# Patient Record
Sex: MALE | Race: WHITE | NOT HISPANIC OR LATINO | Employment: FULL TIME | URBAN - METROPOLITAN AREA
[De-identification: names, ages, dates, MRNs, and addresses within clinical notes are randomized per-mention and may not be internally consistent; named-entity substitution may affect disease eponyms.]

---

## 2017-01-26 ENCOUNTER — OFFICE VISIT (OUTPATIENT)
Dept: INTERNAL MEDICINE | Facility: CLINIC | Age: 47
End: 2017-01-26
Payer: COMMERCIAL

## 2017-01-26 VITALS
RESPIRATION RATE: 16 BRPM | OXYGEN SATURATION: 95 % | TEMPERATURE: 97 F | BODY MASS INDEX: 30.96 KG/M2 | HEART RATE: 101 BPM | DIASTOLIC BLOOD PRESSURE: 80 MMHG | HEIGHT: 70 IN | SYSTOLIC BLOOD PRESSURE: 130 MMHG | WEIGHT: 216.25 LBS

## 2017-01-26 DIAGNOSIS — F98.8 ADD (ATTENTION DEFICIT DISORDER): Primary | ICD-10-CM

## 2017-01-26 DIAGNOSIS — I10 ESSENTIAL HYPERTENSION: ICD-10-CM

## 2017-01-26 DIAGNOSIS — F31.78 BIPOLAR DISORDER, IN FULL REMISSION, MOST RECENT EPISODE MIXED: ICD-10-CM

## 2017-01-26 DIAGNOSIS — F10.11 ALCOHOL ABUSE, IN REMISSION: ICD-10-CM

## 2017-01-26 PROCEDURE — 99999 PR PBB SHADOW E&M-EST. PATIENT-LVL III: CPT | Mod: PBBFAC,,, | Performed by: FAMILY MEDICINE

## 2017-01-26 PROCEDURE — 1159F MED LIST DOCD IN RCRD: CPT | Mod: S$GLB,,, | Performed by: FAMILY MEDICINE

## 2017-01-26 PROCEDURE — 99214 OFFICE O/P EST MOD 30 MIN: CPT | Mod: S$GLB,,, | Performed by: FAMILY MEDICINE

## 2017-01-26 PROCEDURE — 3079F DIAST BP 80-89 MM HG: CPT | Mod: S$GLB,,, | Performed by: FAMILY MEDICINE

## 2017-01-26 PROCEDURE — 3075F SYST BP GE 130 - 139MM HG: CPT | Mod: S$GLB,,, | Performed by: FAMILY MEDICINE

## 2017-01-26 RX ORDER — DEXTROAMPHETAMINE SACCHARATE, AMPHETAMINE ASPARTATE, DEXTROAMPHETAMINE SULFATE AND AMPHETAMINE SULFATE 7.5; 7.5; 7.5; 7.5 MG/1; MG/1; MG/1; MG/1
1 TABLET ORAL 2 TIMES DAILY
Qty: 60 TABLET | Refills: 0 | Status: SHIPPED | OUTPATIENT
Start: 2017-02-27 | End: 2017-04-21 | Stop reason: SDUPTHER

## 2017-01-26 RX ORDER — DEXTROAMPHETAMINE SACCHARATE, AMPHETAMINE ASPARTATE, DEXTROAMPHETAMINE SULFATE AND AMPHETAMINE SULFATE 7.5; 7.5; 7.5; 7.5 MG/1; MG/1; MG/1; MG/1
1 TABLET ORAL 2 TIMES DAILY
Qty: 60 TABLET | Refills: 0 | Status: SHIPPED | OUTPATIENT
Start: 2017-01-27 | End: 2017-04-21 | Stop reason: SDUPTHER

## 2017-01-26 RX ORDER — ZOLPIDEM TARTRATE 5 MG/1
5 TABLET ORAL NIGHTLY PRN
Qty: 21 TABLET | Refills: 2 | Status: SHIPPED | OUTPATIENT
Start: 2017-01-26 | End: 2017-04-21 | Stop reason: SDUPTHER

## 2017-01-26 RX ORDER — LAMOTRIGINE 150 MG/1
150 TABLET ORAL DAILY
Qty: 30 TABLET | Refills: 6 | Status: SHIPPED | OUTPATIENT
Start: 2017-01-26 | End: 2017-04-21 | Stop reason: SDUPTHER

## 2017-01-26 RX ORDER — DEXTROAMPHETAMINE SACCHARATE, AMPHETAMINE ASPARTATE, DEXTROAMPHETAMINE SULFATE AND AMPHETAMINE SULFATE 7.5; 7.5; 7.5; 7.5 MG/1; MG/1; MG/1; MG/1
1 TABLET ORAL 2 TIMES DAILY
Qty: 60 TABLET | Refills: 0 | Status: SHIPPED | OUTPATIENT
Start: 2017-03-27 | End: 2017-04-21 | Stop reason: SDUPTHER

## 2017-01-26 NOTE — PROGRESS NOTES
Chief Complaint:    Chief Complaint   Patient presents with    Medication Refill       History of Present Illness:    Is here for follow-up of chronic medical problems,  Patient has ADHD, hypertension, bipolar disorder.  He is doing well with his medical conditions no side effects to the medication.  He is not drinking alcohol anymore.    ROS:  Review of Systems   Constitutional: Negative for activity change, chills, fatigue, fever and unexpected weight change.   HENT: Negative for congestion, ear discharge, ear pain, hearing loss, postnasal drip and rhinorrhea.    Eyes: Negative for pain and visual disturbance.   Respiratory: Negative for cough, chest tightness and shortness of breath.    Cardiovascular: Negative for chest pain and palpitations.   Gastrointestinal: Negative for abdominal pain, diarrhea and vomiting.   Endocrine: Negative for heat intolerance.   Genitourinary: Negative for dysuria, flank pain, frequency and hematuria.   Musculoskeletal: Negative for back pain, gait problem and neck pain.   Skin: Negative for color change and rash.   Neurological: Negative for dizziness, tremors, seizures, numbness and headaches.   Psychiatric/Behavioral: Negative for agitation, hallucinations, self-injury, sleep disturbance and suicidal ideas. The patient is not nervous/anxious.        History reviewed. No pertinent past medical history.    Social History:  Social History     Social History    Marital status:      Spouse name: N/A    Number of children: N/A    Years of education: N/A     Social History Main Topics    Smoking status: Former Smoker    Smokeless tobacco: Never Used    Alcohol use No    Drug use: No    Sexual activity: Yes     Partners: Female     Birth control/ protection: None, Surgical      Comment: Vasectomy     Other Topics Concern    None     Social History Narrative       Family History:   family history includes Diabetes in his father; Heart disease in his paternal  "grandmother; Hypertension in his father and mother.    Health Maintenance   Topic Date Due    TETANUS VACCINE  05/23/1986    Influenza Vaccine  08/01/2016    Lipid Panel  03/28/2021       Physical Exam:    Vital Signs  Temp: 97 °F (36.1 °C)  Temp src: Tympanic  Pulse: 101  Resp: 16  SpO2: 95 %  BP: 130/80  BP Location: Right arm  BP Method: Manual  Patient Position: Sitting  Height and Weight  Height: 5' 10" (177.8 cm)  Weight: 98.1 kg (216 lb 4.3 oz)  BSA (Calculated - sq m): 2.2 sq meters  BMI (Calculated): 31.1  Weight in (lb) to have BMI = 25: 173.9]    Body mass index is 31.03 kg/(m^2).    Physical Exam   Constitutional: He is oriented to person, place, and time. He appears well-developed.   HENT:   Mouth/Throat: Oropharynx is clear and moist.   Eyes: Conjunctivae are normal. Pupils are equal, round, and reactive to light.   Neck: Normal range of motion. Neck supple.   Cardiovascular: Normal rate, regular rhythm and normal heart sounds.    No murmur heard.  Pulmonary/Chest: Effort normal and breath sounds normal. No respiratory distress. He has no wheezes. He has no rales. He exhibits no tenderness.   Abdominal: Soft. He exhibits no distension and no mass. There is no tenderness. There is no guarding.   Musculoskeletal: He exhibits no edema or tenderness.   Lymphadenopathy:     He has no cervical adenopathy.   Neurological: He is alert and oriented to person, place, and time. He has normal reflexes.   Skin: Skin is warm and dry.   Psychiatric: He has a normal mood and affect. His behavior is normal. Judgment and thought content normal.   Vitals reviewed.      Lab Results   Component Value Date    CHOL 111 (L) 03/28/2016    CHOL 165 06/11/2014    TRIG 99 03/28/2016    TRIG 101 06/11/2014    HDL 40 03/28/2016    HDL 37 (L) 06/11/2014    TOTALCHOLEST 2.8 03/28/2016    TOTALCHOLEST 4.5 06/11/2014    NONHDLCHOL 71 03/28/2016    NONHDLCHOL 128 06/11/2014       Lab Results   Component Value Date    HGBA1C 5.5 " 03/28/2016       Assessment:      ICD-10-CM ICD-9-CM   1. ADD (attention deficit disorder) F98.8 314.00   2. Bipolar disorder, in full remission, most recent episode mixed F31.78 296.66   3. Essential hypertension I10 401.9   4. Alcohol abuse, in remission F10.10 305.03         Plan:  We'll continue current medication.  Patient's Adderall is been refilled.  Follow-up 3 months.    No orders of the defined types were placed in this encounter.      Current Outpatient Prescriptions   Medication Sig Dispense Refill    amlodipine (NORVASC) 5 MG tablet Take 1 tablet (5 mg total) by mouth once daily. 30 tablet 6    atorvastatin (LIPITOR) 40 MG tablet Take 1 tablet by mouth once daily.  6    [START ON 3/27/2017] dextroamphetamine-amphetamine 30 mg Tab Take 1 tablet by mouth 2 (two) times daily. 60 tablet 0    [START ON 2/27/2017] dextroamphetamine-amphetamine 30 mg Tab Take 1 tablet by mouth 2 (two) times daily. 60 tablet 0    [START ON 1/27/2017] dextroamphetamine-amphetamine 30 mg Tab Take 1 tablet by mouth 2 (two) times daily. 60 tablet 0    lamotrigine (LAMICTAL) 150 MG Tab Take 1 tablet (150 mg total) by mouth once daily. 30 tablet 6    lisinopril 10 MG tablet Take 1 tablet (10 mg total) by mouth once daily. 30 tablet 6    NASONEX 50 mcg/actuation nasal spray 2 sprays by Nasal route daily as needed.  5    zolpidem (AMBIEN) 5 MG Tab Take 1 tablet (5 mg total) by mouth nightly as needed. 21 tablet 2     No current facility-administered medications for this visit.        Medications Discontinued During This Encounter   Medication Reason    dextroamphetamine-amphetamine (AMPHETAMINE SALT COMBO) 30 mg Tab     lamotrigine (LAMICTAL) 150 MG Tab Reorder    dextroamphetamine-amphetamine 30 mg Tab Reorder    dextroamphetamine-amphetamine 30 mg Tab Reorder    dextroamphetamine-amphetamine 30 mg Tab Reorder    zolpidem (AMBIEN) 5 MG Tab Reorder       No Follow-up on file.      Dr Oscar Rodríguez MD    Disclaimer: This  note is prepared using voice recognition system and as such is likely to have errors and is not proof read.

## 2017-03-28 ENCOUNTER — TELEPHONE (OUTPATIENT)
Dept: INTERNAL MEDICINE | Facility: CLINIC | Age: 47
End: 2017-03-28

## 2017-03-28 NOTE — TELEPHONE ENCOUNTER
----- Message from Rosemarie Tenorio sent at 3/28/2017  8:27 AM CDT -----  Contact: Patient  Patient states that the pharmacy needs prior authorization on his Cecilia. Please call patient back if needed at [phone]. Thank you

## 2017-03-28 NOTE — TELEPHONE ENCOUNTER
----- Message from Lucie Franco sent at 3/28/2017  9:42 AM CDT -----  Contact: Patient  Patient returned call. He can be contacted at [phone].    Thanks,  Lucie

## 2017-03-28 NOTE — TELEPHONE ENCOUNTER
Attempted to call, no answer, message left to call back , need to know what meds he has tried in the past

## 2017-04-21 ENCOUNTER — OFFICE VISIT (OUTPATIENT)
Dept: INTERNAL MEDICINE | Facility: CLINIC | Age: 47
End: 2017-04-21
Payer: COMMERCIAL

## 2017-04-21 VITALS
WEIGHT: 212.5 LBS | HEART RATE: 92 BPM | TEMPERATURE: 97 F | SYSTOLIC BLOOD PRESSURE: 110 MMHG | DIASTOLIC BLOOD PRESSURE: 78 MMHG | BODY MASS INDEX: 30.42 KG/M2 | OXYGEN SATURATION: 96 % | HEIGHT: 70 IN

## 2017-04-21 DIAGNOSIS — I10 ESSENTIAL HYPERTENSION: Primary | ICD-10-CM

## 2017-04-21 DIAGNOSIS — F31.78 BIPOLAR DISORDER, IN FULL REMISSION, MOST RECENT EPISODE MIXED: ICD-10-CM

## 2017-04-21 DIAGNOSIS — F98.8 ADD (ATTENTION DEFICIT DISORDER): ICD-10-CM

## 2017-04-21 PROCEDURE — 99214 OFFICE O/P EST MOD 30 MIN: CPT | Mod: S$GLB,,, | Performed by: FAMILY MEDICINE

## 2017-04-21 PROCEDURE — 3074F SYST BP LT 130 MM HG: CPT | Mod: S$GLB,,, | Performed by: FAMILY MEDICINE

## 2017-04-21 PROCEDURE — 1160F RVW MEDS BY RX/DR IN RCRD: CPT | Mod: S$GLB,,, | Performed by: FAMILY MEDICINE

## 2017-04-21 PROCEDURE — 3078F DIAST BP <80 MM HG: CPT | Mod: S$GLB,,, | Performed by: FAMILY MEDICINE

## 2017-04-21 PROCEDURE — 99999 PR PBB SHADOW E&M-EST. PATIENT-LVL III: CPT | Mod: PBBFAC,,, | Performed by: FAMILY MEDICINE

## 2017-04-21 RX ORDER — ZOLPIDEM TARTRATE 5 MG/1
5 TABLET ORAL NIGHTLY PRN
Qty: 21 TABLET | Refills: 2 | Status: SHIPPED | OUTPATIENT
Start: 2017-04-27 | End: 2017-07-17 | Stop reason: SDUPTHER

## 2017-04-21 RX ORDER — DEXTROAMPHETAMINE SACCHARATE, AMPHETAMINE ASPARTATE, DEXTROAMPHETAMINE SULFATE AND AMPHETAMINE SULFATE 7.5; 7.5; 7.5; 7.5 MG/1; MG/1; MG/1; MG/1
1 TABLET ORAL 2 TIMES DAILY
Qty: 60 TABLET | Refills: 0 | Status: SHIPPED | OUTPATIENT
Start: 2017-06-27 | End: 2017-07-17 | Stop reason: SDUPTHER

## 2017-04-21 RX ORDER — AMLODIPINE BESYLATE 5 MG/1
5 TABLET ORAL DAILY
Qty: 30 TABLET | Refills: 6 | Status: SHIPPED | OUTPATIENT
Start: 2017-05-27 | End: 2017-07-17 | Stop reason: SDUPTHER

## 2017-04-21 RX ORDER — LISINOPRIL 10 MG/1
10 TABLET ORAL DAILY
Qty: 30 TABLET | Refills: 6 | Status: SHIPPED | OUTPATIENT
Start: 2017-04-21 | End: 2017-07-17 | Stop reason: SDUPTHER

## 2017-04-21 RX ORDER — LAMOTRIGINE 150 MG/1
150 TABLET ORAL DAILY
Qty: 30 TABLET | Refills: 6 | Status: SHIPPED | OUTPATIENT
Start: 2017-04-21 | End: 2017-07-17 | Stop reason: SDUPTHER

## 2017-04-21 RX ORDER — DEXTROAMPHETAMINE SACCHARATE, AMPHETAMINE ASPARTATE, DEXTROAMPHETAMINE SULFATE AND AMPHETAMINE SULFATE 7.5; 7.5; 7.5; 7.5 MG/1; MG/1; MG/1; MG/1
1 TABLET ORAL 2 TIMES DAILY
Qty: 60 TABLET | Refills: 0 | Status: SHIPPED | OUTPATIENT
Start: 2017-04-27 | End: 2017-07-17 | Stop reason: SDUPTHER

## 2017-04-21 RX ORDER — DEXTROAMPHETAMINE SACCHARATE, AMPHETAMINE ASPARTATE, DEXTROAMPHETAMINE SULFATE AND AMPHETAMINE SULFATE 7.5; 7.5; 7.5; 7.5 MG/1; MG/1; MG/1; MG/1
1 TABLET ORAL 2 TIMES DAILY
Qty: 60 TABLET | Refills: 0 | Status: SHIPPED | OUTPATIENT
Start: 2017-05-27 | End: 2017-07-17 | Stop reason: SDUPTHER

## 2017-04-21 NOTE — PROGRESS NOTES
Chief Complaint:    Chief Complaint   Patient presents with    Annual Exam    Medication Refill       History of Present Illness:    Patient is a for follow-up of chronic medical problems,  He has hypertension which is well-controlled, ADHD, and bipolar disorder.  He is not had any flare up of his bipolar ADD meds are working well.  Also has chronic insomnia which is controlled with Ambien.  Follows with urologist for testosterone replacement therapy.    ROS:  Review of Systems   Constitutional: Negative for activity change, chills, fatigue, fever and unexpected weight change.   HENT: Negative for congestion, ear discharge, ear pain, hearing loss, postnasal drip and rhinorrhea.    Eyes: Negative for pain and visual disturbance.   Respiratory: Negative for cough, chest tightness and shortness of breath.    Cardiovascular: Negative for chest pain and palpitations.   Gastrointestinal: Negative for abdominal pain, diarrhea and vomiting.   Endocrine: Negative for heat intolerance.   Genitourinary: Negative for dysuria, flank pain, frequency and hematuria.   Musculoskeletal: Negative for back pain, gait problem and neck pain.   Skin: Negative for color change and rash.   Neurological: Negative for dizziness, tremors, seizures, numbness and headaches.   Psychiatric/Behavioral: Negative for agitation, hallucinations, self-injury, sleep disturbance and suicidal ideas. The patient is not nervous/anxious.        History reviewed. No pertinent past medical history.    Social History:  Social History     Social History    Marital status:      Spouse name: N/A    Number of children: N/A    Years of education: N/A     Social History Main Topics    Smoking status: Former Smoker    Smokeless tobacco: Never Used    Alcohol use No    Drug use: No    Sexual activity: Yes     Partners: Female     Birth control/ protection: None, Surgical      Comment: Vasectomy     Other Topics Concern    None     Social History  "Narrative    None       Family History:   family history includes Diabetes in his father; Heart disease in his paternal grandmother; Hypertension in his father and mother.    Health Maintenance   Topic Date Due    TETANUS VACCINE  05/23/1986    Influenza Vaccine  08/01/2016    Lipid Panel  03/28/2021       Physical Exam:    Vital Signs  Temp: 97.1 °F (36.2 °C)  Temp src: Tympanic  Pulse: 92  SpO2: 96 %  BP: 110/78  BP Location: Right arm  BP Method: Manual  Patient Position: Sitting  Pain Score: 0-No pain  Height and Weight  Height: 5' 10" (177.8 cm)  Weight: 96.4 kg (212 lb 8.4 oz)  BSA (Calculated - sq m): 2.18 sq meters  BMI (Calculated): 30.6  Weight in (lb) to have BMI = 25: 173.9]    Body mass index is 30.49 kg/(m^2).    Physical Exam   Constitutional: He is oriented to person, place, and time. He appears well-developed.   HENT:   Mouth/Throat: Oropharynx is clear and moist.   Eyes: Conjunctivae are normal. Pupils are equal, round, and reactive to light.   Neck: Normal range of motion. Neck supple.   Cardiovascular: Normal rate, regular rhythm and normal heart sounds.    No murmur heard.  Pulmonary/Chest: Effort normal and breath sounds normal. No respiratory distress. He has no wheezes. He has no rales. He exhibits no tenderness.   Abdominal: Soft. He exhibits no distension and no mass. There is no tenderness. There is no guarding.   Musculoskeletal: He exhibits no edema or tenderness.   Lymphadenopathy:     He has no cervical adenopathy.   Neurological: He is alert and oriented to person, place, and time. He has normal reflexes.   Skin: Skin is warm and dry.   Psychiatric: He has a normal mood and affect. His behavior is normal. Judgment and thought content normal.       Lab Results   Component Value Date    CHOL 111 (L) 03/28/2016    CHOL 165 06/11/2014    TRIG 99 03/28/2016    TRIG 101 06/11/2014    HDL 40 03/28/2016    HDL 37 (L) 06/11/2014    TOTALCHOLEST 2.8 03/28/2016    TOTALCHOLEST 4.5 06/11/2014 "    NONHDLCHOL 71 03/28/2016    NONHDLCHOL 128 06/11/2014       Lab Results   Component Value Date    HGBA1C 5.5 03/28/2016       Assessment:      ICD-10-CM ICD-9-CM   1. Essential hypertension I10 401.9   2. Bipolar disorder, in full remission, most recent episode mixed F31.78 296.66   3. ADD (attention deficit disorder) F98.8 314.00         Plan:  Patient with the above mentioned medical problem doing well  was worked up, 3 prescriptions for Adderall sent to the pharmacy.  Also prescription for Ambien sent.  Follow-up 3 months.  No orders of the defined types were placed in this encounter.      Current Outpatient Prescriptions   Medication Sig Dispense Refill    [START ON 5/27/2017] amlodipine (NORVASC) 5 MG tablet Take 1 tablet (5 mg total) by mouth once daily. 30 tablet 6    atorvastatin (LIPITOR) 40 MG tablet Take 1 tablet by mouth once daily.  6    [START ON 4/27/2017] dextroamphetamine-amphetamine 30 mg Tab Take 1 tablet by mouth 2 (two) times daily. 60 tablet 0    [START ON 5/27/2017] dextroamphetamine-amphetamine 30 mg Tab Take 1 tablet by mouth 2 (two) times daily. 60 tablet 0    [START ON 6/27/2017] dextroamphetamine-amphetamine 30 mg Tab Take 1 tablet by mouth 2 (two) times daily. 60 tablet 0    lamotrigine (LAMICTAL) 150 MG Tab Take 1 tablet (150 mg total) by mouth once daily. 30 tablet 6    lisinopril 10 MG tablet Take 1 tablet (10 mg total) by mouth once daily. 30 tablet 6    NASONEX 50 mcg/actuation nasal spray 2 sprays by Nasal route daily as needed.  5    [START ON 4/27/2017] zolpidem (AMBIEN) 5 MG Tab Take 1 tablet (5 mg total) by mouth nightly as needed. 21 tablet 2     No current facility-administered medications for this visit.        Medications Discontinued During This Encounter   Medication Reason    dextroamphetamine-amphetamine 30 mg Tab Reorder    dextroamphetamine-amphetamine 30 mg Tab Reorder    dextroamphetamine-amphetamine 30 mg Tab Reorder    amlodipine (NORVASC) 5 MG  tablet Reorder    lisinopril 10 MG tablet Reorder    lamotrigine (LAMICTAL) 150 MG Tab Reorder    zolpidem (AMBIEN) 5 MG Tab Reorder       No Follow-up on file.      Dr Oscar Rodríguez MD    Disclaimer: This note is prepared using voice recognition system and as such is likely to have errors and is not proof read.

## 2017-07-14 ENCOUNTER — TELEPHONE (OUTPATIENT)
Dept: FAMILY MEDICINE | Facility: CLINIC | Age: 47
End: 2017-07-14

## 2017-07-14 NOTE — TELEPHONE ENCOUNTER
----- Message from Royal Hester sent at 7/14/2017 12:56 PM CDT -----  Contact: [phone]  Pt would like to consult with nurse about med refills. Please call back at [phone]. thx

## 2017-07-17 ENCOUNTER — OFFICE VISIT (OUTPATIENT)
Dept: FAMILY MEDICINE | Facility: CLINIC | Age: 47
End: 2017-07-17
Payer: COMMERCIAL

## 2017-07-17 VITALS
BODY MASS INDEX: 30.27 KG/M2 | SYSTOLIC BLOOD PRESSURE: 132 MMHG | HEIGHT: 70 IN | TEMPERATURE: 98 F | DIASTOLIC BLOOD PRESSURE: 90 MMHG | WEIGHT: 211.44 LBS | HEART RATE: 113 BPM | OXYGEN SATURATION: 96 %

## 2017-07-17 DIAGNOSIS — E78.5 HYPERLIPIDEMIA, UNSPECIFIED HYPERLIPIDEMIA TYPE: ICD-10-CM

## 2017-07-17 DIAGNOSIS — I10 ESSENTIAL HYPERTENSION: ICD-10-CM

## 2017-07-17 DIAGNOSIS — F98.8 ATTENTION DEFICIT DISORDER, UNSPECIFIED HYPERACTIVITY PRESENCE: Primary | ICD-10-CM

## 2017-07-17 DIAGNOSIS — F51.04 PSYCHOPHYSIOLOGICAL INSOMNIA: ICD-10-CM

## 2017-07-17 DIAGNOSIS — F31.78 BIPOLAR DISORDER, IN FULL REMISSION, MOST RECENT EPISODE MIXED: ICD-10-CM

## 2017-07-17 PROCEDURE — 99214 OFFICE O/P EST MOD 30 MIN: CPT | Mod: S$GLB,,,

## 2017-07-17 PROCEDURE — 99999 PR PBB SHADOW E&M-EST. PATIENT-LVL III: CPT | Mod: PBBFAC,,,

## 2017-07-17 RX ORDER — CEFDINIR 300 MG/1
CAPSULE ORAL
Refills: 0 | COMMUNITY
Start: 2017-07-12 | End: 2017-10-23

## 2017-07-17 RX ORDER — DEXTROAMPHETAMINE SACCHARATE, AMPHETAMINE ASPARTATE, DEXTROAMPHETAMINE SULFATE AND AMPHETAMINE SULFATE 7.5; 7.5; 7.5; 7.5 MG/1; MG/1; MG/1; MG/1
1 TABLET ORAL 2 TIMES DAILY
Qty: 60 TABLET | Refills: 0 | Status: SHIPPED | OUTPATIENT
Start: 2017-08-27 | End: 2017-09-26

## 2017-07-17 RX ORDER — ATORVASTATIN CALCIUM 40 MG/1
40 TABLET, FILM COATED ORAL DAILY
Qty: 90 TABLET | Refills: 3 | Status: SHIPPED | OUTPATIENT
Start: 2017-07-17 | End: 2017-10-23

## 2017-07-17 RX ORDER — MELOXICAM 7.5 MG/1
TABLET ORAL
Refills: 3 | COMMUNITY
Start: 2017-05-21 | End: 2017-12-18

## 2017-07-17 RX ORDER — OXYCODONE AND ACETAMINOPHEN 10; 325 MG/1; MG/1
TABLET ORAL
Refills: 0 | COMMUNITY
Start: 2017-05-26 | End: 2017-07-17

## 2017-07-17 RX ORDER — DEXTROAMPHETAMINE SACCHARATE, AMPHETAMINE ASPARTATE, DEXTROAMPHETAMINE SULFATE AND AMPHETAMINE SULFATE 7.5; 7.5; 7.5; 7.5 MG/1; MG/1; MG/1; MG/1
1 TABLET ORAL 2 TIMES DAILY
Qty: 60 TABLET | Refills: 0 | Status: SHIPPED | OUTPATIENT
Start: 2017-09-27 | End: 2017-10-23 | Stop reason: SDUPTHER

## 2017-07-17 RX ORDER — CEFUROXIME AXETIL 250 MG/1
TABLET ORAL
Refills: 0 | COMMUNITY
Start: 2017-06-27 | End: 2017-07-17

## 2017-07-17 RX ORDER — DEXTROAMPHETAMINE SACCHARATE, AMPHETAMINE ASPARTATE, DEXTROAMPHETAMINE SULFATE AND AMPHETAMINE SULFATE 7.5; 7.5; 7.5; 7.5 MG/1; MG/1; MG/1; MG/1
1 TABLET ORAL 2 TIMES DAILY
Qty: 60 TABLET | Refills: 0 | Status: SHIPPED | OUTPATIENT
Start: 2017-07-27 | End: 2017-08-26

## 2017-07-17 RX ORDER — LAMOTRIGINE 150 MG/1
150 TABLET ORAL DAILY
Qty: 90 TABLET | Refills: 3 | Status: SHIPPED | OUTPATIENT
Start: 2017-07-17 | End: 2017-10-23 | Stop reason: SDUPTHER

## 2017-07-17 RX ORDER — ZOLPIDEM TARTRATE 5 MG/1
5 TABLET ORAL NIGHTLY PRN
Qty: 21 TABLET | Refills: 5 | Status: SHIPPED | OUTPATIENT
Start: 2017-07-17 | End: 2017-12-18 | Stop reason: SDUPTHER

## 2017-07-17 RX ORDER — AMLODIPINE BESYLATE 5 MG/1
5 TABLET ORAL DAILY
Qty: 90 TABLET | Refills: 3 | Status: SHIPPED | OUTPATIENT
Start: 2017-07-17 | End: 2017-10-23 | Stop reason: SDUPTHER

## 2017-07-17 RX ORDER — LISINOPRIL 10 MG/1
10 TABLET ORAL DAILY
Qty: 90 TABLET | Refills: 3 | Status: SHIPPED | OUTPATIENT
Start: 2017-07-17 | End: 2017-10-23 | Stop reason: SDUPTHER

## 2017-07-17 NOTE — PROGRESS NOTES
Subjective:       Patient ID: Andrei Casanova is a 47 y.o. male.    Chief Complaint: Medication Refill    HPI   The patient presents today for refill of his  Adderall whichhe  takes for ADD. He says that medication  controls their symptoms well. He denies any side effects from the medication; no chest pain, no restlessness, no palpitations, no unexpected weight loss. His weight has not significantly changed since last visit.     The patient voices a history of hypertension for which he currently takes isinopril and amlodipine. he is tolerating the medication well and is in good compliance.  The patient is experiencing no side effects.  Counseling was offered regarding low salt diets. he reports  a reduced salt intake. he  denies chest pain, palpitations, shortness of breath, dyspnea on exertion, neck pain, nausea, vomiting, diaphoresis, paroxysmal nocturnal dyspnea, or orthopnea.     The patient presents with a history hyperlipidemia for which he currently takes lipitor. The patient reports tolerating the medication well and is in good compliance. he denies medication side effects.  The patient denies chest pain, neuropathy, and myalgias.  The patient denies reduced fat intake. he  reports exercising.    Lab Results   Component Value Date    CHOL 111 (L) 03/28/2016    CHOL 165 06/11/2014     Lab Results   Component Value Date    HDL 40 03/28/2016    HDL 37 (L) 06/11/2014     Lab Results   Component Value Date    LDLCALC 51.2 (L) 03/28/2016    LDLCALC 107.8 06/11/2014     Lab Results   Component Value Date    TRIG 99 03/28/2016    TRIG 101 06/11/2014     Lab Results   Component Value Date    CHOLHDL 36.0 03/28/2016    CHOLHDL 22.4 06/11/2014     So voices a history of bipolar disorder for which he currently takes Lamictal.  He says it works well to keep his mood stabilized.  He denies any side effects from the medication.    Patient also voices a history of insomnia for which she currently takes Ambien.  He says the  Ambien works well to keep symptoms stable well controlled he says he does not have to take it very frequently.  He voices only taking the medication when he actually cannot go to sleep.  He says with Ambien he gets a good night sleep he denies any hyper somnolence the next day or any parasomnias.   .        Review of Systems   Constitutional: Negative for activity change, appetite change, fatigue and unexpected weight change.   HENT: Negative.    Eyes: Negative.    Respiratory: Negative for cough, chest tightness, shortness of breath and wheezing.    Cardiovascular: Negative for chest pain, palpitations and leg swelling.   Gastrointestinal: Negative for constipation, diarrhea, nausea and vomiting.   Endocrine: Negative.    Genitourinary: Negative.    Musculoskeletal: Negative.    Skin: Negative for color change.   Allergic/Immunologic: Negative.    Neurological: Negative for dizziness, weakness and light-headedness.   Hematological: Negative.    Psychiatric/Behavioral: Negative for sleep disturbance.         Objective:      Physical Exam   Constitutional: He is oriented to person, place, and time. Vital signs are normal. He appears well-developed and well-nourished. He is active and cooperative. No distress.   HENT:   Head: Normocephalic and atraumatic.   Eyes: Conjunctivae are normal. Pupils are equal, round, and reactive to light. No scleral icterus.   Neck: Normal range of motion. Neck supple.   Cardiovascular: Normal rate, regular rhythm, normal heart sounds and intact distal pulses.  Exam reveals no gallop and no friction rub.    No murmur heard.  Pulmonary/Chest: Effort normal and breath sounds normal. No respiratory distress. He has no wheezes. He has no rales. He exhibits no tenderness.   Musculoskeletal: Normal range of motion. He exhibits no edema or tenderness.   Neurological: He is alert and oriented to person, place, and time. He exhibits normal muscle tone. Coordination normal.   Skin: Skin is warm and  dry. No rash noted. He is not diaphoretic. No erythema. No pallor.   Psychiatric: He has a normal mood and affect. His speech is normal and behavior is normal. Judgment and thought content normal.       Assessment:       1. Attention deficit disorder, unspecified hyperactivity presence    2. Essential hypertension    3. Hyperlipidemia, unspecified hyperlipidemia type    4. Bipolar disorder, in full remission, most recent episode mixed    5. Psychophysiological insomnia          Plan:   Attention deficit disorder, unspecified hyperactivity presence  -     dextroamphetamine-amphetamine 30 mg Tab; Take 1 tablet by mouth 2 (two) times daily.  Dispense: 60 tablet; Refill: 0  -     dextroamphetamine-amphetamine 30 mg Tab; Take 1 tablet by mouth 2 (two) times daily.  Dispense: 60 tablet; Refill: 0  -     dextroamphetamine-amphetamine 30 mg Tab; Take 1 tablet by mouth 2 (two) times daily.  Dispense: 60 tablet; Refill: 0    Essential hypertension  -     lisinopril 10 MG tablet; Take 1 tablet (10 mg total) by mouth once daily.  Dispense: 90 tablet; Refill: 3  -     amlodipine (NORVASC) 5 MG tablet; Take 1 tablet (5 mg total) by mouth once daily.  Dispense: 90 tablet; Refill: 3    Hyperlipidemia, unspecified hyperlipidemia type  -     atorvastatin (LIPITOR) 40 MG tablet; Take 1 tablet (40 mg total) by mouth once daily.  Dispense: 90 tablet; Refill: 3    Bipolar disorder, in full remission, most recent episode mixed  -     lamotrigine (LAMICTAL) 150 MG Tab; Take 1 tablet (150 mg total) by mouth once daily.  Dispense: 90 tablet; Refill: 3    Psychophysiological insomnia  -     zolpidem (AMBIEN) 5 MG Tab; Take 1 tablet (5 mg total) by mouth nightly as needed.  Dispense: 21 tablet; Refill: 5            Disclaimer: This note is prepared using voice recognition software.  As such there may be errors in the dictation.  It has not been proofread.

## 2017-10-23 ENCOUNTER — OFFICE VISIT (OUTPATIENT)
Dept: FAMILY MEDICINE | Facility: CLINIC | Age: 47
End: 2017-10-23
Payer: COMMERCIAL

## 2017-10-23 VITALS
HEIGHT: 70 IN | OXYGEN SATURATION: 98 % | DIASTOLIC BLOOD PRESSURE: 81 MMHG | BODY MASS INDEX: 29.07 KG/M2 | TEMPERATURE: 98 F | SYSTOLIC BLOOD PRESSURE: 123 MMHG | HEART RATE: 70 BPM | WEIGHT: 203.06 LBS

## 2017-10-23 DIAGNOSIS — F51.04 PSYCHOPHYSIOLOGICAL INSOMNIA: ICD-10-CM

## 2017-10-23 DIAGNOSIS — I10 ESSENTIAL HYPERTENSION: ICD-10-CM

## 2017-10-23 DIAGNOSIS — F32.A DEPRESSION, UNSPECIFIED DEPRESSION TYPE: ICD-10-CM

## 2017-10-23 DIAGNOSIS — F31.78 BIPOLAR DISORDER, IN FULL REMISSION, MOST RECENT EPISODE MIXED: ICD-10-CM

## 2017-10-23 DIAGNOSIS — E78.5 HYPERLIPIDEMIA, UNSPECIFIED HYPERLIPIDEMIA TYPE: Primary | ICD-10-CM

## 2017-10-23 DIAGNOSIS — F98.8 ATTENTION DEFICIT DISORDER, UNSPECIFIED HYPERACTIVITY PRESENCE: ICD-10-CM

## 2017-10-23 PROCEDURE — 99214 OFFICE O/P EST MOD 30 MIN: CPT | Mod: S$GLB,,, | Performed by: FAMILY MEDICINE

## 2017-10-23 PROCEDURE — 99999 PR PBB SHADOW E&M-EST. PATIENT-LVL III: CPT | Mod: PBBFAC,,, | Performed by: FAMILY MEDICINE

## 2017-10-23 RX ORDER — ZOLPIDEM TARTRATE 5 MG/1
5 TABLET ORAL NIGHTLY PRN
Qty: 21 TABLET | Refills: 5 | Status: CANCELLED | OUTPATIENT
Start: 2017-10-23 | End: 2018-04-23

## 2017-10-23 RX ORDER — DEXTROAMPHETAMINE SACCHARATE, AMPHETAMINE ASPARTATE, DEXTROAMPHETAMINE SULFATE AND AMPHETAMINE SULFATE 7.5; 7.5; 7.5; 7.5 MG/1; MG/1; MG/1; MG/1
1 TABLET ORAL 2 TIMES DAILY
Qty: 60 TABLET | Refills: 0 | Status: SHIPPED | OUTPATIENT
Start: 2017-12-27 | End: 2017-12-18 | Stop reason: SDUPTHER

## 2017-10-23 RX ORDER — LISINOPRIL 10 MG/1
10 TABLET ORAL DAILY
Qty: 90 TABLET | Refills: 3 | Status: SHIPPED | OUTPATIENT
Start: 2017-10-23 | End: 2017-12-18 | Stop reason: SDUPTHER

## 2017-10-23 RX ORDER — LAMOTRIGINE 200 MG/1
200 TABLET ORAL DAILY
Qty: 30 TABLET | Refills: 3 | Status: SHIPPED | OUTPATIENT
Start: 2017-10-23 | End: 2017-12-18 | Stop reason: SDUPTHER

## 2017-10-23 RX ORDER — DEXTROAMPHETAMINE SACCHARATE, AMPHETAMINE ASPARTATE, DEXTROAMPHETAMINE SULFATE AND AMPHETAMINE SULFATE 7.5; 7.5; 7.5; 7.5 MG/1; MG/1; MG/1; MG/1
1 TABLET ORAL 2 TIMES DAILY
Qty: 60 TABLET | Refills: 0 | Status: SHIPPED | OUTPATIENT
Start: 2017-10-27 | End: 2017-11-26

## 2017-10-23 RX ORDER — AMLODIPINE BESYLATE 5 MG/1
5 TABLET ORAL DAILY
Qty: 90 TABLET | Refills: 3 | Status: SHIPPED | OUTPATIENT
Start: 2017-10-23 | End: 2017-12-18 | Stop reason: SDUPTHER

## 2017-10-23 RX ORDER — LAMOTRIGINE 150 MG/1
150 TABLET ORAL DAILY
Qty: 90 TABLET | Refills: 3 | Status: CANCELLED | OUTPATIENT
Start: 2017-10-23

## 2017-10-23 RX ORDER — DEXTROAMPHETAMINE SACCHARATE, AMPHETAMINE ASPARTATE, DEXTROAMPHETAMINE SULFATE AND AMPHETAMINE SULFATE 7.5; 7.5; 7.5; 7.5 MG/1; MG/1; MG/1; MG/1
1 TABLET ORAL 2 TIMES DAILY
Qty: 60 TABLET | Refills: 0 | Status: SHIPPED | OUTPATIENT
Start: 2017-11-27 | End: 2017-12-18 | Stop reason: SDUPTHER

## 2017-10-23 NOTE — PROGRESS NOTES
Chief Complaint:    Chief Complaint   Patient presents with    Medication Refill       History of Present Illness:  Presents today for follow-up of chronic medical problems,  He says he's been depressed more lately he's had some change in his job requirements is putting some stress.  Her niece was seeing the psychiatrist and he had increased the Lamictal to 200 mg which did good but then patient os him to decrease the medicine 250 and now I would like to go back to 200 mg of Lamictal.  He has ADHD which is working good.  Also has chronic insomnia stable on Ambien  Bipolar disorder also stable  Patient is not suicidal or homicidal.      ROS:  Review of Systems   Constitutional: Negative for activity change, chills, fatigue, fever and unexpected weight change.   HENT: Negative for congestion, ear discharge, ear pain, hearing loss, postnasal drip and rhinorrhea.    Eyes: Negative for pain and visual disturbance.   Respiratory: Negative for cough, chest tightness and shortness of breath.    Cardiovascular: Negative for chest pain and palpitations.   Gastrointestinal: Negative for abdominal pain, diarrhea and vomiting.   Endocrine: Negative for heat intolerance.   Genitourinary: Negative for dysuria, flank pain, frequency and hematuria.   Musculoskeletal: Negative for back pain, gait problem and neck pain.   Skin: Negative for color change and rash.   Neurological: Negative for dizziness, tremors, seizures, numbness and headaches.   Psychiatric/Behavioral: Negative for agitation, hallucinations, self-injury, sleep disturbance and suicidal ideas. The patient is not nervous/anxious.        Past Medical History:   Diagnosis Date    ADHD (attention deficit hyperactivity disorder)     Bipolar disorder        Social History:  Social History     Social History    Marital status:      Spouse name: N/A    Number of children: N/A    Years of education: N/A     Social History Main Topics    Smoking status: Former  "Smoker    Smokeless tobacco: Never Used    Alcohol use No    Drug use: No    Sexual activity: Yes     Partners: Female     Birth control/ protection: None, Surgical      Comment: Vasectomy     Other Topics Concern    None     Social History Narrative    None       Family History:   family history includes Diabetes in his father; Heart disease in his paternal grandmother; Hypertension in his father and mother.    Health Maintenance   Topic Date Due    Lipid Panel  03/28/2017       Physical Exam:    Vital Signs  Temp: 98.2 °F (36.8 °C)  Temp src: Tympanic  Pulse: 70  SpO2: 98 %  BP: 123/81  BP Location: Left arm  Patient Position: Sitting  Pain Score: 0-No pain  Height and Weight  Height: 5' 10" (177.8 cm)  Weight: 92.1 kg (203 lb 0.7 oz)  BSA (Calculated - sq m): 2.13 sq meters  BMI (Calculated): 29.2  Weight in (lb) to have BMI = 25: 173.9]    Body mass index is 29.13 kg/m².    Physical Exam   Constitutional: He is oriented to person, place, and time. He appears well-developed.   HENT:   Mouth/Throat: Oropharynx is clear and moist.   Eyes: Conjunctivae are normal. Pupils are equal, round, and reactive to light.   Neck: Normal range of motion. Neck supple.   Cardiovascular: Normal rate, regular rhythm and normal heart sounds.    No murmur heard.  Pulmonary/Chest: Effort normal and breath sounds normal. No respiratory distress. He has no wheezes. He has no rales. He exhibits no tenderness.   Abdominal: Soft. He exhibits no distension and no mass. There is no tenderness. There is no guarding.   Musculoskeletal: He exhibits no edema or tenderness.   Lymphadenopathy:     He has no cervical adenopathy.   Neurological: He is alert and oriented to person, place, and time. He has normal reflexes.   Skin: Skin is warm and dry.   Psychiatric: He has a normal mood and affect. His behavior is normal. Judgment and thought content normal.       Lab Results   Component Value Date    CHOL 111 (L) 03/28/2016    CHOL 165 " 06/11/2014    TRIG 99 03/28/2016    TRIG 101 06/11/2014    HDL 40 03/28/2016    HDL 37 (L) 06/11/2014    TOTALCHOLEST 2.8 03/28/2016    TOTALCHOLEST 4.5 06/11/2014    NONHDLCHOL 71 03/28/2016    NONHDLCHOL 128 06/11/2014       Lab Results   Component Value Date    HGBA1C 5.5 03/28/2016       Assessment:      ICD-10-CM ICD-9-CM   1. Hyperlipidemia, unspecified hyperlipidemia type E78.5 272.4   2. Essential hypertension I10 401.9   3. Bipolar disorder, in full remission, most recent episode mixed F31.78 296.66   4. Psychophysiological insomnia F51.04 307.42   5. Attention deficit disorder, unspecified hyperactivity presence F98.8 314.00   6. Depression, unspecified depression type F32.9 311         Plan:  Lamictal of increased 200 mg side effects discussed watch for rash should that happen please go to the ED.  He has raised his medicine from 150-200 mg in the past without any issues.  Patient's Adderall was refilled today  checked.  Other medical problems stable continue current plan.    Orders Placed This Encounter   Procedures    Lipid panel       Current Outpatient Prescriptions   Medication Sig Dispense Refill    amlodipine (NORVASC) 5 MG tablet Take 1 tablet (5 mg total) by mouth once daily. 90 tablet 3    [START ON 10/27/2017] dextroamphetamine-amphetamine 30 mg Tab Take 1 tablet by mouth 2 (two) times daily. 60 tablet 0    [START ON 11/27/2017] dextroamphetamine-amphetamine 30 mg Tab Take 1 tablet by mouth 2 (two) times daily. 60 tablet 0    [START ON 12/27/2017] dextroamphetamine-amphetamine 30 mg Tab Take 1 tablet by mouth 2 (two) times daily. 60 tablet 0    lamotrigine (LAMICTAL) 200 MG tablet Take 1 tablet (200 mg total) by mouth once daily. 30 tablet 3    lisinopril 10 MG tablet Take 1 tablet (10 mg total) by mouth once daily. 90 tablet 3    meloxicam (MOBIC) 7.5 MG tablet TK 1 T PO QAM P  3    NASONEX 50 mcg/actuation nasal spray 2 sprays by Nasal route daily as needed.  5    zolpidem  (AMBIEN) 5 MG Tab Take 1 tablet (5 mg total) by mouth nightly as needed. 21 tablet 5     No current facility-administered medications for this visit.        Medications Discontinued During This Encounter   Medication Reason    atorvastatin (LIPITOR) 40 MG tablet Patient no longer taking    cefdinir (OMNICEF) 300 MG capsule Patient no longer taking    amlodipine (NORVASC) 5 MG tablet Reorder    lisinopril 10 MG tablet Reorder    dextroamphetamine-amphetamine 30 mg Tab Reorder    lamotrigine (LAMICTAL) 150 MG Tab Reorder       No Follow-up on file.      Oscar Rodríguez MD          Disclaimer: This note is prepared using voice recognition system and as such is likely to have errors and is not proof read.

## 2017-12-18 ENCOUNTER — OFFICE VISIT (OUTPATIENT)
Dept: FAMILY MEDICINE | Facility: CLINIC | Age: 47
End: 2017-12-18
Payer: COMMERCIAL

## 2017-12-18 VITALS
HEIGHT: 70 IN | HEART RATE: 105 BPM | SYSTOLIC BLOOD PRESSURE: 112 MMHG | TEMPERATURE: 97 F | DIASTOLIC BLOOD PRESSURE: 78 MMHG | BODY MASS INDEX: 29.01 KG/M2 | WEIGHT: 202.63 LBS | OXYGEN SATURATION: 98 %

## 2017-12-18 DIAGNOSIS — F98.8 ATTENTION DEFICIT DISORDER, UNSPECIFIED HYPERACTIVITY PRESENCE: Primary | ICD-10-CM

## 2017-12-18 DIAGNOSIS — F51.04 PSYCHOPHYSIOLOGICAL INSOMNIA: ICD-10-CM

## 2017-12-18 DIAGNOSIS — F31.78 BIPOLAR DISORDER, IN FULL REMISSION, MOST RECENT EPISODE MIXED: ICD-10-CM

## 2017-12-18 PROCEDURE — 99999 PR PBB SHADOW E&M-EST. PATIENT-LVL III: CPT | Mod: PBBFAC,,, | Performed by: FAMILY MEDICINE

## 2017-12-18 PROCEDURE — 99214 OFFICE O/P EST MOD 30 MIN: CPT | Mod: S$GLB,,, | Performed by: FAMILY MEDICINE

## 2017-12-18 RX ORDER — DEXTROAMPHETAMINE SACCHARATE, AMPHETAMINE ASPARTATE, DEXTROAMPHETAMINE SULFATE AND AMPHETAMINE SULFATE 7.5; 7.5; 7.5; 7.5 MG/1; MG/1; MG/1; MG/1
1 TABLET ORAL 2 TIMES DAILY
Qty: 60 TABLET | Refills: 0 | Status: SHIPPED | OUTPATIENT
Start: 2017-12-27 | End: 2018-01-26

## 2017-12-18 RX ORDER — LISINOPRIL 10 MG/1
10 TABLET ORAL DAILY
Qty: 90 TABLET | Refills: 3 | Status: SHIPPED | OUTPATIENT
Start: 2017-12-18 | End: 2018-09-24 | Stop reason: SDUPTHER

## 2017-12-18 RX ORDER — DEXTROAMPHETAMINE SACCHARATE, AMPHETAMINE ASPARTATE, DEXTROAMPHETAMINE SULFATE AND AMPHETAMINE SULFATE 7.5; 7.5; 7.5; 7.5 MG/1; MG/1; MG/1; MG/1
1 TABLET ORAL 2 TIMES DAILY
Qty: 60 TABLET | Refills: 0 | Status: SHIPPED | OUTPATIENT
Start: 2018-02-27 | End: 2018-02-28 | Stop reason: SDUPTHER

## 2017-12-18 RX ORDER — LAMOTRIGINE 200 MG/1
200 TABLET ORAL DAILY
Qty: 90 TABLET | Refills: 3 | Status: SHIPPED | OUTPATIENT
Start: 2017-12-18 | End: 2018-06-25 | Stop reason: SDUPTHER

## 2017-12-18 RX ORDER — AMLODIPINE BESYLATE 5 MG/1
5 TABLET ORAL DAILY
Qty: 90 TABLET | Refills: 3 | Status: SHIPPED | OUTPATIENT
Start: 2017-12-18 | End: 2018-09-24 | Stop reason: SDUPTHER

## 2017-12-18 RX ORDER — ZOLPIDEM TARTRATE 5 MG/1
5 TABLET ORAL NIGHTLY PRN
Qty: 21 TABLET | Refills: 5 | Status: SHIPPED | OUTPATIENT
Start: 2017-12-18 | End: 2018-03-26

## 2017-12-18 RX ORDER — DEXTROAMPHETAMINE SACCHARATE, AMPHETAMINE ASPARTATE, DEXTROAMPHETAMINE SULFATE AND AMPHETAMINE SULFATE 7.5; 7.5; 7.5; 7.5 MG/1; MG/1; MG/1; MG/1
1 TABLET ORAL 2 TIMES DAILY
Qty: 60 TABLET | Refills: 0 | Status: SHIPPED | OUTPATIENT
Start: 2018-01-27 | End: 2018-02-26

## 2017-12-18 NOTE — PROGRESS NOTES
Chief Complaint:    Chief Complaint   Patient presents with    Medication Refill       History of Present Illness:    Presents today for follow-up of chronic medical problems,  He has bipolar, ADHD hypertension.  His medicines working good no side effects.  Also has chronic intermittent insomnia.    ROS:  Review of Systems   Constitutional: Negative for activity change, chills, fatigue, fever and unexpected weight change.   HENT: Negative for congestion, ear discharge, ear pain, hearing loss, postnasal drip and rhinorrhea.    Eyes: Negative for pain and visual disturbance.   Respiratory: Negative for cough, chest tightness and shortness of breath.    Cardiovascular: Negative for chest pain and palpitations.   Gastrointestinal: Negative for abdominal pain, diarrhea and vomiting.   Endocrine: Negative for heat intolerance.   Genitourinary: Negative for dysuria, flank pain, frequency and hematuria.   Musculoskeletal: Negative for back pain, gait problem and neck pain.   Skin: Negative for color change and rash.   Neurological: Negative for dizziness, tremors, seizures, numbness and headaches.   Psychiatric/Behavioral: Negative for agitation, hallucinations, self-injury, sleep disturbance and suicidal ideas. The patient is not nervous/anxious.        Past Medical History:   Diagnosis Date    ADHD (attention deficit hyperactivity disorder)     Bipolar disorder        Social History:  Social History     Social History    Marital status:      Spouse name: N/A    Number of children: N/A    Years of education: N/A     Social History Main Topics    Smoking status: Former Smoker    Smokeless tobacco: Never Used    Alcohol use No    Drug use: No    Sexual activity: Yes     Partners: Female     Birth control/ protection: None, Surgical      Comment: Vasectomy     Other Topics Concern    None     Social History Narrative    None       Family History:   family history includes Diabetes in his father; Heart  "disease in his paternal grandmother; Hypertension in his father and mother.    Health Maintenance   Topic Date Due    TETANUS VACCINE  05/23/1986    Lipid Panel  03/28/2017    Influenza Vaccine  08/01/2017       Physical Exam:    Vital Signs  Temp: 97.3 °F (36.3 °C)  Temp src: Tympanic  Pulse: 105  SpO2: 98 %  BP: 112/78  BP Location: Left arm  Patient Position: Sitting  Pain Score: 0-No pain  Height and Weight  Height: 5' 10" (177.8 cm)  Weight: 91.9 kg (202 lb 9.6 oz)  BSA (Calculated - sq m): 2.13 sq meters  BMI (Calculated): 29.1  Weight in (lb) to have BMI = 25: 173.9]    Body mass index is 29.07 kg/m².    Physical Exam   Constitutional: He is oriented to person, place, and time. He appears well-developed.   HENT:   Mouth/Throat: Oropharynx is clear and moist.   Eyes: Conjunctivae are normal. Pupils are equal, round, and reactive to light.   Neck: Normal range of motion. Neck supple.   Cardiovascular: Normal rate, regular rhythm and normal heart sounds.    No murmur heard.  Pulmonary/Chest: Effort normal and breath sounds normal. No respiratory distress. He has no wheezes. He has no rales. He exhibits no tenderness.   Abdominal: Soft. He exhibits no distension and no mass. There is no tenderness. There is no guarding.   Musculoskeletal: He exhibits no edema or tenderness.   Lymphadenopathy:     He has no cervical adenopathy.   Neurological: He is alert and oriented to person, place, and time. He has normal reflexes.   Skin: Skin is warm and dry.   Psychiatric: He has a normal mood and affect. His behavior is normal. Judgment and thought content normal.       Lab Results   Component Value Date    CHOL 111 (L) 03/28/2016    CHOL 165 06/11/2014    TRIG 99 03/28/2016    TRIG 101 06/11/2014    HDL 40 03/28/2016    HDL 37 (L) 06/11/2014    TOTALCHOLEST 2.8 03/28/2016    TOTALCHOLEST 4.5 06/11/2014    NONHDLCHOL 71 03/28/2016    NONHDLCHOL 128 06/11/2014       Lab Results   Component Value Date    HGBA1C 5.5 " 03/28/2016       Assessment:      ICD-10-CM ICD-9-CM   1. Attention deficit disorder, unspecified hyperactivity presence F98.8 314.00   2. Psychophysiological insomnia F51.04 307.42   3. Bipolar disorder, in full remission, most recent episode mixed F31.78 296.66         Plan:  Medicine refilled  looked up.  Follow-up 3 months  No orders of the defined types were placed in this encounter.      Current Outpatient Prescriptions   Medication Sig Dispense Refill    amLODIPine (NORVASC) 5 MG tablet Take 1 tablet (5 mg total) by mouth once daily. 90 tablet 3    [START ON 12/27/2017] dextroamphetamine-amphetamine 30 mg Tab Take 1 tablet by mouth 2 (two) times daily. 60 tablet 0    [START ON 1/27/2018] dextroamphetamine-amphetamine 30 mg Tab Take 1 tablet by mouth 2 (two) times daily. 60 tablet 0    lamoTRIgine (LAMICTAL) 200 MG tablet Take 1 tablet (200 mg total) by mouth once daily. 90 tablet 3    lisinopril 10 MG tablet Take 1 tablet (10 mg total) by mouth once daily. 90 tablet 3    NASONEX 50 mcg/actuation nasal spray 2 sprays by Nasal route daily as needed.  5    zolpidem (AMBIEN) 5 MG Tab Take 1 tablet (5 mg total) by mouth nightly as needed. 21 tablet 5    [START ON 2/27/2018] dextroamphetamine-amphetamine 30 mg Tab Take 1 tablet by mouth 2 (two) times daily. 60 tablet 0     No current facility-administered medications for this visit.        Medications Discontinued During This Encounter   Medication Reason    meloxicam (MOBIC) 7.5 MG tablet Patient no longer taking    dextroamphetamine-amphetamine 30 mg Tab Reorder    dextroamphetamine-amphetamine 30 mg Tab Reorder    zolpidem (AMBIEN) 5 MG Tab Reorder    lamotrigine (LAMICTAL) 200 MG tablet Reorder    amlodipine (NORVASC) 5 MG tablet Reorder    lisinopril 10 MG tablet Reorder       No Follow-up on file.      Oscar Rodríguez MD

## 2018-02-28 RX ORDER — DEXTROAMPHETAMINE SACCHARATE, AMPHETAMINE ASPARTATE, DEXTROAMPHETAMINE SULFATE AND AMPHETAMINE SULFATE 7.5; 7.5; 7.5; 7.5 MG/1; MG/1; MG/1; MG/1
1 TABLET ORAL 2 TIMES DAILY
Qty: 60 TABLET | Refills: 0 | Status: SHIPPED | OUTPATIENT
Start: 2018-02-28 | End: 2018-03-26

## 2018-02-28 NOTE — TELEPHONE ENCOUNTER
"Spoke with pt and he has got his Adderall  rx's messed up.  He came in a month early last time.  He was seen in October 23rd  and was given 3 rx dated to start 10/27/18, 11/27/18 and 12/27/18    He came in again December 18th, for a well check and we gave him 3 rx at that visit as well dated to start 12/27/18, 01/27/18 and 02/27/18.    He went took his last rx to the pharmacy and it happened to be the last rx from the visit in October dated for 12/27/18.    He is not able to locate the rx from 12/18/17 dated 02/27/18.  I checked the  and it has not been filled. 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   I told him we do not replace lost or stolen rx .  I let him know I would give you the information , he is requesting an new rx   Please advise   "

## 2018-02-28 NOTE — TELEPHONE ENCOUNTER
Okay to fill the last rx, we should send it electronically to avoid this confusion. Please send erx

## 2018-02-28 NOTE — TELEPHONE ENCOUNTER
----- Message from Geovanni Scott sent at 2/28/2018  8:05 AM CST -----  Contact: self [phone]  Would like to consult with nurse regarding issue with refill prescription for Adderall.   Please call back at [phone].  Md Rosie

## 2018-03-26 ENCOUNTER — OFFICE VISIT (OUTPATIENT)
Dept: FAMILY MEDICINE | Facility: CLINIC | Age: 48
End: 2018-03-26
Payer: COMMERCIAL

## 2018-03-26 VITALS
HEART RATE: 86 BPM | TEMPERATURE: 98 F | BODY MASS INDEX: 29.68 KG/M2 | OXYGEN SATURATION: 96 % | DIASTOLIC BLOOD PRESSURE: 80 MMHG | WEIGHT: 207.31 LBS | HEIGHT: 70 IN | SYSTOLIC BLOOD PRESSURE: 120 MMHG

## 2018-03-26 DIAGNOSIS — F98.8 ATTENTION DEFICIT DISORDER, UNSPECIFIED HYPERACTIVITY PRESENCE: Primary | ICD-10-CM

## 2018-03-26 PROCEDURE — 99999 PR PBB SHADOW E&M-EST. PATIENT-LVL III: CPT | Mod: PBBFAC,,, | Performed by: FAMILY MEDICINE

## 2018-03-26 PROCEDURE — 99213 OFFICE O/P EST LOW 20 MIN: CPT | Mod: S$GLB,,, | Performed by: FAMILY MEDICINE

## 2018-03-26 RX ORDER — DEXTROAMPHETAMINE SACCHARATE, AMPHETAMINE ASPARTATE MONOHYDRATE, DEXTROAMPHETAMINE SULFATE AND AMPHETAMINE SULFATE 5; 5; 5; 5 MG/1; MG/1; MG/1; MG/1
20 CAPSULE, EXTENDED RELEASE ORAL EVERY MORNING
Qty: 30 CAPSULE | Refills: 0 | Status: SHIPPED | OUTPATIENT
Start: 2018-03-26 | End: 2018-04-24

## 2018-03-26 NOTE — PROGRESS NOTES
Chief Complaint:    Chief Complaint   Patient presents with    Medication Refill     med refill ADHD       History of Present Illness:    Pt is here for f/u of ADD/ADHD. Doing well, no side effect from the medications, meds helping him concentrate and do what needs done.  He was taking Adderall 30 mg twice a day he wants to lower to 20 mg because it makes him more jittery.    ROS:  Review of Systems   Constitutional: Negative for activity change, appetite change and unexpected weight change.   Cardiovascular: Negative for palpitations.   Gastrointestinal: Negative for abdominal pain.   Musculoskeletal: Negative for myalgias.   Neurological: Negative for dizziness, tremors, light-headedness and headaches.   Psychiatric/Behavioral: Negative for agitation, behavioral problems, confusion, decreased concentration, dysphoric mood, hallucinations, sleep disturbance and suicidal ideas. The patient is not nervous/anxious and is not hyperactive.      Past Medical History:   Diagnosis Date    ADHD (attention deficit hyperactivity disorder)     Bipolar disorder        Social History:  Social History     Social History    Marital status:      Spouse name: N/A    Number of children: N/A    Years of education: N/A     Social History Main Topics    Smoking status: Former Smoker    Smokeless tobacco: Never Used    Alcohol use No    Drug use: No    Sexual activity: Yes     Partners: Female     Birth control/ protection: None, Surgical      Comment: Vasectomy     Other Topics Concern    None     Social History Narrative    None       Family History:   family history includes Diabetes in his father; Heart disease in his paternal grandmother; Hypertension in his father and mother.    Health Maintenance   Topic Date Due    TETANUS VACCINE  05/23/1986    Lipid Panel  03/28/2017    Influenza Vaccine  08/01/2017       Physical Exam:    Vital Signs  Temp: 97.8 °F (36.6 °C)  Temp src: Tympanic  Pulse: 86  SpO2: 96  "%  BP: 120/80  BP Location: Left arm  Patient Position: Sitting  Pain Score: 0-No pain  Height and Weight  Height: 5' 10" (177.8 cm)  Weight: 94 kg (207 lb 5.5 oz)  BSA (Calculated - sq m): 2.16 sq meters  BMI (Calculated): 29.8  Weight in (lb) to have BMI = 25: 173.9]     Physical Exam   Constitutional: She appears well-developed.   HENT:   Mouth/Throat: Oropharynx is clear and moist.   Eyes: Conjunctivae are normal. Pupils are equal, round, and reactive to light.   Neck: Normal range of motion.   Cardiovascular: Normal rate, regular rhythm and normal heart sounds.    Pulmonary/Chest: Breath sounds normal.   Musculoskeletal: Normal range of motion.   Neurological: She is alert. Coordination normal.   Psychiatric: She has a normal mood and affect. Her behavior is normal. Judgment and thought content normal.       Body mass index is 29.75 kg/m².      Assessment:      ICD-10-CM ICD-9-CM   1. Attention deficit disorder, unspecified hyperactivity presence F98.8 314.00         Plan:  We'll try him on Adderall XR 20 mg daily, prescription for one month given if it works for him he's been instructed to call back for 2 more prescriptions.  If not the next prescription will be changed to Adderall plain 20 mg twice a day   verified and 1 prescription sent   F/u 3 mos      No orders of the defined types were placed in this encounter.      Current Outpatient Prescriptions   Medication Sig Dispense Refill    amLODIPine (NORVASC) 5 MG tablet Take 1 tablet (5 mg total) by mouth once daily. 90 tablet 3    lamoTRIgine (LAMICTAL) 200 MG tablet Take 1 tablet (200 mg total) by mouth once daily. 90 tablet 3    lisinopril 10 MG tablet Take 1 tablet (10 mg total) by mouth once daily. 90 tablet 3    NASONEX 50 mcg/actuation nasal spray 2 sprays by Nasal route daily as needed.  5    dextroamphetamine-amphetamine (ADDERALL XR) 20 MG 24 hr capsule Take 1 capsule (20 mg total) by mouth every morning. 30 capsule 0     No current " facility-administered medications for this visit.        Medications Discontinued During This Encounter   Medication Reason    zolpidem (AMBIEN) 5 MG Tab Patient no longer taking    dextroamphetamine-amphetamine 30 mg Tab     dextroamphetamine-amphetamine 30 mg Tab     dextroamphetamine-amphetamine 30 mg Tab     dextroamphetamine-amphetamine 30 mg Tab     dextroamphetamine-amphetamine 30 mg Tab     dextroamphetamine-amphetamine 30 mg Tab              Oscar Rodríguez MD

## 2018-04-23 ENCOUNTER — TELEPHONE (OUTPATIENT)
Dept: FAMILY MEDICINE | Facility: CLINIC | Age: 48
End: 2018-04-23

## 2018-04-23 NOTE — TELEPHONE ENCOUNTER
----- Message from Debra Beckman sent at 4/23/2018 11:09 AM CDT -----  Contact: self   Patient would like to consult with nurse regarding increasing dosage on adderall medication. Please call back ar [phone].      Thanks,  Debra Beckman

## 2018-04-24 RX ORDER — DEXTROAMPHETAMINE SACCHARATE, AMPHETAMINE ASPARTATE MONOHYDRATE, DEXTROAMPHETAMINE SULFATE AND AMPHETAMINE SULFATE 7.5; 7.5; 7.5; 7.5 MG/1; MG/1; MG/1; MG/1
30 CAPSULE, EXTENDED RELEASE ORAL EVERY MORNING
Qty: 30 CAPSULE | Refills: 0 | Status: SHIPPED | OUTPATIENT
Start: 2018-04-24 | End: 2018-05-26 | Stop reason: SDUPTHER

## 2018-05-25 ENCOUNTER — NURSE TRIAGE (OUTPATIENT)
Dept: ADMINISTRATIVE | Facility: CLINIC | Age: 48
End: 2018-05-25

## 2018-05-25 NOTE — TELEPHONE ENCOUNTER
"  Reason for Disposition   Caller requesting a NON-URGENT new prescription or refill and triager unable to refill per unit policy    Answer Assessment - Initial Assessment Questions  1. SYMPTOMS: "Do you have any symptoms?"      "I need my adderall refilled.   2. SEVERITY: If symptoms are present, ask "Are they mild, moderate or severe?"      n/a    Protocols used: ST MEDICATION QUESTION CALL-A-AH    Mr. Casanova called for a refill of the Adderall because his pharmacy told him the md didn't send it over. Informed Mr. Casanova that I would send a message to his pcp for refill, but I couldn't guarantee he will get to it before Monday. He verbalized understanding.   "

## 2018-05-26 RX ORDER — DEXTROAMPHETAMINE SACCHARATE, AMPHETAMINE ASPARTATE MONOHYDRATE, DEXTROAMPHETAMINE SULFATE AND AMPHETAMINE SULFATE 7.5; 7.5; 7.5; 7.5 MG/1; MG/1; MG/1; MG/1
30 CAPSULE, EXTENDED RELEASE ORAL EVERY MORNING
Qty: 30 CAPSULE | Refills: 0 | Status: SHIPPED | OUTPATIENT
Start: 2018-05-26 | End: 2018-06-25 | Stop reason: SDUPTHER

## 2018-06-25 ENCOUNTER — OFFICE VISIT (OUTPATIENT)
Dept: FAMILY MEDICINE | Facility: CLINIC | Age: 48
End: 2018-06-25
Payer: COMMERCIAL

## 2018-06-25 ENCOUNTER — LAB VISIT (OUTPATIENT)
Dept: LAB | Facility: HOSPITAL | Age: 48
End: 2018-06-25
Attending: FAMILY MEDICINE
Payer: COMMERCIAL

## 2018-06-25 VITALS
DIASTOLIC BLOOD PRESSURE: 83 MMHG | OXYGEN SATURATION: 98 % | TEMPERATURE: 98 F | HEART RATE: 100 BPM | BODY MASS INDEX: 30.55 KG/M2 | HEIGHT: 70 IN | SYSTOLIC BLOOD PRESSURE: 127 MMHG | WEIGHT: 213.38 LBS

## 2018-06-25 DIAGNOSIS — I10 ESSENTIAL HYPERTENSION: ICD-10-CM

## 2018-06-25 DIAGNOSIS — F31.78 BIPOLAR DISORDER, IN FULL REMISSION, MOST RECENT EPISODE MIXED: ICD-10-CM

## 2018-06-25 DIAGNOSIS — R41.3 MEMORY LOSS: ICD-10-CM

## 2018-06-25 DIAGNOSIS — F98.8 ATTENTION DEFICIT DISORDER, UNSPECIFIED HYPERACTIVITY PRESENCE: Primary | ICD-10-CM

## 2018-06-25 LAB
AMPHET+METHAMPHET UR QL: ABNORMAL
BARBITURATES UR QL SCN>200 NG/ML: NEGATIVE
BENZODIAZ UR QL SCN>200 NG/ML: NEGATIVE
BZE UR QL SCN: NEGATIVE
CANNABINOIDS UR QL SCN: NEGATIVE
CREAT UR-MCNC: 441 MG/DL
ETHANOL UR-MCNC: <10 MG/DL
METHADONE UR QL SCN>300 NG/ML: NEGATIVE
OPIATES UR QL SCN: NEGATIVE
PCP UR QL SCN>25 NG/ML: NEGATIVE
TOXICOLOGY INFORMATION: ABNORMAL
TSH SERPL DL<=0.005 MIU/L-ACNC: 1.32 UIU/ML
VIT B12 SERPL-MCNC: 517 PG/ML

## 2018-06-25 PROCEDURE — 80307 DRUG TEST PRSMV CHEM ANLYZR: CPT

## 2018-06-25 PROCEDURE — 36415 COLL VENOUS BLD VENIPUNCTURE: CPT | Mod: PO

## 2018-06-25 PROCEDURE — 99999 PR PBB SHADOW E&M-EST. PATIENT-LVL III: CPT | Mod: PBBFAC,,, | Performed by: FAMILY MEDICINE

## 2018-06-25 PROCEDURE — 84443 ASSAY THYROID STIM HORMONE: CPT

## 2018-06-25 PROCEDURE — 99214 OFFICE O/P EST MOD 30 MIN: CPT | Mod: S$GLB,,, | Performed by: FAMILY MEDICINE

## 2018-06-25 PROCEDURE — 82607 VITAMIN B-12: CPT

## 2018-06-25 RX ORDER — LAMOTRIGINE 200 MG/1
200 TABLET ORAL DAILY
Qty: 90 TABLET | Refills: 3 | Status: SHIPPED | OUTPATIENT
Start: 2018-06-25 | End: 2018-09-24 | Stop reason: SDUPTHER

## 2018-06-25 RX ORDER — DEXTROAMPHETAMINE SACCHARATE, AMPHETAMINE ASPARTATE MONOHYDRATE, DEXTROAMPHETAMINE SULFATE AND AMPHETAMINE SULFATE 7.5; 7.5; 7.5; 7.5 MG/1; MG/1; MG/1; MG/1
30 CAPSULE, EXTENDED RELEASE ORAL EVERY MORNING
Qty: 30 CAPSULE | Refills: 0 | Status: SHIPPED | OUTPATIENT
Start: 2018-08-26 | End: 2018-09-24 | Stop reason: SDUPTHER

## 2018-06-25 RX ORDER — DEXTROAMPHETAMINE SACCHARATE, AMPHETAMINE ASPARTATE MONOHYDRATE, DEXTROAMPHETAMINE SULFATE AND AMPHETAMINE SULFATE 7.5; 7.5; 7.5; 7.5 MG/1; MG/1; MG/1; MG/1
30 CAPSULE, EXTENDED RELEASE ORAL EVERY MORNING
Qty: 30 CAPSULE | Refills: 0 | Status: SHIPPED | OUTPATIENT
Start: 2018-07-26 | End: 2018-09-24 | Stop reason: SDUPTHER

## 2018-06-25 RX ORDER — DEXTROAMPHETAMINE SACCHARATE, AMPHETAMINE ASPARTATE MONOHYDRATE, DEXTROAMPHETAMINE SULFATE AND AMPHETAMINE SULFATE 7.5; 7.5; 7.5; 7.5 MG/1; MG/1; MG/1; MG/1
30 CAPSULE, EXTENDED RELEASE ORAL EVERY MORNING
Qty: 30 CAPSULE | Refills: 0 | Status: SHIPPED | OUTPATIENT
Start: 2018-06-26 | End: 2018-09-24 | Stop reason: SDUPTHER

## 2018-06-25 NOTE — PROGRESS NOTES
Chief Complaint:    Chief Complaint   Patient presents with    Medication Refill       History of Present Illness:    He presents today he said he had stopped his blood pressure medicine for some time when he noticed that he was having some memory loss and 10 to improve any got off of the blood pressure medicine but then his blood pressure started to go up so he got back on it.  He says he is not taking Ambien anymore because that also makes memory loss.    ADHD medicine working good  He does acknowledge poor sleep mostly on work days he is sleeping about 6-8 hours.    ROS:  Review of Systems   Constitutional: Negative for activity change, chills, fatigue, fever and unexpected weight change.   HENT: Negative for congestion, ear discharge, ear pain, hearing loss, postnasal drip and rhinorrhea.    Eyes: Negative for pain and visual disturbance.   Respiratory: Negative for cough, chest tightness and shortness of breath.    Cardiovascular: Negative for chest pain and palpitations.   Gastrointestinal: Negative for abdominal pain, diarrhea and vomiting.   Endocrine: Negative for heat intolerance.   Genitourinary: Negative for dysuria, flank pain, frequency and hematuria.   Musculoskeletal: Negative for back pain, gait problem and neck pain.   Skin: Negative for color change and rash.   Neurological: Negative for dizziness, tremors, seizures, numbness and headaches.   Psychiatric/Behavioral: Negative for agitation, hallucinations, self-injury, sleep disturbance and suicidal ideas. The patient is not nervous/anxious.        Past Medical History:   Diagnosis Date    ADHD (attention deficit hyperactivity disorder)     Bipolar disorder        Social History:  Social History     Social History    Marital status:      Spouse name: N/A    Number of children: N/A    Years of education: N/A     Social History Main Topics    Smoking status: Former Smoker    Smokeless tobacco: Never Used    Alcohol use No    Drug  "use: No    Sexual activity: Yes     Partners: Female     Birth control/ protection: None, Surgical      Comment: Vasectomy     Other Topics Concern    None     Social History Narrative    None       Family History:   family history includes Diabetes in his father; Heart disease in his paternal grandmother; Hypertension in his father and mother.    Health Maintenance   Topic Date Due    TETANUS VACCINE  05/23/1986    Pneumococcal PPSV23 (Medium Risk) (1) 05/23/1986    Lipid Panel  03/28/2017    Colonoscopy  05/23/2018    Influenza Vaccine  08/01/2018       Physical Exam:    Vital Signs  Temp: 97.9 °F (36.6 °C)  Temp src: Tympanic  Pulse: 100  SpO2: 98 %  BP: 127/83  BP Location: Left arm  Patient Position: Sitting  Pain Score: 0-No pain  Height and Weight  Height: 5' 10" (177.8 cm)  Weight: 96.8 kg (213 lb 6.5 oz)  BSA (Calculated - sq m): 2.19 sq meters  BMI (Calculated): 30.7  Weight in (lb) to have BMI = 25: 173.9]    Body mass index is 30.62 kg/m².    Physical Exam   Constitutional: He is oriented to person, place, and time. He appears well-developed.   HENT:   Mouth/Throat: Oropharynx is clear and moist.   Eyes: Conjunctivae are normal. Pupils are equal, round, and reactive to light.   Neck: Normal range of motion. Neck supple.   Cardiovascular: Normal rate, regular rhythm and normal heart sounds.    No murmur heard.  Pulmonary/Chest: Effort normal and breath sounds normal. No respiratory distress. He has no wheezes. He has no rales. He exhibits no tenderness.   Abdominal: Soft. He exhibits no distension and no mass. There is no tenderness. There is no guarding.   Musculoskeletal: He exhibits no edema or tenderness.   Lymphadenopathy:     He has no cervical adenopathy.   Neurological: He is alert and oriented to person, place, and time. He has normal reflexes.   Skin: Skin is warm and dry.   Psychiatric: He has a normal mood and affect. His behavior is normal. Judgment and thought content normal. "       Results for orders placed or performed in visit on 04/27/16   TOXICOLOGY SCREEN, URINE, RANDOM (COMPLIANCE)   Result Value Ref Range    Alcohol, Urine <10 <10 mg/dL    Benzodiazepines Negative     Methadone metabolites Negative     Cocaine (Metab.) Negative     Opiate Scrn, Ur Negative     Barbiturate Screen, Ur Negative     Amphetamine Screen, Ur Presumptive Positive     THC Negative     Phencyclidine Negative     Creatinine, Random Ur 114.0 23.0 - 375.0 mg/dL    Toxicology Information SEE COMMENT          Lab Results   Component Value Date    HGBA1C 5.5 03/28/2016       Assessment:      ICD-10-CM ICD-9-CM   1. Attention deficit disorder, unspecified hyperactivity presence F98.8 314.00   2. Essential hypertension I10 401.9   3. Bipolar disorder, in full remission, most recent episode mixed F31.78 296.66   4. Memory loss R41.3 780.93         Plan:      Patient did well on his mini mental status examination.  He needs to be on blood pressure medications.    Emphasize that he try to get regular sleep at least 8 hr of a single night.    Will check his TSH and B12  Continue current meds      Orders Placed This Encounter   Procedures    Vitamin B12    TSH    TOXICOLOGY SCREEN, URINE, RANDOM (COMPLIANCE)       Current Outpatient Prescriptions   Medication Sig Dispense Refill    amLODIPine (NORVASC) 5 MG tablet Take 1 tablet (5 mg total) by mouth once daily. 90 tablet 3    lisinopril 10 MG tablet Take 1 tablet (10 mg total) by mouth once daily. 90 tablet 3    NASONEX 50 mcg/actuation nasal spray 2 sprays by Nasal route daily as needed.  5    [START ON 6/26/2018] dextroamphetamine-amphetamine (ADDERALL XR) 30 MG 24 hr capsule Take 1 capsule (30 mg total) by mouth every morning. 30 capsule 0    [START ON 7/26/2018] dextroamphetamine-amphetamine (ADDERALL XR) 30 MG 24 hr capsule Take 1 capsule (30 mg total) by mouth every morning. 30 capsule 0    [START ON 8/26/2018] dextroamphetamine-amphetamine (ADDERALL XR)  30 MG 24 hr capsule Take 1 capsule (30 mg total) by mouth every morning. 30 capsule 0    lamoTRIgine (LAMICTAL) 200 MG tablet Take 1 tablet (200 mg total) by mouth once daily. 90 tablet 3     No current facility-administered medications for this visit.        Medications Discontinued During This Encounter   Medication Reason    dextroamphetamine-amphetamine (ADDERALL XR) 30 MG 24 hr capsule Reorder    lamoTRIgine (LAMICTAL) 200 MG tablet Reorder       No Follow-up on file.      Oscar Rodríguez MD

## 2018-06-27 ENCOUNTER — TELEPHONE (OUTPATIENT)
Dept: FAMILY MEDICINE | Facility: CLINIC | Age: 48
End: 2018-06-27

## 2018-06-27 NOTE — TELEPHONE ENCOUNTER
----- Message from Christian Kevin sent at 6/27/2018 11:41 AM CDT -----  Contact: Pt   States he's rtn nurses call and can be reached at [phone]//rosario/dbw

## 2018-07-26 ENCOUNTER — TELEPHONE (OUTPATIENT)
Dept: FAMILY MEDICINE | Facility: CLINIC | Age: 48
End: 2018-07-26

## 2018-07-26 NOTE — TELEPHONE ENCOUNTER
Pt is working in [facility] and ask for an okay to fill Addreall while there , it is on time , okay to refill

## 2018-07-26 NOTE — TELEPHONE ENCOUNTER
----- Message from Nohelia Hatch sent at 7/26/2018 11:29 AM CDT -----  Dana with [facility] at [phone]//calling to get a verbal order for med// Adderal//she is calling from the [place]please call asap//thanks/lh

## 2018-09-24 ENCOUNTER — OFFICE VISIT (OUTPATIENT)
Dept: FAMILY MEDICINE | Facility: CLINIC | Age: 48
End: 2018-09-24
Payer: COMMERCIAL

## 2018-09-24 VITALS
BODY MASS INDEX: 30.29 KG/M2 | TEMPERATURE: 97 F | HEIGHT: 70 IN | SYSTOLIC BLOOD PRESSURE: 120 MMHG | HEART RATE: 84 BPM | DIASTOLIC BLOOD PRESSURE: 78 MMHG | WEIGHT: 211.56 LBS | OXYGEN SATURATION: 97 %

## 2018-09-24 DIAGNOSIS — I10 ESSENTIAL HYPERTENSION: ICD-10-CM

## 2018-09-24 DIAGNOSIS — F10.11 ALCOHOL ABUSE, IN REMISSION: ICD-10-CM

## 2018-09-24 DIAGNOSIS — F31.78 BIPOLAR DISORDER, IN FULL REMISSION, MOST RECENT EPISODE MIXED: ICD-10-CM

## 2018-09-24 DIAGNOSIS — Z12.11 COLON CANCER SCREENING: ICD-10-CM

## 2018-09-24 DIAGNOSIS — F98.8 ATTENTION DEFICIT DISORDER, UNSPECIFIED HYPERACTIVITY PRESENCE: Primary | ICD-10-CM

## 2018-09-24 PROCEDURE — 90471 IMMUNIZATION ADMIN: CPT | Mod: S$GLB,,, | Performed by: FAMILY MEDICINE

## 2018-09-24 PROCEDURE — 90686 IIV4 VACC NO PRSV 0.5 ML IM: CPT | Mod: S$GLB,,, | Performed by: FAMILY MEDICINE

## 2018-09-24 PROCEDURE — 99214 OFFICE O/P EST MOD 30 MIN: CPT | Mod: 25,S$GLB,, | Performed by: FAMILY MEDICINE

## 2018-09-24 PROCEDURE — 99999 PR PBB SHADOW E&M-EST. PATIENT-LVL IV: CPT | Mod: PBBFAC,,, | Performed by: FAMILY MEDICINE

## 2018-09-24 RX ORDER — DEXTROAMPHETAMINE SACCHARATE, AMPHETAMINE ASPARTATE MONOHYDRATE, DEXTROAMPHETAMINE SULFATE AND AMPHETAMINE SULFATE 7.5; 7.5; 7.5; 7.5 MG/1; MG/1; MG/1; MG/1
30 CAPSULE, EXTENDED RELEASE ORAL EVERY MORNING
Qty: 30 CAPSULE | Refills: 0 | Status: SHIPPED | OUTPATIENT
Start: 2018-10-24 | End: 2018-12-17 | Stop reason: SDUPTHER

## 2018-09-24 RX ORDER — AMLODIPINE BESYLATE 5 MG/1
5 TABLET ORAL DAILY
Qty: 90 TABLET | Refills: 3 | Status: SHIPPED | OUTPATIENT
Start: 2018-09-24 | End: 2019-03-11 | Stop reason: SDUPTHER

## 2018-09-24 RX ORDER — DEXTROAMPHETAMINE SACCHARATE, AMPHETAMINE ASPARTATE MONOHYDRATE, DEXTROAMPHETAMINE SULFATE AND AMPHETAMINE SULFATE 7.5; 7.5; 7.5; 7.5 MG/1; MG/1; MG/1; MG/1
30 CAPSULE, EXTENDED RELEASE ORAL EVERY MORNING
Qty: 30 CAPSULE | Refills: 0 | Status: SHIPPED | OUTPATIENT
Start: 2018-11-24 | End: 2018-12-17 | Stop reason: SDUPTHER

## 2018-09-24 RX ORDER — LAMOTRIGINE 200 MG/1
200 TABLET ORAL DAILY
Qty: 90 TABLET | Refills: 3 | Status: SHIPPED | OUTPATIENT
Start: 2018-09-24 | End: 2019-03-11 | Stop reason: SDUPTHER

## 2018-09-24 RX ORDER — LISINOPRIL 10 MG/1
10 TABLET ORAL DAILY
Qty: 90 TABLET | Refills: 3 | Status: SHIPPED | OUTPATIENT
Start: 2018-09-24 | End: 2019-03-11 | Stop reason: SDUPTHER

## 2018-09-24 RX ORDER — DEXTROAMPHETAMINE SACCHARATE, AMPHETAMINE ASPARTATE MONOHYDRATE, DEXTROAMPHETAMINE SULFATE AND AMPHETAMINE SULFATE 7.5; 7.5; 7.5; 7.5 MG/1; MG/1; MG/1; MG/1
30 CAPSULE, EXTENDED RELEASE ORAL EVERY MORNING
Qty: 30 CAPSULE | Refills: 0 | Status: SHIPPED | OUTPATIENT
Start: 2018-09-24 | End: 2018-12-17 | Stop reason: SDUPTHER

## 2018-09-24 NOTE — PROGRESS NOTES
Chief Complaint:    Chief Complaint   Patient presents with    Medication Refill       History of Present Illness:    He is here for follow-up:  He is on ADHD medication working good no side effects  Blood pressure is normal  He has been having some back troubles he is following with the spine surgeon.  ROS:  Review of Systems   Constitutional: Negative for activity change, chills, fatigue, fever and unexpected weight change.   HENT: Negative for congestion, ear discharge, ear pain, hearing loss, postnasal drip and rhinorrhea.    Eyes: Negative for pain and visual disturbance.   Respiratory: Negative for cough, chest tightness and shortness of breath.    Cardiovascular: Negative for chest pain and palpitations.   Gastrointestinal: Negative for abdominal pain, diarrhea and vomiting.   Endocrine: Negative for heat intolerance.   Genitourinary: Negative for dysuria, flank pain, frequency and hematuria.   Musculoskeletal: Negative for back pain, gait problem and neck pain.   Skin: Negative for color change and rash.   Neurological: Negative for dizziness, tremors, seizures, numbness and headaches.   Psychiatric/Behavioral: Negative for agitation, hallucinations, self-injury, sleep disturbance and suicidal ideas. The patient is not nervous/anxious.        Past Medical History:   Diagnosis Date    ADHD (attention deficit hyperactivity disorder)     Bipolar disorder        Social History:  Social History     Socioeconomic History    Marital status:      Spouse name: None    Number of children: None    Years of education: None    Highest education level: None   Social Needs    Financial resource strain: None    Food insecurity - worry: None    Food insecurity - inability: None    Transportation needs - medical: None    Transportation needs - non-medical: None   Occupational History    None   Tobacco Use    Smoking status: Former Smoker    Smokeless tobacco: Never Used   Substance and Sexual Activity  "   Alcohol use: No    Drug use: No    Sexual activity: Yes     Partners: Female     Birth control/protection: None, Surgical     Comment: Vasectomy   Other Topics Concern    None   Social History Narrative    None       Family History:   family history includes Diabetes in his father; Heart disease in his paternal grandmother; Hypertension in his father and mother.    Health Maintenance   Topic Date Due    TETANUS VACCINE  05/23/1986    Pneumococcal PPSV23 (Medium Risk) (1) 05/23/1986    Lipid Panel  03/28/2017    Colonoscopy  05/23/2018    Influenza Vaccine  08/01/2018       Physical Exam:    Vital Signs  Temp: 97.3 °F (36.3 °C)  Temp src: Tympanic  Pulse: 84  SpO2: 97 %  BP: 120/78  BP Location: Left arm  Patient Position: Sitting  Pain Score: 0-No pain  Height and Weight  Height: 5' 10" (177.8 cm)  Weight: 95.9 kg (211 lb 8.5 oz)  BSA (Calculated - sq m): 2.18 sq meters  BMI (Calculated): 30.4  Weight in (lb) to have BMI = 25: 173.9]    Body mass index is 30.35 kg/m².    Physical Exam   Constitutional: He is oriented to person, place, and time. He appears well-developed.   HENT:   Mouth/Throat: Oropharynx is clear and moist.   Eyes: Conjunctivae are normal. Pupils are equal, round, and reactive to light.   Neck: Normal range of motion. Neck supple.   Cardiovascular: Normal rate, regular rhythm and normal heart sounds.   No murmur heard.  Pulmonary/Chest: Effort normal and breath sounds normal. No respiratory distress. He has no wheezes. He has no rales. He exhibits no tenderness.   Abdominal: Soft. He exhibits no distension and no mass. There is no tenderness. There is no guarding.   Musculoskeletal: He exhibits no edema or tenderness.   Lymphadenopathy:     He has no cervical adenopathy.   Neurological: He is alert and oriented to person, place, and time. He has normal reflexes.   Skin: Skin is warm and dry.   Psychiatric: He has a normal mood and affect. His behavior is normal. Judgment and thought " content normal.       Results for orders placed or performed in visit on 06/25/18   Vitamin B12   Result Value Ref Range    Vitamin B-12 517 210 - 950 pg/mL   TSH   Result Value Ref Range    TSH 1.325 0.400 - 4.000 uIU/mL         Lab Results   Component Value Date    HGBA1C 5.5 03/28/2016       Assessment:      ICD-10-CM ICD-9-CM   1. Attention deficit disorder, unspecified hyperactivity presence F98.8 314.00   2. Essential hypertension I10 401.9   3. Bipolar disorder, in full remission, most recent episode mixed F31.78 296.66   4. Alcohol abuse, in remission F10.11 305.03   5. Colon cancer screening Z12.11 V76.51         Plan:     Continue current meds and plan.  Medical problems as above stable    Orders Placed This Encounter   Procedures    Influenza - Quadrivalent (3 years & older) (PF)    Hemoglobin A1c    Lipid panel    Comprehensive metabolic panel    PSA, Screening       Current Outpatient Medications   Medication Sig Dispense Refill    amLODIPine (NORVASC) 5 MG tablet Take 1 tablet (5 mg total) by mouth once daily. 90 tablet 3    [START ON 11/24/2018] dextroamphetamine-amphetamine (ADDERALL XR) 30 MG 24 hr capsule Take 1 capsule (30 mg total) by mouth every morning. 30 capsule 0    [START ON 10/24/2018] dextroamphetamine-amphetamine (ADDERALL XR) 30 MG 24 hr capsule Take 1 capsule (30 mg total) by mouth every morning. 30 capsule 0    dextroamphetamine-amphetamine (ADDERALL XR) 30 MG 24 hr capsule Take 1 capsule (30 mg total) by mouth every morning. 30 capsule 0    lamoTRIgine (LAMICTAL) 200 MG tablet Take 1 tablet (200 mg total) by mouth once daily. 90 tablet 3    lisinopril 10 MG tablet Take 1 tablet (10 mg total) by mouth once daily. 90 tablet 3    NASONEX 50 mcg/actuation nasal spray 2 sprays by Nasal route daily as needed.  5     No current facility-administered medications for this visit.        Medications Discontinued During This Encounter   Medication Reason    amLODIPine (NORVASC) 5 MG  tablet Reorder    dextroamphetamine-amphetamine (ADDERALL XR) 30 MG 24 hr capsule Reorder    dextroamphetamine-amphetamine (ADDERALL XR) 30 MG 24 hr capsule Reorder    dextroamphetamine-amphetamine (ADDERALL XR) 30 MG 24 hr capsule Reorder    lamoTRIgine (LAMICTAL) 200 MG tablet Reorder    lisinopril 10 MG tablet Reorder       No Follow-up on file.      Oscar Rodríguez MD

## 2018-09-28 ENCOUNTER — DOCUMENTATION ONLY (OUTPATIENT)
Dept: ENDOSCOPY | Facility: HOSPITAL | Age: 48
End: 2018-09-28

## 2018-10-11 ENCOUNTER — DOCUMENTATION ONLY (OUTPATIENT)
Dept: ENDOSCOPY | Facility: HOSPITAL | Age: 48
End: 2018-10-11

## 2018-12-13 ENCOUNTER — LAB VISIT (OUTPATIENT)
Dept: LAB | Facility: HOSPITAL | Age: 48
End: 2018-12-13
Attending: FAMILY MEDICINE
Payer: COMMERCIAL

## 2018-12-13 DIAGNOSIS — F98.8 ATTENTION DEFICIT DISORDER, UNSPECIFIED HYPERACTIVITY PRESENCE: ICD-10-CM

## 2018-12-13 DIAGNOSIS — I10 ESSENTIAL HYPERTENSION: ICD-10-CM

## 2018-12-13 LAB
ALBUMIN SERPL BCP-MCNC: 4.1 G/DL
ALP SERPL-CCNC: 78 U/L
ALT SERPL W/O P-5'-P-CCNC: 26 U/L
ANION GAP SERPL CALC-SCNC: 7 MMOL/L
AST SERPL-CCNC: 24 U/L
BILIRUB SERPL-MCNC: 0.7 MG/DL
BUN SERPL-MCNC: 10 MG/DL
CALCIUM SERPL-MCNC: 10.2 MG/DL
CHLORIDE SERPL-SCNC: 102 MMOL/L
CHOLEST SERPL-MCNC: 198 MG/DL
CHOLEST/HDLC SERPL: 3.4 {RATIO}
CO2 SERPL-SCNC: 29 MMOL/L
COMPLEXED PSA SERPL-MCNC: 1.3 NG/ML
CREAT SERPL-MCNC: 1.3 MG/DL
EST. GFR  (AFRICAN AMERICAN): >60 ML/MIN/1.73 M^2
EST. GFR  (NON AFRICAN AMERICAN): >60 ML/MIN/1.73 M^2
ESTIMATED AVG GLUCOSE: 108 MG/DL
GLUCOSE SERPL-MCNC: 93 MG/DL
HBA1C MFR BLD HPLC: 5.4 %
HDLC SERPL-MCNC: 58 MG/DL
HDLC SERPL: 29.3 %
LDLC SERPL CALC-MCNC: 120.6 MG/DL
NONHDLC SERPL-MCNC: 140 MG/DL
POTASSIUM SERPL-SCNC: 4.5 MMOL/L
PROT SERPL-MCNC: 7.2 G/DL
SODIUM SERPL-SCNC: 138 MMOL/L
TRIGL SERPL-MCNC: 97 MG/DL

## 2018-12-13 PROCEDURE — 80053 COMPREHEN METABOLIC PANEL: CPT

## 2018-12-13 PROCEDURE — 84153 ASSAY OF PSA TOTAL: CPT

## 2018-12-13 PROCEDURE — 36415 COLL VENOUS BLD VENIPUNCTURE: CPT | Mod: PO

## 2018-12-13 PROCEDURE — 83036 HEMOGLOBIN GLYCOSYLATED A1C: CPT

## 2018-12-13 PROCEDURE — 80061 LIPID PANEL: CPT

## 2018-12-17 ENCOUNTER — TELEPHONE (OUTPATIENT)
Dept: ENDOSCOPY | Facility: HOSPITAL | Age: 48
End: 2018-12-17

## 2018-12-17 ENCOUNTER — OFFICE VISIT (OUTPATIENT)
Dept: FAMILY MEDICINE | Facility: CLINIC | Age: 48
End: 2018-12-17
Payer: COMMERCIAL

## 2018-12-17 ENCOUNTER — DOCUMENTATION ONLY (OUTPATIENT)
Dept: ENDOSCOPY | Facility: HOSPITAL | Age: 48
End: 2018-12-17

## 2018-12-17 VITALS
WEIGHT: 205.56 LBS | SYSTOLIC BLOOD PRESSURE: 128 MMHG | OXYGEN SATURATION: 99 % | HEART RATE: 87 BPM | DIASTOLIC BLOOD PRESSURE: 70 MMHG | HEIGHT: 70 IN | BODY MASS INDEX: 29.43 KG/M2 | TEMPERATURE: 97 F

## 2018-12-17 DIAGNOSIS — Z72.0 CHEWS TOBACCO REGULARLY: ICD-10-CM

## 2018-12-17 DIAGNOSIS — I10 ESSENTIAL HYPERTENSION: ICD-10-CM

## 2018-12-17 DIAGNOSIS — F98.8 ATTENTION DEFICIT DISORDER, UNSPECIFIED HYPERACTIVITY PRESENCE: ICD-10-CM

## 2018-12-17 DIAGNOSIS — E78.2 MIXED HYPERLIPIDEMIA: ICD-10-CM

## 2018-12-17 DIAGNOSIS — Z00.00 WELL ADULT EXAM: Primary | ICD-10-CM

## 2018-12-17 PROCEDURE — 99999 PR PBB SHADOW E&M-EST. PATIENT-LVL III: CPT | Mod: PBBFAC,,, | Performed by: FAMILY MEDICINE

## 2018-12-17 PROCEDURE — 99396 PREV VISIT EST AGE 40-64: CPT | Mod: S$GLB,,, | Performed by: FAMILY MEDICINE

## 2018-12-17 RX ORDER — DEXTROAMPHETAMINE SACCHARATE, AMPHETAMINE ASPARTATE MONOHYDRATE, DEXTROAMPHETAMINE SULFATE AND AMPHETAMINE SULFATE 7.5; 7.5; 7.5; 7.5 MG/1; MG/1; MG/1; MG/1
30 CAPSULE, EXTENDED RELEASE ORAL EVERY MORNING
Qty: 30 CAPSULE | Refills: 0 | Status: SHIPPED | OUTPATIENT
Start: 2019-02-24 | End: 2019-03-11 | Stop reason: SDUPTHER

## 2018-12-17 RX ORDER — DEXTROAMPHETAMINE SACCHARATE, AMPHETAMINE ASPARTATE MONOHYDRATE, DEXTROAMPHETAMINE SULFATE AND AMPHETAMINE SULFATE 7.5; 7.5; 7.5; 7.5 MG/1; MG/1; MG/1; MG/1
30 CAPSULE, EXTENDED RELEASE ORAL EVERY MORNING
Qty: 30 CAPSULE | Refills: 0 | Status: SHIPPED | OUTPATIENT
Start: 2018-12-24 | End: 2019-03-11 | Stop reason: SDUPTHER

## 2018-12-17 RX ORDER — DEXTROAMPHETAMINE SACCHARATE, AMPHETAMINE ASPARTATE MONOHYDRATE, DEXTROAMPHETAMINE SULFATE AND AMPHETAMINE SULFATE 7.5; 7.5; 7.5; 7.5 MG/1; MG/1; MG/1; MG/1
30 CAPSULE, EXTENDED RELEASE ORAL EVERY MORNING
Qty: 30 CAPSULE | Refills: 0 | Status: SHIPPED | OUTPATIENT
Start: 2019-01-24 | End: 2019-03-11 | Stop reason: SDUPTHER

## 2018-12-17 NOTE — TELEPHONE ENCOUNTER
----- Message from Oscar Rodríguez MD sent at 12/17/2018  9:17 AM CST -----  Regarding: colonscopy  Pt is trying to davonte his colonscopy but has not been able to get in touch with GI dept.   Please davonte him a colonscopy appt soon.  Thanks  Oscar Momin MD

## 2018-12-17 NOTE — PROGRESS NOTES
Endoscopy Scheduling Questionnaire:    Call Type:Outgoing call    1. Have you been admitted overnight to the hospital in the past 3 months? no  2. Do you get CP and SOB while walking up a flight of stairs? no  3. Have you had a stent placed in the past 12 months? no  4. Have you had a stroke or heart attack in the past 6 months? no  5. Have you had any chest pain in the past 3 months? no      If so, have you been evaluated by your PCP or Cardiologist? no  6. Do you take weight loss medications? no  7. Have you been diagnosed with Diverticulitis within the past 3 months? no  8. Are you having any GI symptoms that you feel need to be evaluated prior to your procedure? no  9. Are you on dialysis? no  10. Are you diabetic? no  11. Do you have any other health issues that you feel might limit your ability to safely have the procedure and/or sedation? no  12. Is the patient over 79 yo? no        If so, has the patient been seen by their PCP or GI in the last 3 months? N/A       -I have reviewed the last colonoscopy for recommendations regarding surveillance and bowel prep  is NA  -I have reviewed the patient's medications and allergies. He is not on high risk medications and will require cardiac clearance. A clearance request NA  -I have verified the pharmacy information. The prep being used is Suprep. The patient's prep instructions were sent via mail..    Date Endoscopy Scheduled: Date: 2/25/19  Or  Date Gastro office visit Scheduled: NA

## 2018-12-17 NOTE — PROGRESS NOTES
Chief Complaint:    Chief Complaint   Patient presents with    Follow-up       History of Present Illness:    Patient presents today for a physical.  He is doing okay his blood pressure running stable he does chew tobacco he had could before but then he started again.  ADHD stable on medication  Lipids are significantly elevated he used to be on a statin drug but then he had memory trouble so he stopped using it.    ROS:  Review of Systems   Constitutional: Negative for activity change, chills, fatigue, fever and unexpected weight change.   HENT: Negative for congestion, ear discharge, ear pain, hearing loss, postnasal drip and rhinorrhea.    Eyes: Negative for pain and visual disturbance.   Respiratory: Negative for cough, chest tightness and shortness of breath.    Cardiovascular: Negative for chest pain and palpitations.   Gastrointestinal: Negative for abdominal pain, diarrhea and vomiting.   Endocrine: Negative for heat intolerance.   Genitourinary: Negative for dysuria, flank pain, frequency and hematuria.   Musculoskeletal: Negative for back pain, gait problem and neck pain.   Skin: Negative for color change and rash.   Neurological: Negative for dizziness, tremors, seizures, numbness and headaches.   Psychiatric/Behavioral: Negative for agitation, hallucinations, self-injury, sleep disturbance and suicidal ideas. The patient is not nervous/anxious.        Past Medical History:   Diagnosis Date    ADHD (attention deficit hyperactivity disorder)     Bipolar disorder        Social History:  Social History     Socioeconomic History    Marital status:      Spouse name: None    Number of children: None    Years of education: None    Highest education level: None   Social Needs    Financial resource strain: None    Food insecurity - worry: None    Food insecurity - inability: None    Transportation needs - medical: None    Transportation needs - non-medical: None   Occupational History    None  "  Tobacco Use    Smoking status: Former Smoker    Smokeless tobacco: Never Used   Substance and Sexual Activity    Alcohol use: No    Drug use: No    Sexual activity: Yes     Partners: Female     Birth control/protection: None, Surgical     Comment: Vasectomy   Other Topics Concern    None   Social History Narrative    None       Family History:   family history includes Diabetes in his father; Heart disease in his paternal grandmother; Hypertension in his father and mother.    Health Maintenance   Topic Date Due    TETANUS VACCINE  05/23/1986    Colonoscopy  05/23/2018    Lipid Panel  12/13/2019    Influenza Vaccine  Completed       Physical Exam:    Vital Signs  Temp: 97 °F (36.1 °C)  Pulse: 87  SpO2: 99 %  BP: 128/70  BP Location: Left arm  Patient Position: Sitting  Height and Weight  Height: 5' 10" (177.8 cm)  Weight: 93.2 kg (205 lb 9.3 oz)  BSA (Calculated - sq m): 2.15 sq meters  BMI (Calculated): 29.6  Weight in (lb) to have BMI = 25: 173.9]    Body mass index is 29.5 kg/m².    Physical Exam   Constitutional: He is oriented to person, place, and time. He appears well-developed.   HENT:   Mouth/Throat: Oropharynx is clear and moist.       Eyes: Conjunctivae are normal. Pupils are equal, round, and reactive to light.   Neck: Normal range of motion. Neck supple.   Cardiovascular: Normal rate, regular rhythm and normal heart sounds.   No murmur heard.  Pulmonary/Chest: Effort normal and breath sounds normal. No respiratory distress. He has no wheezes. He has no rales. He exhibits no tenderness.   Abdominal: Soft. He exhibits no distension and no mass. There is no tenderness. There is no guarding.   Musculoskeletal: He exhibits no edema or tenderness.   Lymphadenopathy:     He has no cervical adenopathy.   Neurological: He is alert and oriented to person, place, and time. He has normal reflexes.   Skin: Skin is warm and dry.   Psychiatric: He has a normal mood and affect. His behavior is normal. " Judgment and thought content normal.         Assessment:      ICD-10-CM ICD-9-CM   1. Well adult exam Z00.00 V70.0   2. Essential hypertension I10 401.9   3. Attention deficit disorder, unspecified hyperactivity presence F98.8 314.00   4. Mixed hyperlipidemia E78.2 272.2   5. 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  Advised him to quit tobacco immediately.  He missed the call from gastric department about his colonoscopy in when he tried to call them back could not get in touch with them.  I will send a request to the Gastroenterology schedulers to call him back.  Patient's  checked and Adderall sent to the pharmacy  Other medical problems stable he is not drinking alcohol anymore.  Follow-up 3 months    No orders of the defined types were placed in this encounter.      Current Outpatient Medications   Medication Sig Dispense Refill    amLODIPine (NORVASC) 5 MG tablet Take 1 tablet (5 mg total) by mouth once daily. 90 tablet 3    [START ON 12/24/2018] dextroamphetamine-amphetamine (ADDERALL XR) 30 MG 24 hr capsule Take 1 capsule (30 mg total) by mouth every morning. 30 capsule 0    [START ON 1/24/2019] dextroamphetamine-amphetamine (ADDERALL XR) 30 MG 24 hr capsule Take 1 capsule (30 mg total) by mouth every morning. 30 capsule 0    [START ON 2/24/2019] dextroamphetamine-amphetamine (ADDERALL XR) 30 MG 24 hr capsule Take 1 capsule (30 mg total) by mouth every morning. 30 capsule 0    lamoTRIgine (LAMICTAL) 200 MG tablet Take 1 tablet (200 mg total) by mouth once daily. 90 tablet 3    lisinopril 10 MG tablet Take 1 tablet (10 mg total) by mouth once daily. 90 tablet 3    NASONEX 50 mcg/actuation nasal spray 2 sprays by Nasal route daily as needed.  5     No current facility-administered medications for this visit.        Medications Discontinued During This Encounter   Medication Reason     dextroamphetamine-amphetamine (ADDERALL XR) 30 MG 24 hr capsule Duplicate Order    dextroamphetamine-amphetamine (ADDERALL XR) 30 MG 24 hr capsule Duplicate Order    dextroamphetamine-amphetamine (ADDERALL XR) 30 MG 24 hr capsule Reorder       No Follow-up on file.      Oscar Rodríguez MD

## 2018-12-18 RX ORDER — SODIUM, POTASSIUM,MAG SULFATES 17.5-3.13G
SOLUTION, RECONSTITUTED, ORAL ORAL
Qty: 354 ML | Refills: 0 | Status: ON HOLD | OUTPATIENT
Start: 2018-12-18 | End: 2019-02-25 | Stop reason: CLARIF

## 2019-02-25 ENCOUNTER — ANESTHESIA EVENT (OUTPATIENT)
Dept: ENDOSCOPY | Facility: HOSPITAL | Age: 49
End: 2019-02-25
Payer: COMMERCIAL

## 2019-02-25 ENCOUNTER — HOSPITAL ENCOUNTER (OUTPATIENT)
Facility: HOSPITAL | Age: 49
Discharge: HOME | End: 2019-02-25
Attending: FAMILY MEDICINE | Admitting: FAMILY MEDICINE
Payer: COMMERCIAL

## 2019-02-25 ENCOUNTER — TELEPHONE (OUTPATIENT)
Dept: ENDOSCOPY | Facility: HOSPITAL | Age: 49
End: 2019-02-25

## 2019-02-25 ENCOUNTER — ANESTHESIA (OUTPATIENT)
Dept: ENDOSCOPY | Facility: HOSPITAL | Age: 49
End: 2019-02-25
Payer: COMMERCIAL

## 2019-02-25 VITALS
TEMPERATURE: 98 F | WEIGHT: 206 LBS | HEIGHT: 70 IN | OXYGEN SATURATION: 98 % | DIASTOLIC BLOOD PRESSURE: 77 MMHG | SYSTOLIC BLOOD PRESSURE: 125 MMHG | BODY MASS INDEX: 29.49 KG/M2 | HEART RATE: 68 BPM | RESPIRATION RATE: 20 BRPM

## 2019-02-25 DIAGNOSIS — Z12.11 COLON CANCER SCREENING: Primary | ICD-10-CM

## 2019-02-25 DIAGNOSIS — Z12.11 SPECIAL SCREENING FOR MALIGNANT NEOPLASMS, COLON: ICD-10-CM

## 2019-02-25 PROBLEM — Z83.719 FAMILY HISTORY OF COLONIC POLYPS: Status: ACTIVE | Noted: 2019-02-25

## 2019-02-25 PROCEDURE — 25000003 PHARM REV CODE 250: Performed by: NURSE ANESTHETIST, CERTIFIED REGISTERED

## 2019-02-25 PROCEDURE — G0121 COLON CA SCRN NOT HI RSK IND: ICD-10-PCS | Mod: 53,,, | Performed by: FAMILY MEDICINE

## 2019-02-25 PROCEDURE — 63600175 PHARM REV CODE 636 W HCPCS: Performed by: NURSE ANESTHETIST, CERTIFIED REGISTERED

## 2019-02-25 PROCEDURE — G0121 COLON CA SCRN NOT HI RSK IND: HCPCS | Performed by: FAMILY MEDICINE

## 2019-02-25 PROCEDURE — 25000003 PHARM REV CODE 250: Performed by: FAMILY MEDICINE

## 2019-02-25 PROCEDURE — G0121 COLON CA SCRN NOT HI RSK IND: HCPCS | Mod: 53,,, | Performed by: FAMILY MEDICINE

## 2019-02-25 PROCEDURE — 37000009 HC ANESTHESIA EA ADD 15 MINS: Performed by: FAMILY MEDICINE

## 2019-02-25 PROCEDURE — 37000008 HC ANESTHESIA 1ST 15 MINUTES: Performed by: FAMILY MEDICINE

## 2019-02-25 RX ORDER — SODIUM CHLORIDE 0.9 % (FLUSH) 0.9 %
3 SYRINGE (ML) INJECTION
Status: CANCELLED | OUTPATIENT
Start: 2019-02-25

## 2019-02-25 RX ORDER — SODIUM CHLORIDE, SODIUM LACTATE, POTASSIUM CHLORIDE, CALCIUM CHLORIDE 600; 310; 30; 20 MG/100ML; MG/100ML; MG/100ML; MG/100ML
INJECTION, SOLUTION INTRAVENOUS CONTINUOUS
Status: CANCELLED | OUTPATIENT
Start: 2019-02-25

## 2019-02-25 RX ORDER — SODIUM CHLORIDE 0.9 % (FLUSH) 0.9 %
3 SYRINGE (ML) INJECTION
Status: DISCONTINUED | OUTPATIENT
Start: 2019-02-25 | End: 2019-02-25 | Stop reason: HOSPADM

## 2019-02-25 RX ORDER — PROPOFOL 10 MG/ML
VIAL (ML) INTRAVENOUS
Status: DISCONTINUED | OUTPATIENT
Start: 2019-02-25 | End: 2019-02-25

## 2019-02-25 RX ORDER — LIDOCAINE HYDROCHLORIDE 10 MG/ML
INJECTION INFILTRATION; PERINEURAL
Status: DISCONTINUED | OUTPATIENT
Start: 2019-02-25 | End: 2019-02-25

## 2019-02-25 RX ORDER — POLYETHYLENE GLYCOL 3350, SODIUM SULFATE ANHYDROUS, SODIUM BICARBONATE, SODIUM CHLORIDE, POTASSIUM CHLORIDE 236; 22.74; 6.74; 5.86; 2.97 G/4L; G/4L; G/4L; G/4L; G/4L
POWDER, FOR SOLUTION ORAL
Qty: 4000 ML | Refills: 0 | Status: SHIPPED | OUTPATIENT
Start: 2019-02-25 | End: 2019-03-11 | Stop reason: ALTCHOICE

## 2019-02-25 RX ORDER — SODIUM CHLORIDE, SODIUM LACTATE, POTASSIUM CHLORIDE, CALCIUM CHLORIDE 600; 310; 30; 20 MG/100ML; MG/100ML; MG/100ML; MG/100ML
INJECTION, SOLUTION INTRAVENOUS CONTINUOUS
Status: DISCONTINUED | OUTPATIENT
Start: 2019-02-25 | End: 2019-02-25 | Stop reason: HOSPADM

## 2019-02-25 RX ADMIN — SODIUM CHLORIDE, SODIUM LACTATE, POTASSIUM CHLORIDE, AND CALCIUM CHLORIDE: .6; .31; .03; .02 INJECTION, SOLUTION INTRAVENOUS at 08:02

## 2019-02-25 RX ADMIN — PROPOFOL 50 MG: 10 INJECTION, EMULSION INTRAVENOUS at 09:02

## 2019-02-25 RX ADMIN — PROPOFOL 110 MG: 10 INJECTION, EMULSION INTRAVENOUS at 09:02

## 2019-02-25 RX ADMIN — LIDOCAINE HYDROCHLORIDE 50 MG: 10 INJECTION, SOLUTION INFILTRATION; PERINEURAL at 09:02

## 2019-02-25 NOTE — H&P
Short Stay Endoscopy History and Physical    PCP - Oscar Rodríguez MD    Procedure - Colonoscopy  ASA - 2  Mallampati - per anesthesia  History of Anesthesia problems - no  Family history Anesthesia problems -  no     HPI:  This is a 48 y.o. male here for evaluation of :   Active Hospital Problems    Diagnosis  POA    *Colon cancer screening [Z12.11]  Not Applicable    Special screening for malignant neoplasms, colon [Z12.11]  Not Applicable      Resolved Hospital Problems   No resolved problems to display.         Health Maintenance       Date Due Completion Date    TETANUS VACCINE 05/23/1986 ---    Colonoscopy 05/23/2018 ---    Lipid Panel 12/13/2019 12/13/2018          Screening - yes  History of polyps - no  Diarrhea - no  Anemia - no  Blood in stools - no  Abdominal pain - no  Other - no    ROS:  CONSTITUTIONAL: Denies weight change,  fatigue, fevers, chills, night sweats.  CARDIOVASCULAR: Denies chest pain, shortness of breath, orthopnea and edema.  RESPIRATORY: Denies cough, hemoptysis, dyspnea, and wheezing.  GI: See HPI.    Medical History:   Past Medical History:   Diagnosis Date    ADHD (attention deficit hyperactivity disorder)     Bipolar disorder     DVT (deep venous thrombosis)     Hypertension        Surgical History:   Past Surgical History:   Procedure Laterality Date    APPENDECTOMY      KNEE SURGERY      right    SHOULDER SURGERY      bilateral    SINUS SURGERY      TONSILLECTOMY      VASECTOMY         Family History:   Family History   Problem Relation Age of Onset    Hypertension Mother     Hypertension Father     Diabetes Father     Heart disease Paternal Grandmother        Social History:   Social History     Tobacco Use    Smoking status: Former Smoker     Packs/day: 1.00     Years: 15.00     Pack years: 15.00     Last attempt to quit: 2/25/2009     Years since quitting: 10.0    Smokeless tobacco: Never Used   Substance Use Topics    Alcohol use: No    Drug use: No        Allergies:   Review of patient's allergies indicates:  No Known Allergies    Medications:   No current facility-administered medications on file prior to encounter.      Current Outpatient Medications on File Prior to Encounter   Medication Sig Dispense Refill    amLODIPine (NORVASC) 5 MG tablet Take 1 tablet (5 mg total) by mouth once daily. 90 tablet 3    lamoTRIgine (LAMICTAL) 200 MG tablet Take 1 tablet (200 mg total) by mouth once daily. 90 tablet 3    lisinopril 10 MG tablet Take 1 tablet (10 mg total) by mouth once daily. 90 tablet 3    NASONEX 50 mcg/actuation nasal spray 2 sprays by Nasal route daily as needed.  5       Physical Exam:  Vital Signs:   Vitals:    02/25/19 0842   BP: (!) 126/91   Pulse: 75   Resp: 14   Temp: 98.1 °F (36.7 °C)     General Appearance: Well appearing in no acute distress  ENT: OP clear  Chest: CTA B  CV: RRR, no m/r/g  Abd: s/nt/nd/nabs  Ext: no edema    Labs:Reviewed    IMP:  Active Hospital Problems    Diagnosis  POA    *Colon cancer screening [Z12.11]  Not Applicable    Special screening for malignant neoplasms, colon [Z12.11]  Not Applicable      Resolved Hospital Problems   No resolved problems to display.         Plan:   I have explained the risks and benefits of colonoscopy to the patient including but not limited to bleeding, perforation, infection, and death. The patient wishes to proceed.

## 2019-02-25 NOTE — DISCHARGE INSTRUCTIONS
Colonoscopy     A camera attached to a flexible tube with a viewing lens is used to take video pictures.     Colonoscopy is a test to view the inside of your lower digestive tract (colon and rectum). Sometimes it can show the last part of the small intestine (ileum). During the test, small pieces of tissue may be removed for testing. This is called a biopsy. Small growths, such as polyps, may also be removed.   Why is colonoscopy done?  The test is done to help look for colon cancer. And it can help find the source of abdominal pain, bleeding, and changes in bowel habits. It may be needed once a year, depending on factors such as your:  · Age  · Health history  · Family health history  · Symptoms  · Results from any prior colonoscopy  Risks and possible complications  These include:  · Bleeding               · A puncture or tear in the colon   · Risks of anesthesia  · A cancer lesion not being seen  Getting ready   To prepare for the test:  · Talk with your healthcare provider about the risks of the test (see below). Also ask your healthcare provider about alternatives to the test.  · Tell your healthcare provider about any medicines you take. Also tell him or her about any health conditions you may have.  · Make sure your rectum and colon are empty for the test. Follow the diet and bowel prep instructions exactly. If you dont, the test may need to be rescheduled.  · Plan for a friend or family member to drive you home after the test.     Colonoscopy provides an inside view of the entire colon.     You may discuss the results with your doctor right away or at a future visit.  During the test   The test is usually done in the hospital on an outpatient basis. This means you go home the same day. The procedure takes about 30 minutes. During that time:  · You are given relaxing (sedating) medicine through an IV line. You may be drowsy, or fully asleep.  · The healthcare provider will first give you a physical exam to  check for anal and rectal problems.  · Then the anus is lubricated and the scope inserted.  · If you are awake, you may have a feeling similar to needing to have a bowel movement. You may also feel pressure as air is pumped into the colon. Its OK to pass gas during the procedure.  · Biopsy, polyp removal, or other treatments may be done during the test.  After the test   You may have gas right after the test. It can help to try to pass it to help prevent later bloating. Your healthcare provider may discuss the results with you right away. Or you may need to schedule a follow-up visit to talk about the results. After the test, you can go back to your normal eating and other activities. You may be tired from the sedation and need to rest for a few hours.  Date Last Reviewed: 11/1/2016 © 2000-2017 [employer]. [address] [place] [address], [place] [address]. All rights reserved. This information is not intended as a substitute for professional medical care. Always follow your healthcare professional's instructions.

## 2019-02-25 NOTE — ANESTHESIA RELEASE NOTE
"Anesthesia Release from PACU Note    Patient: Andrei Casanova    Procedure(s) Performed: Procedure(s) (LRB):  COLONOSCOPY (N/A)    Anesthesia type: MAC    Post pain: Adequate analgesia    Post assessment: no apparent anesthetic complications    Last Vitals:   Visit Vitals  BP (!) 116/57   Pulse 72   Temp 36.7 °C (98.1 °F) (Skin)   Resp 16   Ht 5' 10" (1.778 m)   Wt 93.4 kg (206 lb)   SpO2 95%   BMI 29.56 kg/m²       Post vital signs: stable    Level of consciousness: awake    Nausea/Vomiting: no nausea/no vomiting    Complications: none    Airway Patency: patent    Respiratory: unassisted    Cardiovascular: stable and blood pressure at baseline    Hydration: euvolemic  "

## 2019-02-25 NOTE — ANESTHESIA POSTPROCEDURE EVALUATION
"Anesthesia Post Evaluation    Patient: Andrei Casanova    Procedure(s) Performed: Procedure(s) (LRB):  COLONOSCOPY (N/A)    Final Anesthesia Type: MAC  Patient location during evaluation: PACU  Patient participation: Yes- Able to Participate  Level of consciousness: awake and alert  Post-procedure vital signs: reviewed and stable  Pain management: adequate  Airway patency: patent  PONV status at discharge: No PONV  Anesthetic complications: no      Cardiovascular status: blood pressure returned to baseline  Respiratory status: unassisted  Hydration status: euvolemic  Follow-up not needed.        Visit Vitals  BP (!) 116/57   Pulse 72   Temp 36.7 °C (98.1 °F) (Skin)   Resp 16   Ht 5' 10" (1.778 m)   Wt 93.4 kg (206 lb)   SpO2 95%   BMI 29.56 kg/m²       Pain/Cecelia Score: Cecelia Score: 8 (2/25/2019 10:01 AM)        "

## 2019-02-25 NOTE — [employer] PATIENT INSTRUCTIONS
Discharge Summary/Instructions after an Endoscopic Procedure  Patient Name: Andrei Casanova  Patient MRN: [id number]  Patient YOB: 1970 Monday, February 25, 2019 Denis Eller MD  RESTRICTIONS:  During your procedure today, you received medications for sedation.  These   medications may affect your judgment, balance and coordination.  Therefore,   for 24 hours, you have the following restrictions:   - DO NOT drive a car, operate machinery, make legal/financial decisions,   sign important papers or drink alcohol.    ACTIVITY:  Today: no heavy lifting, straining or running due to procedural   sedation/anesthesia.  The following day: return to full activity including work.  DIET:  Eat and drink normally unless instructed otherwise.     TREATMENT FOR COMMON SIDE EFFECTS:  - Mild abdominal pain, nausea, belching, bloating or excessive gas:  rest,   eat lightly and use a heating pad.  - Sore Throat: treat with throat lozenges and/or gargle with warm salt   water.  - Because air was used during the procedure, expelling large amounts of air   from your rectum or belching is normal.  - If a bowel prep was taken, you may not have a bowel movement for 1-3 days.    This is normal.  SYMPTOMS TO WATCH FOR AND REPORT TO YOUR PHYSICIAN:  1. Abdominal pain or bloating, other than gas cramps.  2. Chest pain.  3. Back pain.  4. Signs of infection such as: chills or fever occurring within 24 hours   after the procedure.  5. Rectal bleeding, which would show as bright red, maroon, or black stools.   (A tablespoon of blood from the rectum is not serious, especially if   hemorrhoids are present.)  6. Vomiting.  7. Weakness or dizziness.  GO DIRECTLY TO THE NEAREST EMERGENCY ROOM IF YOU HAVE ANY OF THE FOLLOWING:      Difficulty breathing              Chills and/or fever over 101 F   Persistent vomiting and/or vomiting blood   Severe abdominal pain   Severe chest pain   Black, tarry stools   Bleeding- more than one  tablespoon   Any other symptom or condition that you feel may need urgent attention  Your doctor recommends these additional instructions:  If any biopsies were taken, your doctors clinic will contact you in 1 to 2   weeks with any results.  - Patient has a contact number available for emergencies.  The signs and   symptoms of potential delayed complications were discussed with the   patient.  Return to normal activities tomorrow.  Written discharge   instructions were provided to the patient.   - Clear liquid diet.   - Repeat colonoscopy tomorrow for screening purposes.   - Continue present medications.   - Discharge patient to home (via wheelchair).  For questions, problems or results please call your physician Denis Eller MD at Work:  [phone]  If you have any questions about the above instructions, call the GI   department at [phone] or call the endoscopy unit at [phone]   from 7am until 3 pm.  [facility] [place], EMERGENCY ROOM PHONE NUMBER:   [phone]  IF A COMPLICATION OR EMERGENCY SITUATION ARISES AND YOU ARE UNABLE TO REACH   YOUR PHYSICIAN - GO DIRECTLY TO THE EMERGENCY ROOM.  I have read or have had read to me these discharge instructions for my   procedure and have received a written copy.  I understand these   instructions and will follow-up with my physician if I have any questions.     __________________________________       _____________________________________  Nurse Signature                                          Patient/Designated   Responsible Party Signature  Denis Eller MD  2/25/2019 10:04:52 AM  This report has been verified and signed electronically.  [employer]

## 2019-02-25 NOTE — DISCHARGE SUMMARY
Endoscopy Discharge Summary      Admit Date: 2/25/2019    Discharge Date and Time:  2/25/2019 10:07 AM    Attending Physician: Denis Eller MD     Discharge Physician: Denis Eller MD     Principal Admitting Diagnoses: Colon cancer screening         Discharge Diagnosis: The primary encounter diagnosis was Colon cancer screening. A diagnosis of Special screening for malignant neoplasms, colon was also pertinent to this visit.     Discharged Condition: Good    Indication for Admission: Colon cancer screening     Hospital Course: Patient was admitted for an inpatient procedure and tolerated the procedure well with no complications.    Significant Diagnostic Studies: Colonoscopy    Pathology (if any):  Specimen (12h ago, onward)    None          Estimated Blood Loss: 0 ml.    Discussed with: patient and family.    Disposition: Home.    Follow Up/Patient Instructions:   Current Discharge Medication List      START taking these medications    Details   polyethylene glycol (GOLYTELY,NULYTELY) 236-22.74-6.74 -5.86 gram suspension Drink 1 glass every 15 minutes starting at 5PM the night before the procedure until the bottle is empty.  Qty: 4000 mL, Refills: 0         CONTINUE these medications which have NOT CHANGED    Details   amLODIPine (NORVASC) 5 MG tablet Take 1 tablet (5 mg total) by mouth once daily.  Qty: 90 tablet, Refills: 3      !! dextroamphetamine-amphetamine (ADDERALL XR) 30 MG 24 hr capsule Take 1 capsule (30 mg total) by mouth every morning.  Qty: 30 capsule, Refills: 0      lamoTRIgine (LAMICTAL) 200 MG tablet Take 1 tablet (200 mg total) by mouth once daily.  Qty: 90 tablet, Refills: 3      lisinopril 10 MG tablet Take 1 tablet (10 mg total) by mouth once daily.  Qty: 90 tablet, Refills: 3      NASONEX 50 mcg/actuation nasal spray 2 sprays by Nasal route daily as needed.  Refills: 5      !! dextroamphetamine-amphetamine (ADDERALL XR) 30 MG 24 hr capsule Take 1 capsule (30 mg total) by mouth every  morning.  Qty: 30 capsule, Refills: 0      !! dextroamphetamine-amphetamine (ADDERALL XR) 30 MG 24 hr capsule Take 1 capsule (30 mg total) by mouth every morning.  Qty: 30 capsule, Refills: 0       !! - Potential duplicate medications found. Please discuss with provider.          Discharge Procedure Orders   Diet general     Call MD for:  temperature >100.4     Call MD for:  persistent nausea and vomiting     Call MD for:  severe uncontrolled pain     Call MD for:  difficulty breathing, headache or visual disturbances     Call MD for:  redness, tenderness, or signs of infection (pain, swelling, redness, odor or green/yellow discharge around incision site)     Call MD for:  hives     Call MD for:  persistent dizziness or light-headedness     No dressing needed       Follow-up Information     Denis Eller MD In 1 day.    Specialty:  Family Medicine  Why:  for a repeat colnooscopy with a prep.  Contact information:  [id number] [place] [address] [phone]                       3

## 2019-02-25 NOTE — PROGRESS NOTES
After speaking with him when he awoke, he refused to schedule a repeat colonoscopy tomorrow but agrees to do this in the future.  I have placed orders and send in a golytely prep for him.  He states that he tolerated this in the past.   He also found out from his father by texting him that his father did have colon polyps in the past.  This was not known prior to this procedure.

## 2019-02-25 NOTE — TRANSFER OF CARE
"Anesthesia Transfer of Care Note    Patient: Andrei Casanova    Procedure(s) Performed: Procedure(s) (LRB):  COLONOSCOPY (N/A)    Patient location: PACU    Anesthesia Type: MAC    Transport from OR: Transported from OR on room air with adequate spontaneous ventilation    Post pain: adequate analgesia    Post assessment: no apparent anesthetic complications    Post vital signs: stable    Level of consciousness: awake    Nausea/Vomiting: no nausea/vomiting    Complications: none    Transfer of care protocol was followed      Last vitals:   Visit Vitals  BP (!) 126/91 (BP Location: Left arm, Patient Position: Lying)   Pulse 75   Temp 36.7 °C (98.1 °F) (Skin)   Resp 14   Ht 5' 10" (1.778 m)   Wt 93.4 kg (206 lb)   SpO2 96%   BMI 29.56 kg/m²     "

## 2019-02-25 NOTE — ANESTHESIA PREPROCEDURE EVALUATION
02/25/2019  Andrei Casanova is a 48 y.o., male.    Pre-op Assessment    I have reviewed the Patient Summary Reports.     I have reviewed the Nursing Notes.   I have reviewed the Medications.     Review of Systems  Anesthesia Hx:  No problems with previous Anesthesia  History of prior surgery of interest to airway management or planning: Previous anesthesia: General, MAC Denies Family Hx of Anesthesia complications.   Denies Personal Hx of Anesthesia complications.   Social:  Former Smoker, No Alcohol Use Remote hx of smoking   Hematology/Oncology:  Hematology Normal   Oncology Normal     EENT/Dental:EENT/Dental Normal   Cardiovascular:   Hypertension, well controlled    Pulmonary:  Pulmonary Normal    Hepatic/GI:   Bowel Prep. Colon screening   Musculoskeletal:  Musculoskeletal Normal    Neurological:  Neurology Normal    Endocrine:  Endocrine Normal    Psych:   Psychiatric History Bipolar  ADD / ADHD         Physical Exam  General:  Obesity    Airway/Jaw/Neck:  Airway Findings: Mouth Opening: Normal Tongue: Normal  General Airway Assessment: Adult  Mallampati: II  Improves to II with phonation.  TM Distance: Normal, at least 6 cm       Chest/Lungs:  Chest/Lungs Findings: Normal Respiratory Rate         Mental Status:  Mental Status Findings:  Cooperative         Anesthesia Plan  Type of Anesthesia, risks & benefits discussed:  Anesthesia Type:  MAC  Patient's Preference:   Intra-op Monitoring Plan: standard ASA monitors  Intra-op Monitoring Plan Comments:   Post Op Pain Control Plan:   Post Op Pain Control Plan Comments:   Induction:    Beta Blocker:  Patient is not currently on a Beta-Blocker (No further documentation required).       Informed Consent: Patient understands risks and agrees with Anesthesia plan.  Questions answered. Anesthesia consent signed with patient.  ASA Score: 2     Day of Surgery  Review of History & Physical:  There are no significant changes.

## 2019-03-11 ENCOUNTER — OFFICE VISIT (OUTPATIENT)
Dept: FAMILY MEDICINE | Facility: CLINIC | Age: 49
End: 2019-03-11
Payer: COMMERCIAL

## 2019-03-11 VITALS
DIASTOLIC BLOOD PRESSURE: 80 MMHG | HEIGHT: 70 IN | BODY MASS INDEX: 30.09 KG/M2 | TEMPERATURE: 98 F | WEIGHT: 210.19 LBS | HEART RATE: 90 BPM | SYSTOLIC BLOOD PRESSURE: 120 MMHG

## 2019-03-11 DIAGNOSIS — I10 ESSENTIAL HYPERTENSION: ICD-10-CM

## 2019-03-11 DIAGNOSIS — F98.8 ATTENTION DEFICIT DISORDER, UNSPECIFIED HYPERACTIVITY PRESENCE: Primary | ICD-10-CM

## 2019-03-11 DIAGNOSIS — E78.2 MIXED HYPERLIPIDEMIA: ICD-10-CM

## 2019-03-11 PROCEDURE — 99214 PR OFFICE/OUTPT VISIT, EST, LEVL IV, 30-39 MIN: ICD-10-PCS | Mod: S$GLB,,, | Performed by: FAMILY MEDICINE

## 2019-03-11 PROCEDURE — 99214 OFFICE O/P EST MOD 30 MIN: CPT | Mod: S$GLB,,, | Performed by: FAMILY MEDICINE

## 2019-03-11 PROCEDURE — 99999 PR PBB SHADOW E&M-EST. PATIENT-LVL III: ICD-10-PCS | Mod: PBBFAC,,, | Performed by: FAMILY MEDICINE

## 2019-03-11 PROCEDURE — 99999 PR PBB SHADOW E&M-EST. PATIENT-LVL III: CPT | Mod: PBBFAC,,, | Performed by: FAMILY MEDICINE

## 2019-03-11 RX ORDER — LAMOTRIGINE 200 MG/1
200 TABLET ORAL DAILY
Qty: 90 TABLET | Refills: 3 | Status: SHIPPED | OUTPATIENT
Start: 2019-03-11 | End: 2019-09-09 | Stop reason: SDUPTHER

## 2019-03-11 RX ORDER — DEXTROAMPHETAMINE SACCHARATE, AMPHETAMINE ASPARTATE MONOHYDRATE, DEXTROAMPHETAMINE SULFATE AND AMPHETAMINE SULFATE 7.5; 7.5; 7.5; 7.5 MG/1; MG/1; MG/1; MG/1
30 CAPSULE, EXTENDED RELEASE ORAL EVERY MORNING
Qty: 30 CAPSULE | Refills: 0 | Status: SHIPPED | OUTPATIENT
Start: 2019-04-16 | End: 2019-06-10 | Stop reason: SDUPTHER

## 2019-03-11 RX ORDER — LISINOPRIL 10 MG/1
10 TABLET ORAL DAILY
Qty: 90 TABLET | Refills: 3 | Status: SHIPPED | OUTPATIENT
Start: 2019-03-11 | End: 2019-09-09 | Stop reason: SDUPTHER

## 2019-03-11 RX ORDER — AMLODIPINE BESYLATE 5 MG/1
5 TABLET ORAL DAILY
Qty: 90 TABLET | Refills: 3 | Status: SHIPPED | OUTPATIENT
Start: 2019-03-11 | End: 2019-09-09 | Stop reason: SDUPTHER

## 2019-03-11 RX ORDER — DEXTROAMPHETAMINE SACCHARATE, AMPHETAMINE ASPARTATE MONOHYDRATE, DEXTROAMPHETAMINE SULFATE AND AMPHETAMINE SULFATE 7.5; 7.5; 7.5; 7.5 MG/1; MG/1; MG/1; MG/1
30 CAPSULE, EXTENDED RELEASE ORAL EVERY MORNING
Qty: 30 CAPSULE | Refills: 0 | Status: SHIPPED | OUTPATIENT
Start: 2019-03-16 | End: 2019-06-10 | Stop reason: SDUPTHER

## 2019-03-11 RX ORDER — DEXTROAMPHETAMINE SACCHARATE, AMPHETAMINE ASPARTATE MONOHYDRATE, DEXTROAMPHETAMINE SULFATE AND AMPHETAMINE SULFATE 7.5; 7.5; 7.5; 7.5 MG/1; MG/1; MG/1; MG/1
30 CAPSULE, EXTENDED RELEASE ORAL EVERY MORNING
Qty: 30 CAPSULE | Refills: 0 | Status: SHIPPED | OUTPATIENT
Start: 2019-05-16 | End: 2019-06-10 | Stop reason: SDUPTHER

## 2019-03-11 NOTE — PROGRESS NOTES
Chief Complaint:    Chief Complaint   Patient presents with    Medication Refill       History of Present Illness:    He is here for follow-up:  He is on ADHD medication working good no side effects  Blood pressure is normal at home  Bipolar disorder stable  ROS:  Review of Systems   Constitutional: Negative for activity change, chills, fatigue, fever and unexpected weight change.   HENT: Negative for congestion, ear discharge, ear pain, hearing loss, postnasal drip and rhinorrhea.    Eyes: Negative for pain and visual disturbance.   Respiratory: Negative for cough, chest tightness and shortness of breath.    Cardiovascular: Negative for chest pain and palpitations.   Gastrointestinal: Negative for abdominal pain, diarrhea and vomiting.   Endocrine: Negative for heat intolerance.   Genitourinary: Negative for dysuria, flank pain, frequency and hematuria.   Musculoskeletal: Negative for back pain, gait problem and neck pain.   Skin: Negative for color change and rash.   Neurological: Negative for dizziness, tremors, seizures, numbness and headaches.   Psychiatric/Behavioral: Negative for agitation, hallucinations, self-injury, sleep disturbance and suicidal ideas. The patient is not nervous/anxious.        Past Medical History:   Diagnosis Date    ADHD (attention deficit hyperactivity disorder)     Bipolar disorder     DVT (deep venous thrombosis)     Family history of colonic polyps 2/25/2019    He states that his father had colon polyps.    Hypertension        Social History:  Social History     Socioeconomic History    Marital status:      Spouse name: None    Number of children: None    Years of education: None    Highest education level: None   Social Needs    Financial resource strain: None    Food insecurity - worry: None    Food insecurity - inability: None    Transportation needs - medical: None    Transportation needs - non-medical: None   Occupational History    None   Tobacco Use  "   Smoking status: Former Smoker     Packs/day: 1.00     Years: 15.00     Pack years: 15.00     Last attempt to quit: 2/25/2009     Years since quitting: 10.0    Smokeless tobacco: Never Used   Substance and Sexual Activity    Alcohol use: No    Drug use: No    Sexual activity: Yes     Partners: Female     Birth control/protection: None, Surgical     Comment: Vasectomy   Other Topics Concern    None   Social History Narrative    None       Family History:   family history includes Diabetes in his father; Heart disease in his paternal grandmother; Hypertension in his father and mother.    Health Maintenance   Topic Date Due    TETANUS VACCINE  05/23/1986    Lipid Panel  12/13/2019    Colonoscopy  02/25/2029    Influenza Vaccine  Completed       Physical Exam:    Vital Signs  Temp: 97.9 °F (36.6 °C)  Temp src: Tympanic  Pulse: 90  BP: 120/80  BP Location: Left arm  Patient Position: Sitting  Pain Score: 0-No pain  Height and Weight  Height: 5' 10" (177.8 cm)  Weight: 95.4 kg (210 lb 3.3 oz)  BSA (Calculated - sq m): 2.17 sq meters  BMI (Calculated): 30.2  Weight in (lb) to have BMI = 25: 173.9]    Body mass index is 30.16 kg/m².    Physical Exam   Constitutional: He is oriented to person, place, and time. He appears well-developed.   HENT:   Mouth/Throat: Oropharynx is clear and moist.   Eyes: Conjunctivae are normal. Pupils are equal, round, and reactive to light.   Neck: Normal range of motion. Neck supple.   Cardiovascular: Normal rate, regular rhythm and normal heart sounds.   No murmur heard.  Pulmonary/Chest: Effort normal and breath sounds normal. No respiratory distress. He has no wheezes. He has no rales. He exhibits no tenderness.   Abdominal: Soft. He exhibits no distension and no mass. There is no tenderness. There is no guarding.   Musculoskeletal: He exhibits no edema or tenderness.   Lymphadenopathy:     He has no cervical adenopathy.   Neurological: He is alert and oriented to person, place, " and time. He has normal reflexes.   Skin: Skin is warm and dry.   Psychiatric: He has a normal mood and affect. His behavior is normal. Judgment and thought content normal.       Results for orders placed or performed in visit on 12/13/18   Hemoglobin A1c   Result Value Ref Range    Hemoglobin A1C 5.4 4.0 - 5.6 %    Estimated Avg Glucose 108 68 - 131 mg/dL   Lipid panel   Result Value Ref Range    Cholesterol 198 120 - 199 mg/dL    Triglycerides 97 30 - 150 mg/dL    HDL 58 40 - 75 mg/dL    LDL Cholesterol 120.6 63.0 - 159.0 mg/dL    HDL/Chol Ratio 29.3 20.0 - 50.0 %    Total Cholesterol/HDL Ratio 3.4 2.0 - 5.0    Non-HDL Cholesterol 140 mg/dL   Comprehensive metabolic panel   Result Value Ref Range    Sodium 138 136 - 145 mmol/L    Potassium 4.5 3.5 - 5.1 mmol/L    Chloride 102 95 - 110 mmol/L    CO2 29 23 - 29 mmol/L    Glucose 93 70 - 110 mg/dL    BUN, Bld 10 6 - 20 mg/dL    Creatinine 1.3 0.5 - 1.4 mg/dL    Calcium 10.2 8.7 - 10.5 mg/dL    Total Protein 7.2 6.0 - 8.4 g/dL    Albumin 4.1 3.5 - 5.2 g/dL    Total Bilirubin 0.7 0.1 - 1.0 mg/dL    Alkaline Phosphatase 78 55 - 135 U/L    AST 24 10 - 40 U/L    ALT 26 10 - 44 U/L    Anion Gap 7 (L) 8 - 16 mmol/L    eGFR if African American >60.0 >60 mL/min/1.73 m^2    eGFR if non African American >60.0 >60 mL/min/1.73 m^2   PSA, Screening   Result Value Ref Range    PSA, SCREEN 1.3 0.00 - 4.00 ng/mL         Lab Results   Component Value Date    HGBA1C 5.4 12/13/2018       Assessment:      ICD-10-CM ICD-9-CM   1. Attention deficit disorder, unspecified hyperactivity presence F98.8 314.00   2. Essential hypertension I10 401.9   3. Mixed hyperlipidemia E78.2 272.2         Plan:     Continue current meds and plan.  Medical problems as above stable  Bipolar disorder stable  Please start reschedule colonoscopy but patient says he wants to wait [facility] colonoscopy was inconclusive due to inadequate prep    No orders of the defined types were placed in this encounter.      Current  Outpatient Medications   Medication Sig Dispense Refill    [START ON 5/16/2019] dextroamphetamine-amphetamine (ADDERALL XR) 30 MG 24 hr capsule Take 1 capsule (30 mg total) by mouth every morning. 30 capsule 0    [START ON 4/16/2019] dextroamphetamine-amphetamine (ADDERALL XR) 30 MG 24 hr capsule Take 1 capsule (30 mg total) by mouth every morning. 30 capsule 0    [START ON 3/16/2019] dextroamphetamine-amphetamine (ADDERALL XR) 30 MG 24 hr capsule Take 1 capsule (30 mg total) by mouth every morning. 30 capsule 0    NASONEX 50 mcg/actuation nasal spray 2 sprays by Nasal route daily as needed.  5    amLODIPine (NORVASC) 5 MG tablet Take 1 tablet (5 mg total) by mouth once daily. 90 tablet 3    lamoTRIgine (LAMICTAL) 200 MG tablet Take 1 tablet (200 mg total) by mouth once daily. 90 tablet 3    lisinopril 10 MG tablet Take 1 tablet (10 mg total) by mouth once daily. 90 tablet 3     No current facility-administered medications for this visit.        Medications Discontinued During This Encounter   Medication Reason    polyethylene glycol (GOLYTELY,NULYTELY) 236-22.74-6.74 -5.86 gram suspension Therapy completed    dextroamphetamine-amphetamine (ADDERALL XR) 30 MG 24 hr capsule Reorder    dextroamphetamine-amphetamine (ADDERALL XR) 30 MG 24 hr capsule Reorder    dextroamphetamine-amphetamine (ADDERALL XR) 30 MG 24 hr capsule Reorder    lamoTRIgine (LAMICTAL) 200 MG tablet Reorder    amLODIPine (NORVASC) 5 MG tablet Reorder    lisinopril 10 MG tablet Reorder       No Follow-up on file.      Oscar Rodríguez MD

## 2019-03-12 ENCOUNTER — TELEPHONE (OUTPATIENT)
Dept: ENDOSCOPY | Facility: HOSPITAL | Age: 49
End: 2019-03-12

## 2019-06-10 ENCOUNTER — OFFICE VISIT (OUTPATIENT)
Dept: FAMILY MEDICINE | Facility: CLINIC | Age: 49
End: 2019-06-10
Payer: COMMERCIAL

## 2019-06-10 VITALS
WEIGHT: 210 LBS | OXYGEN SATURATION: 96 % | SYSTOLIC BLOOD PRESSURE: 122 MMHG | TEMPERATURE: 99 F | HEART RATE: 93 BPM | HEIGHT: 70 IN | BODY MASS INDEX: 30.06 KG/M2 | DIASTOLIC BLOOD PRESSURE: 78 MMHG

## 2019-06-10 DIAGNOSIS — F98.8 ATTENTION DEFICIT DISORDER, UNSPECIFIED HYPERACTIVITY PRESENCE: Primary | ICD-10-CM

## 2019-06-10 DIAGNOSIS — F31.78 BIPOLAR DISORDER, IN FULL REMISSION, MOST RECENT EPISODE MIXED: ICD-10-CM

## 2019-06-10 DIAGNOSIS — I10 ESSENTIAL HYPERTENSION: ICD-10-CM

## 2019-06-10 PROCEDURE — 99214 OFFICE O/P EST MOD 30 MIN: CPT | Mod: S$GLB,,, | Performed by: FAMILY MEDICINE

## 2019-06-10 PROCEDURE — 99999 PR PBB SHADOW E&M-EST. PATIENT-LVL III: CPT | Mod: PBBFAC,,, | Performed by: FAMILY MEDICINE

## 2019-06-10 PROCEDURE — 99999 PR PBB SHADOW E&M-EST. PATIENT-LVL III: ICD-10-PCS | Mod: PBBFAC,,, | Performed by: FAMILY MEDICINE

## 2019-06-10 PROCEDURE — 99214 PR OFFICE/OUTPT VISIT, EST, LEVL IV, 30-39 MIN: ICD-10-PCS | Mod: S$GLB,,, | Performed by: FAMILY MEDICINE

## 2019-06-10 RX ORDER — DEXTROAMPHETAMINE SACCHARATE, AMPHETAMINE ASPARTATE MONOHYDRATE, DEXTROAMPHETAMINE SULFATE AND AMPHETAMINE SULFATE 7.5; 7.5; 7.5; 7.5 MG/1; MG/1; MG/1; MG/1
30 CAPSULE, EXTENDED RELEASE ORAL EVERY MORNING
Qty: 30 CAPSULE | Refills: 0 | Status: SHIPPED | OUTPATIENT
Start: 2019-07-16 | End: 2019-09-09 | Stop reason: SDUPTHER

## 2019-06-10 RX ORDER — DEXTROAMPHETAMINE SACCHARATE, AMPHETAMINE ASPARTATE MONOHYDRATE, DEXTROAMPHETAMINE SULFATE AND AMPHETAMINE SULFATE 7.5; 7.5; 7.5; 7.5 MG/1; MG/1; MG/1; MG/1
30 CAPSULE, EXTENDED RELEASE ORAL EVERY MORNING
Qty: 30 CAPSULE | Refills: 0 | Status: SHIPPED | OUTPATIENT
Start: 2019-08-16 | End: 2019-09-09 | Stop reason: SDUPTHER

## 2019-06-10 RX ORDER — DEXTROAMPHETAMINE SACCHARATE, AMPHETAMINE ASPARTATE MONOHYDRATE, DEXTROAMPHETAMINE SULFATE AND AMPHETAMINE SULFATE 7.5; 7.5; 7.5; 7.5 MG/1; MG/1; MG/1; MG/1
30 CAPSULE, EXTENDED RELEASE ORAL EVERY MORNING
Qty: 30 CAPSULE | Refills: 0 | Status: SHIPPED | OUTPATIENT
Start: 2019-06-16 | End: 2019-09-09 | Stop reason: SDUPTHER

## 2019-06-10 NOTE — PROGRESS NOTES
Chief Complaint:    Chief Complaint   Patient presents with    Follow-up       History of Present Illness:    He is here for follow-up:  He is on ADHD medication working good no side effects  Blood pressure is normal at home  Bipolar disorder stable    ROS:  Review of Systems   Constitutional: Negative for activity change, chills, fatigue, fever and unexpected weight change.   HENT: Negative for congestion, ear discharge, ear pain, hearing loss, postnasal drip and rhinorrhea.    Eyes: Negative for pain and visual disturbance.   Respiratory: Negative for cough, chest tightness and shortness of breath.    Cardiovascular: Negative for chest pain and palpitations.   Gastrointestinal: Negative for abdominal pain, diarrhea and vomiting.   Endocrine: Negative for heat intolerance.   Genitourinary: Negative for dysuria, flank pain, frequency and hematuria.   Musculoskeletal: Negative for back pain, gait problem and neck pain.   Skin: Negative for color change and rash.   Neurological: Negative for dizziness, tremors, seizures, numbness and headaches.   Psychiatric/Behavioral: Negative for agitation, hallucinations, self-injury, sleep disturbance and suicidal ideas. The patient is not nervous/anxious.        Past Medical History:   Diagnosis Date    ADHD (attention deficit hyperactivity disorder)     Bipolar disorder     DVT (deep venous thrombosis)     Family history of colonic polyps 2/25/2019    He states that his father had colon polyps.    Hypertension        Social History:  Social History     Socioeconomic History    Marital status:      Spouse name: Not on file    Number of children: Not on file    Years of education: Not on file    Highest education level: Not on file   Occupational History    Not on file   Social Needs    Financial resource strain: Not on file    Food insecurity:     Worry: Not on file     Inability: Not on file    Transportation needs:     Medical: Not on file      "Non-medical: Not on file   Tobacco Use    Smoking status: Former Smoker     Packs/day: 1.00     Years: 15.00     Pack years: 15.00     Last attempt to quit: 2/25/2009     Years since quitting: 10.2    Smokeless tobacco: Never Used   Substance and Sexual Activity    Alcohol use: No    Drug use: No    Sexual activity: Yes     Partners: Female     Birth control/protection: None, Surgical     Comment: Vasectomy   Lifestyle    Physical activity:     Days per week: Not on file     Minutes per session: Not on file    Stress: Not on file   Relationships    Social connections:     Talks on phone: Not on file     Gets together: Not on file     Attends Faith service: Not on file     Active member of club or organization: Not on file     Attends meetings of clubs or organizations: Not on file     Relationship status: Not on file   Other Topics Concern    Not on file   Social History Narrative    Not on file       Family History:   family history includes Diabetes in his father; Heart disease in his paternal grandmother; Hypertension in his father and mother.    Health Maintenance   Topic Date Due    TETANUS VACCINE  06/10/2020 (Originally 5/23/1988)    Influenza Vaccine  08/01/2019    Lipid Panel  12/13/2019    Colonoscopy  02/25/2029       Physical Exam:    Vital Signs  Temp: 98.7 °F (37.1 °C)  Temp src: Oral  Pulse: 93  SpO2: 96 %  BP: 122/78  BP Location: Left arm  Patient Position: Sitting  Pain Score: 0-No pain  Height and Weight  Height: 5' 10" (177.8 cm)  Weight: 95.2 kg (209 lb 15.8 oz)  BSA (Calculated - sq m): 2.17 sq meters  BMI (Calculated): 30.2  Weight in (lb) to have BMI = 25: 173.9]    Body mass index is 30.13 kg/m².    Physical Exam   Constitutional: He is oriented to person, place, and time. He appears well-developed.   HENT:   Mouth/Throat: Oropharynx is clear and moist.   Eyes: Pupils are equal, round, and reactive to light. Conjunctivae are normal.   Neck: Normal range of motion. Neck " supple.   Cardiovascular: Normal rate, regular rhythm and normal heart sounds.   No murmur heard.  Pulmonary/Chest: Effort normal and breath sounds normal. No respiratory distress. He has no wheezes. He has no rales. He exhibits no tenderness.   Abdominal: Soft. He exhibits no distension and no mass. There is no tenderness. There is no guarding.   Musculoskeletal: He exhibits no edema or tenderness.   Lymphadenopathy:     He has no cervical adenopathy.   Neurological: He is alert and oriented to person, place, and time. He has normal reflexes.   Skin: Skin is warm and dry.   Psychiatric: He has a normal mood and affect. His behavior is normal. Judgment and thought content normal.       Results for orders placed or performed in visit on 12/13/18   Hemoglobin A1c   Result Value Ref Range    Hemoglobin A1C 5.4 4.0 - 5.6 %    Estimated Avg Glucose 108 68 - 131 mg/dL   Lipid panel   Result Value Ref Range    Cholesterol 198 120 - 199 mg/dL    Triglycerides 97 30 - 150 mg/dL    HDL 58 40 - 75 mg/dL    LDL Cholesterol 120.6 63.0 - 159.0 mg/dL    Hdl/Cholesterol Ratio 29.3 20.0 - 50.0 %    Total Cholesterol/HDL Ratio 3.4 2.0 - 5.0    Non-HDL Cholesterol 140 mg/dL   Comprehensive metabolic panel   Result Value Ref Range    Sodium 138 136 - 145 mmol/L    Potassium 4.5 3.5 - 5.1 mmol/L    Chloride 102 95 - 110 mmol/L    CO2 29 23 - 29 mmol/L    Glucose 93 70 - 110 mg/dL    BUN, Bld 10 6 - 20 mg/dL    Creatinine 1.3 0.5 - 1.4 mg/dL    Calcium 10.2 8.7 - 10.5 mg/dL    Total Protein 7.2 6.0 - 8.4 g/dL    Albumin 4.1 3.5 - 5.2 g/dL    Total Bilirubin 0.7 0.1 - 1.0 mg/dL    Alkaline Phosphatase 78 55 - 135 U/L    AST 24 10 - 40 U/L    ALT 26 10 - 44 U/L    Anion Gap 7 (L) 8 - 16 mmol/L    eGFR if African American >60.0 >60 mL/min/1.73 m^2    eGFR if non African American >60.0 >60 mL/min/1.73 m^2   PSA, Screening   Result Value Ref Range    PSA, SCREEN 1.3 0.00 - 4.00 ng/mL         Lab Results   Component Value Date    HGBA1C 5.4  12/13/2018       Assessment:      ICD-10-CM ICD-9-CM   1. Attention deficit disorder, unspecified hyperactivity presence F98.8 314.00   2. Essential hypertension I10 401.9   3. Bipolar disorder, in full remission, most recent episode mixed F31.78 296.66         Plan:     Continue current meds and plan.  Medical problems as above stable  Bipolar disorder stable  Offered Tdap, he refused.    No orders of the defined types were placed in this encounter.      Current Outpatient Medications   Medication Sig Dispense Refill    amLODIPine (NORVASC) 5 MG tablet Take 1 tablet (5 mg total) by mouth once daily. 90 tablet 3    [START ON 8/16/2019] dextroamphetamine-amphetamine (ADDERALL XR) 30 MG 24 hr capsule Take 1 capsule (30 mg total) by mouth every morning. 30 capsule 0    [START ON 7/16/2019] dextroamphetamine-amphetamine (ADDERALL XR) 30 MG 24 hr capsule Take 1 capsule (30 mg total) by mouth every morning. 30 capsule 0    [START ON 6/16/2019] dextroamphetamine-amphetamine (ADDERALL XR) 30 MG 24 hr capsule Take 1 capsule (30 mg total) by mouth every morning. 30 capsule 0    lamoTRIgine (LAMICTAL) 200 MG tablet Take 1 tablet (200 mg total) by mouth once daily. 90 tablet 3    lisinopril 10 MG tablet Take 1 tablet (10 mg total) by mouth once daily. 90 tablet 3     No current facility-administered medications for this visit.        Medications Discontinued During This Encounter   Medication Reason    NASONEX 50 mcg/actuation nasal spray Patient no longer taking    dextroamphetamine-amphetamine (ADDERALL XR) 30 MG 24 hr capsule Reorder    dextroamphetamine-amphetamine (ADDERALL XR) 30 MG 24 hr capsule Reorder    dextroamphetamine-amphetamine (ADDERALL XR) 30 MG 24 hr capsule Reorder       No follow-ups on file.      Oscar Rodríguez MD

## 2019-09-09 ENCOUNTER — OFFICE VISIT (OUTPATIENT)
Dept: FAMILY MEDICINE | Facility: CLINIC | Age: 49
End: 2019-09-09
Payer: COMMERCIAL

## 2019-09-09 VITALS
BODY MASS INDEX: 30.21 KG/M2 | TEMPERATURE: 98 F | DIASTOLIC BLOOD PRESSURE: 88 MMHG | HEIGHT: 70 IN | OXYGEN SATURATION: 95 % | SYSTOLIC BLOOD PRESSURE: 124 MMHG | WEIGHT: 211 LBS | HEART RATE: 97 BPM

## 2019-09-09 DIAGNOSIS — F98.8 ATTENTION DEFICIT DISORDER, UNSPECIFIED HYPERACTIVITY PRESENCE: ICD-10-CM

## 2019-09-09 DIAGNOSIS — F10.11 ALCOHOL ABUSE, IN REMISSION: ICD-10-CM

## 2019-09-09 DIAGNOSIS — I10 ESSENTIAL HYPERTENSION: Primary | ICD-10-CM

## 2019-09-09 DIAGNOSIS — E78.2 MIXED HYPERLIPIDEMIA: ICD-10-CM

## 2019-09-09 PROCEDURE — 99214 OFFICE O/P EST MOD 30 MIN: CPT | Mod: S$GLB,,, | Performed by: FAMILY MEDICINE

## 2019-09-09 PROCEDURE — 99999 PR PBB SHADOW E&M-EST. PATIENT-LVL III: CPT | Mod: PBBFAC,,, | Performed by: FAMILY MEDICINE

## 2019-09-09 PROCEDURE — 99214 PR OFFICE/OUTPT VISIT, EST, LEVL IV, 30-39 MIN: ICD-10-PCS | Mod: S$GLB,,, | Performed by: FAMILY MEDICINE

## 2019-09-09 PROCEDURE — 99999 PR PBB SHADOW E&M-EST. PATIENT-LVL III: ICD-10-PCS | Mod: PBBFAC,,, | Performed by: FAMILY MEDICINE

## 2019-09-09 RX ORDER — MOMETASONE FUROATE 50 UG/1
2 SPRAY, METERED NASAL DAILY
Refills: 5 | COMMUNITY
Start: 2019-07-16 | End: 2021-04-19

## 2019-09-09 RX ORDER — DEXTROAMPHETAMINE SACCHARATE, AMPHETAMINE ASPARTATE MONOHYDRATE, DEXTROAMPHETAMINE SULFATE AND AMPHETAMINE SULFATE 7.5; 7.5; 7.5; 7.5 MG/1; MG/1; MG/1; MG/1
30 CAPSULE, EXTENDED RELEASE ORAL EVERY MORNING
Qty: 30 CAPSULE | Refills: 0 | Status: SHIPPED | OUTPATIENT
Start: 2019-11-16 | End: 2019-12-16 | Stop reason: SDUPTHER

## 2019-09-09 RX ORDER — DEXTROAMPHETAMINE SACCHARATE, AMPHETAMINE ASPARTATE MONOHYDRATE, DEXTROAMPHETAMINE SULFATE AND AMPHETAMINE SULFATE 7.5; 7.5; 7.5; 7.5 MG/1; MG/1; MG/1; MG/1
30 CAPSULE, EXTENDED RELEASE ORAL EVERY MORNING
Qty: 30 CAPSULE | Refills: 0 | Status: SHIPPED | OUTPATIENT
Start: 2019-09-16 | End: 2019-12-16 | Stop reason: SDUPTHER

## 2019-09-09 RX ORDER — LISINOPRIL 10 MG/1
10 TABLET ORAL DAILY
Qty: 90 TABLET | Refills: 3 | Status: SHIPPED | OUTPATIENT
Start: 2019-09-09 | End: 2020-06-11 | Stop reason: SDUPTHER

## 2019-09-09 RX ORDER — AMLODIPINE BESYLATE 5 MG/1
5 TABLET ORAL DAILY
Qty: 90 TABLET | Refills: 3 | Status: SHIPPED | OUTPATIENT
Start: 2019-09-09 | End: 2020-06-11 | Stop reason: SDUPTHER

## 2019-09-09 RX ORDER — LAMOTRIGINE 200 MG/1
200 TABLET ORAL DAILY
Qty: 90 TABLET | Refills: 3 | Status: SHIPPED | OUTPATIENT
Start: 2019-09-09 | End: 2020-06-11 | Stop reason: SDUPTHER

## 2019-09-09 RX ORDER — DEXTROAMPHETAMINE SACCHARATE, AMPHETAMINE ASPARTATE MONOHYDRATE, DEXTROAMPHETAMINE SULFATE AND AMPHETAMINE SULFATE 7.5; 7.5; 7.5; 7.5 MG/1; MG/1; MG/1; MG/1
30 CAPSULE, EXTENDED RELEASE ORAL EVERY MORNING
Qty: 30 CAPSULE | Refills: 0 | Status: SHIPPED | OUTPATIENT
Start: 2019-10-16 | End: 2019-12-16 | Stop reason: SDUPTHER

## 2019-09-09 NOTE — PROGRESS NOTES
Chief Complaint:    Chief Complaint   Patient presents with    Follow-up       History of Present Illness:    He is here for follow-up:  He is on ADHD medication working good no side effects  Blood pressure was elevated in the office   Bipolar disorder stable    ROS:  Review of Systems   Constitutional: Negative for activity change, chills, fatigue, fever and unexpected weight change.   HENT: Negative for congestion, ear discharge, ear pain, hearing loss, postnasal drip and rhinorrhea.    Eyes: Negative for pain and visual disturbance.   Respiratory: Negative for cough, chest tightness and shortness of breath.    Cardiovascular: Negative for chest pain and palpitations.   Gastrointestinal: Negative for abdominal pain, diarrhea and vomiting.   Endocrine: Negative for heat intolerance.   Genitourinary: Negative for dysuria, flank pain, frequency and hematuria.   Musculoskeletal: Negative for back pain, gait problem and neck pain.   Skin: Negative for color change and rash.   Neurological: Negative for dizziness, tremors, seizures, numbness and headaches.   Psychiatric/Behavioral: Negative for agitation, hallucinations, self-injury, sleep disturbance and suicidal ideas. The patient is not nervous/anxious.        Past Medical History:   Diagnosis Date    ADHD (attention deficit hyperactivity disorder)     Bipolar disorder     DVT (deep venous thrombosis)     Family history of colonic polyps 2/25/2019    He states that his father had colon polyps.    Hypertension        Social History:  Social History     Socioeconomic History    Marital status:      Spouse name: Not on file    Number of children: Not on file    Years of education: Not on file    Highest education level: Not on file   Occupational History    Not on file   Social Needs    Financial resource strain: Not on file    Food insecurity:     Worry: Not on file     Inability: Not on file    Transportation needs:     Medical: Not on file      "Non-medical: Not on file   Tobacco Use    Smoking status: Former Smoker     Packs/day: 1.00     Years: 15.00     Pack years: 15.00     Last attempt to quit: 2/25/2009     Years since quitting: 10.5    Smokeless tobacco: Never Used   Substance and Sexual Activity    Alcohol use: No    Drug use: No    Sexual activity: Yes     Partners: Female     Birth control/protection: None, Surgical     Comment: Vasectomy   Lifestyle    Physical activity:     Days per week: Not on file     Minutes per session: Not on file    Stress: Not on file   Relationships    Social connections:     Talks on phone: Not on file     Gets together: Not on file     Attends Mandaen service: Not on file     Active member of club or organization: Not on file     Attends meetings of clubs or organizations: Not on file     Relationship status: Not on file   Other Topics Concern    Not on file   Social History Narrative    Not on file       Family History:   family history includes Diabetes in his father; Heart disease in his paternal grandmother; Hypertension in his father and mother.    Health Maintenance   Topic Date Due    TETANUS VACCINE  06/10/2020 (Originally 5/23/1988)    Lipid Panel  12/13/2019    Colonoscopy  02/25/2029       Physical Exam:    Vital Signs  Temp: 98.1 °F (36.7 °C)  Temp src: Tympanic  Pulse: 97  SpO2: 95 %  BP: 124/88  BP Location: Left arm  Patient Position: Sitting  Pain Score: 0-No pain  Height and Weight  Height: 5' 10" (177.8 cm)  Weight: 95.7 kg (210 lb 15.7 oz)  BSA (Calculated - sq m): 2.17 sq meters  BMI (Calculated): 30.3  Weight in (lb) to have BMI = 25: 173.9]    Body mass index is 30.27 kg/m².    Physical Exam   Constitutional: He is oriented to person, place, and time. He appears well-developed.   HENT:   Mouth/Throat: Oropharynx is clear and moist.   Eyes: Pupils are equal, round, and reactive to light. Conjunctivae are normal.   Neck: Normal range of motion. Neck supple.   Cardiovascular: Normal " rate, regular rhythm and normal heart sounds.   No murmur heard.  Pulmonary/Chest: Effort normal and breath sounds normal. No respiratory distress. He has no wheezes. He has no rales. He exhibits no tenderness.   Abdominal: Soft. He exhibits no distension and no mass. There is no tenderness. There is no guarding.   Musculoskeletal: He exhibits no edema or tenderness.   Lymphadenopathy:     He has no cervical adenopathy.   Neurological: He is alert and oriented to person, place, and time. He has normal reflexes.   Skin: Skin is warm and dry.   Psychiatric: He has a normal mood and affect. His behavior is normal. Judgment and thought content normal.       Results for orders placed or performed in visit on 12/13/18   Hemoglobin A1c   Result Value Ref Range    Hemoglobin A1C 5.4 4.0 - 5.6 %    Estimated Avg Glucose 108 68 - 131 mg/dL   Lipid panel   Result Value Ref Range    Cholesterol 198 120 - 199 mg/dL    Triglycerides 97 30 - 150 mg/dL    HDL 58 40 - 75 mg/dL    LDL Cholesterol 120.6 63.0 - 159.0 mg/dL    Hdl/Cholesterol Ratio 29.3 20.0 - 50.0 %    Total Cholesterol/HDL Ratio 3.4 2.0 - 5.0    Non-HDL Cholesterol 140 mg/dL   Comprehensive metabolic panel   Result Value Ref Range    Sodium 138 136 - 145 mmol/L    Potassium 4.5 3.5 - 5.1 mmol/L    Chloride 102 95 - 110 mmol/L    CO2 29 23 - 29 mmol/L    Glucose 93 70 - 110 mg/dL    BUN, Bld 10 6 - 20 mg/dL    Creatinine 1.3 0.5 - 1.4 mg/dL    Calcium 10.2 8.7 - 10.5 mg/dL    Total Protein 7.2 6.0 - 8.4 g/dL    Albumin 4.1 3.5 - 5.2 g/dL    Total Bilirubin 0.7 0.1 - 1.0 mg/dL    Alkaline Phosphatase 78 55 - 135 U/L    AST 24 10 - 40 U/L    ALT 26 10 - 44 U/L    Anion Gap 7 (L) 8 - 16 mmol/L    eGFR if African American >60.0 >60 mL/min/1.73 m^2    eGFR if non African American >60.0 >60 mL/min/1.73 m^2   PSA, Screening   Result Value Ref Range    PSA, SCREEN 1.3 0.00 - 4.00 ng/mL         Lab Results   Component Value Date    HGBA1C 5.4 12/13/2018       Assessment:       ICD-10-CM ICD-9-CM   1. Essential hypertension I10 401.9   2. Attention deficit disorder, unspecified hyperactivity presence F98.8 314.00   3. Mixed hyperlipidemia E78.2 272.2   4. Alcohol abuse, in remission F10.11 305.03         Plan:     Continue current meds and plan.  Medical problems as above stable  Bipolar disorder stable  Please monitor home blood pressure readings and send us the numbers.      Current Outpatient Medications   Medication Sig Dispense Refill    amLODIPine (NORVASC) 5 MG tablet Take 1 tablet (5 mg total) by mouth once daily. 90 tablet 3    [START ON 11/16/2019] dextroamphetamine-amphetamine (ADDERALL XR) 30 MG 24 hr capsule Take 1 capsule (30 mg total) by mouth every morning. 30 capsule 0    [START ON 10/16/2019] dextroamphetamine-amphetamine (ADDERALL XR) 30 MG 24 hr capsule Take 1 capsule (30 mg total) by mouth every morning. 30 capsule 0    [START ON 9/16/2019] dextroamphetamine-amphetamine (ADDERALL XR) 30 MG 24 hr capsule Take 1 capsule (30 mg total) by mouth every morning. 30 capsule 0    lamoTRIgine (LAMICTAL) 200 MG tablet Take 1 tablet (200 mg total) by mouth once daily. 90 tablet 3    lisinopril 10 MG tablet Take 1 tablet (10 mg total) by mouth once daily. 90 tablet 3    mometasone (NASONEX) 50 mcg/actuation nasal spray 2 sprays by Nasal route once daily.  5     No current facility-administered medications for this visit.        Medications Discontinued During This Encounter   Medication Reason    dextroamphetamine-amphetamine (ADDERALL XR) 30 MG 24 hr capsule Reorder    dextroamphetamine-amphetamine (ADDERALL XR) 30 MG 24 hr capsule Reorder    dextroamphetamine-amphetamine (ADDERALL XR) 30 MG 24 hr capsule Reorder    lamoTRIgine (LAMICTAL) 200 MG tablet Reorder    lisinopril 10 MG tablet Reorder    amLODIPine (NORVASC) 5 MG tablet Reorder       No follow-ups on file.      Oscar Rodríguez MD

## 2019-09-16 ENCOUNTER — PATIENT MESSAGE (OUTPATIENT)
Dept: FAMILY MEDICINE | Facility: CLINIC | Age: 49
End: 2019-09-16

## 2019-12-16 ENCOUNTER — OFFICE VISIT (OUTPATIENT)
Dept: FAMILY MEDICINE | Facility: CLINIC | Age: 49
End: 2019-12-16
Payer: COMMERCIAL

## 2019-12-16 VITALS
HEART RATE: 86 BPM | OXYGEN SATURATION: 95 % | DIASTOLIC BLOOD PRESSURE: 78 MMHG | WEIGHT: 210.19 LBS | HEIGHT: 70 IN | TEMPERATURE: 98 F | SYSTOLIC BLOOD PRESSURE: 102 MMHG | BODY MASS INDEX: 30.09 KG/M2

## 2019-12-16 DIAGNOSIS — F10.11 ALCOHOL ABUSE, IN REMISSION: ICD-10-CM

## 2019-12-16 DIAGNOSIS — I10 ESSENTIAL HYPERTENSION: ICD-10-CM

## 2019-12-16 DIAGNOSIS — F31.78 BIPOLAR DISORDER, IN FULL REMISSION, MOST RECENT EPISODE MIXED: ICD-10-CM

## 2019-12-16 DIAGNOSIS — F98.8 ATTENTION DEFICIT DISORDER, UNSPECIFIED HYPERACTIVITY PRESENCE: Primary | ICD-10-CM

## 2019-12-16 PROCEDURE — 99214 OFFICE O/P EST MOD 30 MIN: CPT | Mod: S$GLB,,, | Performed by: FAMILY MEDICINE

## 2019-12-16 PROCEDURE — 99999 PR PBB SHADOW E&M-EST. PATIENT-LVL III: CPT | Mod: PBBFAC,,, | Performed by: FAMILY MEDICINE

## 2019-12-16 PROCEDURE — 99999 PR PBB SHADOW E&M-EST. PATIENT-LVL III: ICD-10-PCS | Mod: PBBFAC,,, | Performed by: FAMILY MEDICINE

## 2019-12-16 PROCEDURE — 99214 PR OFFICE/OUTPT VISIT, EST, LEVL IV, 30-39 MIN: ICD-10-PCS | Mod: S$GLB,,, | Performed by: FAMILY MEDICINE

## 2019-12-16 RX ORDER — DEXTROAMPHETAMINE SACCHARATE, AMPHETAMINE ASPARTATE MONOHYDRATE, DEXTROAMPHETAMINE SULFATE AND AMPHETAMINE SULFATE 7.5; 7.5; 7.5; 7.5 MG/1; MG/1; MG/1; MG/1
30 CAPSULE, EXTENDED RELEASE ORAL EVERY MORNING
Qty: 30 CAPSULE | Refills: 0 | Status: SHIPPED | OUTPATIENT
Start: 2020-01-16 | End: 2020-03-16 | Stop reason: SDUPTHER

## 2019-12-16 RX ORDER — DEXTROAMPHETAMINE SACCHARATE, AMPHETAMINE ASPARTATE MONOHYDRATE, DEXTROAMPHETAMINE SULFATE AND AMPHETAMINE SULFATE 7.5; 7.5; 7.5; 7.5 MG/1; MG/1; MG/1; MG/1
30 CAPSULE, EXTENDED RELEASE ORAL EVERY MORNING
Qty: 30 CAPSULE | Refills: 0 | Status: SHIPPED | OUTPATIENT
Start: 2020-02-16 | End: 2020-03-16 | Stop reason: SDUPTHER

## 2019-12-16 RX ORDER — DEXTROAMPHETAMINE SACCHARATE, AMPHETAMINE ASPARTATE MONOHYDRATE, DEXTROAMPHETAMINE SULFATE AND AMPHETAMINE SULFATE 7.5; 7.5; 7.5; 7.5 MG/1; MG/1; MG/1; MG/1
30 CAPSULE, EXTENDED RELEASE ORAL EVERY MORNING
Qty: 30 CAPSULE | Refills: 0 | Status: SHIPPED | OUTPATIENT
Start: 2019-12-16 | End: 2019-12-17 | Stop reason: SDUPTHER

## 2019-12-16 NOTE — PROGRESS NOTES
Chief Complaint:    Chief Complaint   Patient presents with    Follow-up       History of Present Illness:    He is here for follow-up:  He is on ADHD medication working good no side effects  Blood pressure was elevated in the office   Bipolar disorder stable  He says he is not drinking alcohol come on truly is going to alcohol anonymous    ROS:  Review of Systems   Constitutional: Negative for activity change, chills, fatigue, fever and unexpected weight change.   HENT: Negative for congestion, ear discharge, ear pain, hearing loss, postnasal drip and rhinorrhea.    Eyes: Negative for pain and visual disturbance.   Respiratory: Negative for cough, chest tightness and shortness of breath.    Cardiovascular: Negative for chest pain and palpitations.   Gastrointestinal: Negative for abdominal pain, diarrhea and vomiting.   Endocrine: Negative for heat intolerance.   Genitourinary: Negative for dysuria, flank pain, frequency and hematuria.   Musculoskeletal: Negative for back pain, gait problem and neck pain.   Skin: Negative for color change and rash.   Neurological: Negative for dizziness, tremors, seizures, numbness and headaches.   Psychiatric/Behavioral: Negative for agitation, hallucinations, self-injury, sleep disturbance and suicidal ideas. The patient is not nervous/anxious.        Past Medical History:   Diagnosis Date    ADHD (attention deficit hyperactivity disorder)     Bipolar disorder     DVT (deep venous thrombosis)     Family history of colonic polyps 2/25/2019    He states that his father had colon polyps.    Hypertension        Social History:  Social History     Socioeconomic History    Marital status:      Spouse name: Not on file    Number of children: Not on file    Years of education: Not on file    Highest education level: Not on file   Occupational History    Not on file   Social Needs    Financial resource strain: Not on file    Food insecurity:     Worry: Not on file     " Inability: Not on file    Transportation needs:     Medical: Not on file     Non-medical: Not on file   Tobacco Use    Smoking status: Former Smoker     Packs/day: 1.00     Years: 15.00     Pack years: 15.00     Last attempt to quit: 2/25/2009     Years since quitting: 10.8    Smokeless tobacco: Never Used   Substance and Sexual Activity    Alcohol use: No    Drug use: No    Sexual activity: Yes     Partners: Female     Birth control/protection: None, Surgical     Comment: Vasectomy   Lifestyle    Physical activity:     Days per week: Not on file     Minutes per session: Not on file    Stress: Not on file   Relationships    Social connections:     Talks on phone: Not on file     Gets together: Not on file     Attends Anglican service: Not on file     Active member of club or organization: Not on file     Attends meetings of clubs or organizations: Not on file     Relationship status: Not on file   Other Topics Concern    Not on file   Social History Narrative    Not on file       Family History:   family history includes Diabetes in his father; Heart disease in his paternal grandmother; Hypertension in his father and mother.    Health Maintenance   Topic Date Due    Lipid Panel  12/13/2019    TETANUS VACCINE  06/10/2020 (Originally 5/23/1988)    Colonoscopy  02/25/2029       Physical Exam:    Vital Signs  Temp: 97.7 °F (36.5 °C)  Temp src: Temporal  Pulse: 86  SpO2: 95 %  BP: 102/78  BP Location: Left arm  Patient Position: Sitting  Pain Score: 0-No pain  Height and Weight  Height: 5' 10" (177.8 cm)  Weight: 95.4 kg (210 lb 3.3 oz)  BSA (Calculated - sq m): 2.17 sq meters  BMI (Calculated): 30.2  Weight in (lb) to have BMI = 25: 173.9]    Body mass index is 30.16 kg/m².    Physical Exam   Constitutional: He is oriented to person, place, and time. He appears well-developed.   HENT:   Mouth/Throat: Oropharynx is clear and moist.   Eyes: Pupils are equal, round, and reactive to light. Conjunctivae are " normal.   Neck: Normal range of motion. Neck supple.   Cardiovascular: Normal rate, regular rhythm and normal heart sounds.   No murmur heard.  Pulmonary/Chest: Effort normal and breath sounds normal. No respiratory distress. He has no wheezes. He has no rales. He exhibits no tenderness.   Abdominal: Soft. He exhibits no distension and no mass. There is no tenderness. There is no guarding.   Musculoskeletal: He exhibits no edema or tenderness.   Lymphadenopathy:     He has no cervical adenopathy.   Neurological: He is alert and oriented to person, place, and time. He has normal reflexes.   Skin: Skin is warm and dry.   Psychiatric: He has a normal mood and affect. His behavior is normal. Judgment and thought content normal.       Results for orders placed or performed in visit on 12/13/18   Hemoglobin A1c   Result Value Ref Range    Hemoglobin A1C 5.4 4.0 - 5.6 %    Estimated Avg Glucose 108 68 - 131 mg/dL   Lipid panel   Result Value Ref Range    Cholesterol 198 120 - 199 mg/dL    Triglycerides 97 30 - 150 mg/dL    HDL 58 40 - 75 mg/dL    LDL Cholesterol 120.6 63.0 - 159.0 mg/dL    Hdl/Cholesterol Ratio 29.3 20.0 - 50.0 %    Total Cholesterol/HDL Ratio 3.4 2.0 - 5.0    Non-HDL Cholesterol 140 mg/dL   Comprehensive metabolic panel   Result Value Ref Range    Sodium 138 136 - 145 mmol/L    Potassium 4.5 3.5 - 5.1 mmol/L    Chloride 102 95 - 110 mmol/L    CO2 29 23 - 29 mmol/L    Glucose 93 70 - 110 mg/dL    BUN, Bld 10 6 - 20 mg/dL    Creatinine 1.3 0.5 - 1.4 mg/dL    Calcium 10.2 8.7 - 10.5 mg/dL    Total Protein 7.2 6.0 - 8.4 g/dL    Albumin 4.1 3.5 - 5.2 g/dL    Total Bilirubin 0.7 0.1 - 1.0 mg/dL    Alkaline Phosphatase 78 55 - 135 U/L    AST 24 10 - 40 U/L    ALT 26 10 - 44 U/L    Anion Gap 7 (L) 8 - 16 mmol/L    eGFR if African American >60.0 >60 mL/min/1.73 m^2    eGFR if non African American >60.0 >60 mL/min/1.73 m^2   PSA, Screening   Result Value Ref Range    PSA, SCREEN 1.3 0.00 - 4.00 ng/mL         Lab  Results   Component Value Date    HGBA1C 5.4 12/13/2018       Assessment:      ICD-10-CM ICD-9-CM   1. Attention deficit disorder, unspecified hyperactivity presence F98.8 314.00   2. Alcohol abuse, in remission F10.11 305.03   3. Bipolar disorder, in full remission, most recent episode mixed F31.78 296.66   4. Essential hypertension I10 401.9         Plan:     Continue current meds and plan.  Medical problems as above stable  Bipolar disorder stable   looked up 3 prescriptions for medicines at    Current Outpatient Medications   Medication Sig Dispense Refill    [START ON 2/16/2020] dextroamphetamine-amphetamine (ADDERALL XR) 30 MG 24 hr capsule Take 1 capsule (30 mg total) by mouth every morning. 30 capsule 0    [START ON 1/16/2020] dextroamphetamine-amphetamine (ADDERALL XR) 30 MG 24 hr capsule Take 1 capsule (30 mg total) by mouth every morning. 30 capsule 0    dextroamphetamine-amphetamine (ADDERALL XR) 30 MG 24 hr capsule Take 1 capsule (30 mg total) by mouth every morning. 30 capsule 0    lamoTRIgine (LAMICTAL) 200 MG tablet Take 1 tablet (200 mg total) by mouth once daily. 90 tablet 3    lisinopril 10 MG tablet Take 1 tablet (10 mg total) by mouth once daily. 90 tablet 3    mometasone (NASONEX) 50 mcg/actuation nasal spray 2 sprays by Nasal route once daily.  5    amLODIPine (NORVASC) 5 MG tablet Take 1 tablet (5 mg total) by mouth once daily. 90 tablet 3     No current facility-administered medications for this visit.        Medications Discontinued During This Encounter   Medication Reason    dextroamphetamine-amphetamine (ADDERALL XR) 30 MG 24 hr capsule Reorder    dextroamphetamine-amphetamine (ADDERALL XR) 30 MG 24 hr capsule Reorder    dextroamphetamine-amphetamine (ADDERALL XR) 30 MG 24 hr capsule Reorder       No follow-ups on file.      Oscar Rodríguez MD

## 2019-12-17 ENCOUNTER — PATIENT MESSAGE (OUTPATIENT)
Dept: FAMILY MEDICINE | Facility: CLINIC | Age: 49
End: 2019-12-17

## 2019-12-17 ENCOUNTER — TELEPHONE (OUTPATIENT)
Dept: FAMILY MEDICINE | Facility: CLINIC | Age: 49
End: 2019-12-17

## 2019-12-17 RX ORDER — DEXTROAMPHETAMINE SACCHARATE, AMPHETAMINE ASPARTATE MONOHYDRATE, DEXTROAMPHETAMINE SULFATE AND AMPHETAMINE SULFATE 7.5; 7.5; 7.5; 7.5 MG/1; MG/1; MG/1; MG/1
30 CAPSULE, EXTENDED RELEASE ORAL EVERY MORNING
Qty: 30 CAPSULE | Refills: 0 | Status: SHIPPED | OUTPATIENT
Start: 2019-12-17 | End: 2020-03-16 | Stop reason: SDUPTHER

## 2019-12-17 NOTE — TELEPHONE ENCOUNTER
----- Message from Frandy Naveed sent at 12/17/2019  1:57 PM CST -----  Contact: Mell( [facility]   ..Type:  Pharmacy Calling to Clarify an RX    Name of Caller Mell   Pharmacy Name: [facility]   Prescription Namedextroamphetamine-amphetamine (ADDERALL XR) 30 MG 24 hr capsule  What do they need to clarify?: pt insurance will not cover brand name until beginning of year.   Best Call Back Number:[phone]  Additional Information:              [id number] - [place], [place] - [address] [facility] [address]  [address]  [facility] [address]  Phone: [phone] Fax: [phone]

## 2020-01-16 ENCOUNTER — PATIENT MESSAGE (OUTPATIENT)
Dept: FAMILY MEDICINE | Facility: CLINIC | Age: 50
End: 2020-01-16

## 2020-01-17 ENCOUNTER — DOCUMENTATION ONLY (OUTPATIENT)
Dept: FAMILY MEDICINE | Facility: CLINIC | Age: 50
End: 2020-01-17

## 2020-03-16 ENCOUNTER — OFFICE VISIT (OUTPATIENT)
Dept: FAMILY MEDICINE | Facility: CLINIC | Age: 50
End: 2020-03-16
Payer: COMMERCIAL

## 2020-03-16 DIAGNOSIS — I10 ESSENTIAL HYPERTENSION: ICD-10-CM

## 2020-03-16 DIAGNOSIS — F41.9 ANXIETY AND DEPRESSION: Primary | ICD-10-CM

## 2020-03-16 DIAGNOSIS — F98.8 ATTENTION DEFICIT DISORDER, UNSPECIFIED HYPERACTIVITY PRESENCE: ICD-10-CM

## 2020-03-16 DIAGNOSIS — F32.A ANXIETY AND DEPRESSION: Primary | ICD-10-CM

## 2020-03-16 DIAGNOSIS — G47.00 INSOMNIA, UNSPECIFIED TYPE: ICD-10-CM

## 2020-03-16 PROCEDURE — 99214 OFFICE O/P EST MOD 30 MIN: CPT | Mod: 95,,, | Performed by: FAMILY MEDICINE

## 2020-03-16 PROCEDURE — 99214 PR OFFICE/OUTPT VISIT, EST, LEVL IV, 30-39 MIN: ICD-10-PCS | Mod: 95,,, | Performed by: FAMILY MEDICINE

## 2020-03-16 RX ORDER — VENLAFAXINE HYDROCHLORIDE 37.5 MG/1
37.5 CAPSULE, EXTENDED RELEASE ORAL DAILY
Qty: 30 CAPSULE | Refills: 11 | Status: SHIPPED | OUTPATIENT
Start: 2020-03-16 | End: 2020-04-08 | Stop reason: DRUGHIGH

## 2020-03-16 RX ORDER — DEXTROAMPHETAMINE SACCHARATE, AMPHETAMINE ASPARTATE MONOHYDRATE, DEXTROAMPHETAMINE SULFATE AND AMPHETAMINE SULFATE 7.5; 7.5; 7.5; 7.5 MG/1; MG/1; MG/1; MG/1
30 CAPSULE, EXTENDED RELEASE ORAL EVERY MORNING
Qty: 30 CAPSULE | Refills: 0 | Status: SHIPPED | OUTPATIENT
Start: 2020-05-16 | End: 2020-06-11 | Stop reason: SDUPTHER

## 2020-03-16 RX ORDER — ZOLPIDEM TARTRATE 5 MG/1
5 TABLET ORAL NIGHTLY PRN
Qty: 26 TABLET | Refills: 2 | Status: SHIPPED | OUTPATIENT
Start: 2020-03-16 | End: 2020-06-11 | Stop reason: SDUPTHER

## 2020-03-16 RX ORDER — DEXTROAMPHETAMINE SACCHARATE, AMPHETAMINE ASPARTATE MONOHYDRATE, DEXTROAMPHETAMINE SULFATE AND AMPHETAMINE SULFATE 7.5; 7.5; 7.5; 7.5 MG/1; MG/1; MG/1; MG/1
30 CAPSULE, EXTENDED RELEASE ORAL EVERY MORNING
Qty: 30 CAPSULE | Refills: 0 | Status: SHIPPED | OUTPATIENT
Start: 2020-03-16 | End: 2020-06-11 | Stop reason: SDUPTHER

## 2020-03-16 RX ORDER — DEXTROAMPHETAMINE SACCHARATE, AMPHETAMINE ASPARTATE MONOHYDRATE, DEXTROAMPHETAMINE SULFATE AND AMPHETAMINE SULFATE 7.5; 7.5; 7.5; 7.5 MG/1; MG/1; MG/1; MG/1
30 CAPSULE, EXTENDED RELEASE ORAL EVERY MORNING
Qty: 30 CAPSULE | Refills: 0 | Status: SHIPPED | OUTPATIENT
Start: 2020-04-16 | End: 2020-06-11 | Stop reason: SDUPTHER

## 2020-03-16 NOTE — PROGRESS NOTES
Chief Complaint:    No chief complaint on file.      History of Present Illness:    This is a virtual visit.  Patient says he has been more depressed and anxious lately and has been having some trouble sleeping.  This is been going on for several days.  No suicidal thoughts or ideation  In the past he had used Effexor that it worked well for him.    He is on ADHD medication working good no side effects   Bipolar disorder stable  He says he is not drinking alcohol come on truly is going to alcohol anonymous    ROS:  Review of Systems   Constitutional: Negative for activity change, chills, fatigue, fever and unexpected weight change.   HENT: Negative for congestion, ear discharge, ear pain, hearing loss, postnasal drip and rhinorrhea.    Eyes: Negative for pain and visual disturbance.   Respiratory: Negative for cough, chest tightness and shortness of breath.    Cardiovascular: Negative for chest pain and palpitations.   Gastrointestinal: Negative for abdominal pain, diarrhea and vomiting.   Endocrine: Negative for heat intolerance.   Genitourinary: Negative for dysuria, flank pain, frequency and hematuria.   Musculoskeletal: Negative for back pain, gait problem and neck pain.   Skin: Negative for color change and rash.   Neurological: Negative for dizziness, tremors, seizures, numbness and headaches.   Psychiatric/Behavioral: Positive for sleep disturbance. Negative for agitation, hallucinations, self-injury and suicidal ideas. The patient is nervous/anxious.        Past Medical History:   Diagnosis Date    ADHD (attention deficit hyperactivity disorder)     Bipolar disorder     DVT (deep venous thrombosis)     Family history of colonic polyps 2/25/2019    He states that his father had colon polyps.    Hypertension        Social History:  Social History     Socioeconomic History    Marital status:      Spouse name: Not on file    Number of children: Not on file    Years of education: Not on file     Highest education level: Not on file   Occupational History    Not on file   Social Needs    Financial resource strain: Not on file    Food insecurity:     Worry: Not on file     Inability: Not on file    Transportation needs:     Medical: Not on file     Non-medical: Not on file   Tobacco Use    Smoking status: Former Smoker     Packs/day: 1.00     Years: 15.00     Pack years: 15.00     Last attempt to quit: 2009     Years since quittin.0    Smokeless tobacco: Never Used   Substance and Sexual Activity    Alcohol use: No    Drug use: No    Sexual activity: Yes     Partners: Female     Birth control/protection: None, Surgical     Comment: Vasectomy   Lifestyle    Physical activity:     Days per week: Not on file     Minutes per session: Not on file    Stress: Not on file   Relationships    Social connections:     Talks on phone: Not on file     Gets together: Not on file     Attends Yazdanism service: Not on file     Active member of club or organization: Not on file     Attends meetings of clubs or organizations: Not on file     Relationship status: Not on file   Other Topics Concern    Not on file   Social History Narrative    Not on file       Family History:   family history includes Diabetes in his father; Heart disease in his paternal grandmother; Hypertension in his father and mother.    Health Maintenance   Topic Date Due    Lipid Panel  2019    TETANUS VACCINE  06/10/2020 (Originally 1986)    Colonoscopy  2029       Physical Exam:     ]    There is no height or weight on file to calculate BMI.    Physical Exam    Results for orders placed or performed in visit on 18   Hemoglobin A1c   Result Value Ref Range    Hemoglobin A1C 5.4 4.0 - 5.6 %    Estimated Avg Glucose 108 68 - 131 mg/dL   Lipid panel   Result Value Ref Range    Cholesterol 198 120 - 199 mg/dL    Triglycerides 97 30 - 150 mg/dL    HDL 58 40 - 75 mg/dL    LDL Cholesterol 120.6 63.0 - 159.0 mg/dL     Hdl/Cholesterol Ratio 29.3 20.0 - 50.0 %    Total Cholesterol/HDL Ratio 3.4 2.0 - 5.0    Non-HDL Cholesterol 140 mg/dL   Comprehensive metabolic panel   Result Value Ref Range    Sodium 138 136 - 145 mmol/L    Potassium 4.5 3.5 - 5.1 mmol/L    Chloride 102 95 - 110 mmol/L    CO2 29 23 - 29 mmol/L    Glucose 93 70 - 110 mg/dL    BUN, Bld 10 6 - 20 mg/dL    Creatinine 1.3 0.5 - 1.4 mg/dL    Calcium 10.2 8.7 - 10.5 mg/dL    Total Protein 7.2 6.0 - 8.4 g/dL    Albumin 4.1 3.5 - 5.2 g/dL    Total Bilirubin 0.7 0.1 - 1.0 mg/dL    Alkaline Phosphatase 78 55 - 135 U/L    AST 24 10 - 40 U/L    ALT 26 10 - 44 U/L    Anion Gap 7 (L) 8 - 16 mmol/L    eGFR if African American >60.0 >60 mL/min/1.73 m^2    eGFR if non African American >60.0 >60 mL/min/1.73 m^2   PSA, Screening   Result Value Ref Range    PSA, SCREEN 1.3 0.00 - 4.00 ng/mL         Lab Results   Component Value Date    HGBA1C 5.4 12/13/2018       Assessment:      ICD-10-CM ICD-9-CM   1. Anxiety and depression F41.9 300.00    F32.9 311   2. Insomnia, unspecified type G47.00 780.52   3. Essential hypertension I10 401.9   4. Attention deficit disorder, unspecified hyperactivity presence F98.8 314.00         Plan:    Will start on venlafaxine 37.5 mg and will titrate upwards as tolerated in the next few days  Add Ambien 5 mg p.o. q.h.s. p.r.n. do not use on a regular basis   looked up prescription for Adderall sent to the pharmacy  Other medical problems stable  Please follow-up with us over the phone in the next few days or sooner if necessary       Current Outpatient Medications   Medication Sig Dispense Refill    amLODIPine (NORVASC) 5 MG tablet Take 1 tablet (5 mg total) by mouth once daily. 90 tablet 3    [START ON 5/16/2020] dextroamphetamine-amphetamine (ADDERALL XR) 30 MG 24 hr capsule Take 1 capsule (30 mg total) by mouth every morning. 30 capsule 0    [START ON 4/16/2020] dextroamphetamine-amphetamine (ADDERALL XR) 30 MG 24 hr capsule Take 1 capsule  (30 mg total) by mouth every morning. 30 capsule 0    dextroamphetamine-amphetamine (ADDERALL XR) 30 MG 24 hr capsule Take 1 capsule (30 mg total) by mouth every morning. 30 capsule 0    lamoTRIgine (LAMICTAL) 200 MG tablet Take 1 tablet (200 mg total) by mouth once daily. 90 tablet 3    lisinopril 10 MG tablet Take 1 tablet (10 mg total) by mouth once daily. 90 tablet 3    mometasone (NASONEX) 50 mcg/actuation nasal spray 2 sprays by Nasal route once daily.  5    venlafaxine (EFFEXOR-XR) 37.5 MG 24 hr capsule Take 1 capsule (37.5 mg total) by mouth once daily. 30 capsule 11    zolpidem (AMBIEN) 5 MG Tab Take 1 tablet (5 mg total) by mouth nightly as needed. 26 tablet 2     No current facility-administered medications for this visit.        Medications Discontinued During This Encounter   Medication Reason    dextroamphetamine-amphetamine (ADDERALL XR) 30 MG 24 hr capsule Reorder    dextroamphetamine-amphetamine (ADDERALL XR) 30 MG 24 hr capsule Reorder    dextroamphetamine-amphetamine (ADDERALL XR) 30 MG 24 hr capsule Reorder       No follow-ups on file.      Oscar Rodríguez MD

## 2020-03-18 ENCOUNTER — PATIENT MESSAGE (OUTPATIENT)
Dept: FAMILY MEDICINE | Facility: CLINIC | Age: 50
End: 2020-03-18

## 2020-04-03 ENCOUNTER — PATIENT MESSAGE (OUTPATIENT)
Dept: FAMILY MEDICINE | Facility: CLINIC | Age: 50
End: 2020-04-03

## 2020-04-03 NOTE — TELEPHONE ENCOUNTER
He is tolerating the 37.5, he said that you were going to raise it if he tolerated the 37.5 you would raise it to 75 mg. I pended the prescription to you

## 2020-04-08 RX ORDER — VENLAFAXINE HYDROCHLORIDE 75 MG/1
75 CAPSULE, EXTENDED RELEASE ORAL DAILY
Qty: 30 CAPSULE | Refills: 4 | Status: SHIPPED | OUTPATIENT
Start: 2020-04-08 | End: 2020-06-11

## 2020-04-21 ENCOUNTER — PATIENT MESSAGE (OUTPATIENT)
Dept: FAMILY MEDICINE | Facility: CLINIC | Age: 50
End: 2020-04-21

## 2020-04-22 ENCOUNTER — OFFICE VISIT (OUTPATIENT)
Dept: FAMILY MEDICINE | Facility: CLINIC | Age: 50
End: 2020-04-22
Payer: COMMERCIAL

## 2020-04-22 DIAGNOSIS — T50.905A ADVERSE EFFECT OF DRUG, INITIAL ENCOUNTER: ICD-10-CM

## 2020-04-22 DIAGNOSIS — F41.9 ANXIETY: Primary | ICD-10-CM

## 2020-04-22 PROCEDURE — 99213 PR OFFICE/OUTPT VISIT, EST, LEVL III, 20-29 MIN: ICD-10-PCS | Mod: 95,,, | Performed by: FAMILY MEDICINE

## 2020-04-22 PROCEDURE — 99213 OFFICE O/P EST LOW 20 MIN: CPT | Mod: 95,,, | Performed by: FAMILY MEDICINE

## 2020-04-22 RX ORDER — BUSPIRONE HYDROCHLORIDE 10 MG/1
10 TABLET ORAL 2 TIMES DAILY
Qty: 60 TABLET | Refills: 11 | Status: SHIPPED | OUTPATIENT
Start: 2020-04-22 | End: 2020-06-11

## 2020-04-22 NOTE — PROGRESS NOTES
Chief Complaint:    No chief complaint on file.      History of Present Illness:  04/22/2020:-  This is a virtual visit he says he has been having sexual side effects from venlafaxine from day 1.  It is making hard to achieve an orgasm.  He is currently on 75 mg.  He says he has had hard time adjusting to the medication.  He had not tried anything else for anxiety before    03/16/2020:-  This is a virtual visit.  Patient says he has been more depressed and anxious lately and has been having some trouble sleeping.  This is been going on for several days.  No suicidal thoughts or ideation  In the past he had used Effexor that it worked well for him.    He is on ADHD medication working good no side effects   Bipolar disorder stable  He says he is not drinking alcohol come on truly is going to alcohol anonymous    ROS:  Review of Systems   Constitutional: Negative for activity change, chills, fatigue, fever and unexpected weight change.   HENT: Negative for congestion, ear discharge, ear pain, hearing loss, postnasal drip and rhinorrhea.    Eyes: Negative for pain and visual disturbance.   Respiratory: Negative for cough, chest tightness and shortness of breath.    Cardiovascular: Negative for chest pain and palpitations.   Gastrointestinal: Negative for abdominal pain, diarrhea and vomiting.   Endocrine: Negative for heat intolerance.   Genitourinary: Negative for dysuria, flank pain, frequency and hematuria.   Musculoskeletal: Negative for back pain, gait problem and neck pain.   Skin: Negative for color change and rash.   Neurological: Negative for dizziness, tremors, seizures, numbness and headaches.   Psychiatric/Behavioral: Positive for sleep disturbance. Negative for agitation, hallucinations, self-injury and suicidal ideas. The patient is nervous/anxious.        Past Medical History:   Diagnosis Date    ADHD (attention deficit hyperactivity disorder)     Bipolar disorder     DVT (deep venous thrombosis)      Family history of colonic polyps 2019    He states that his father had colon polyps.    Hypertension        Social History:  Social History     Socioeconomic History    Marital status:      Spouse name: Not on file    Number of children: Not on file    Years of education: Not on file    Highest education level: Not on file   Occupational History    Not on file   Social Needs    Financial resource strain: Not on file    Food insecurity:     Worry: Not on file     Inability: Not on file    Transportation needs:     Medical: Not on file     Non-medical: Not on file   Tobacco Use    Smoking status: Former Smoker     Packs/day: 1.00     Years: 15.00     Pack years: 15.00     Last attempt to quit: 2009     Years since quittin.1    Smokeless tobacco: Never Used   Substance and Sexual Activity    Alcohol use: No    Drug use: No    Sexual activity: Yes     Partners: Female     Birth control/protection: None, Surgical     Comment: Vasectomy   Lifestyle    Physical activity:     Days per week: Not on file     Minutes per session: Not on file    Stress: Not on file   Relationships    Social connections:     Talks on phone: Not on file     Gets together: Not on file     Attends Caodaism service: Not on file     Active member of club or organization: Not on file     Attends meetings of clubs or organizations: Not on file     Relationship status: Not on file   Other Topics Concern    Not on file   Social History Narrative    Not on file       Family History:   family history includes Diabetes in his father; Heart disease in his paternal grandmother; Hypertension in his father and mother.    Health Maintenance   Topic Date Due    Lipid Panel  2019    TETANUS VACCINE  06/10/2020 (Originally 1988)    Colonoscopy  2029       Physical Exam:     ]    There is no height or weight on file to calculate BMI.    Physical Exam    Results for orders placed or performed in visit  on 12/13/18   Hemoglobin A1c   Result Value Ref Range    Hemoglobin A1C 5.4 4.0 - 5.6 %    Estimated Avg Glucose 108 68 - 131 mg/dL   Lipid panel   Result Value Ref Range    Cholesterol 198 120 - 199 mg/dL    Triglycerides 97 30 - 150 mg/dL    HDL 58 40 - 75 mg/dL    LDL Cholesterol 120.6 63.0 - 159.0 mg/dL    Hdl/Cholesterol Ratio 29.3 20.0 - 50.0 %    Total Cholesterol/HDL Ratio 3.4 2.0 - 5.0    Non-HDL Cholesterol 140 mg/dL   Comprehensive metabolic panel   Result Value Ref Range    Sodium 138 136 - 145 mmol/L    Potassium 4.5 3.5 - 5.1 mmol/L    Chloride 102 95 - 110 mmol/L    CO2 29 23 - 29 mmol/L    Glucose 93 70 - 110 mg/dL    BUN, Bld 10 6 - 20 mg/dL    Creatinine 1.3 0.5 - 1.4 mg/dL    Calcium 10.2 8.7 - 10.5 mg/dL    Total Protein 7.2 6.0 - 8.4 g/dL    Albumin 4.1 3.5 - 5.2 g/dL    Total Bilirubin 0.7 0.1 - 1.0 mg/dL    Alkaline Phosphatase 78 55 - 135 U/L    AST 24 10 - 40 U/L    ALT 26 10 - 44 U/L    Anion Gap 7 (L) 8 - 16 mmol/L    eGFR if African American >60.0 >60 mL/min/1.73 m^2    eGFR if non African American >60.0 >60 mL/min/1.73 m^2   PSA, Screening   Result Value Ref Range    PSA, SCREEN 1.3 0.00 - 4.00 ng/mL         Lab Results   Component Value Date    HGBA1C 5.4 12/13/2018       Assessment:      ICD-10-CM ICD-9-CM   1. Anxiety F41.9 300.00   2. Adverse effect of drug, initial encounter T50.905A E947.9         Plan:    Will wean off venlafaxine, he will go down to 37.5 mg and use that for about a week to 10 days and then he will quad rate for another week and then stop it  Will start on BuSpar  He will let us know should he have any side effects from it    The patient location is: Home   The chief complaint leading to consultation is: as below  Visit type: Virtual visit with synchronous audio and video  Total time spent with patient: 20+ mins  Each patient to whom he or she provides medical services by telemedicine is:  (1) informed of the relationship between the physician and patient and the  respective role of any other health care provider with respect to management of the patient; and (2) notified that he or she may decline to receive medical services by telemedicine and may withdraw from such care at any time.    Notes: see below       Current Outpatient Medications   Medication Sig Dispense Refill    amLODIPine (NORVASC) 5 MG tablet Take 1 tablet (5 mg total) by mouth once daily. 90 tablet 3    busPIRone (BUSPAR) 10 MG tablet Take 1 tablet (10 mg total) by mouth 2 (two) times daily. 60 tablet 11    [START ON 5/16/2020] dextroamphetamine-amphetamine (ADDERALL XR) 30 MG 24 hr capsule Take 1 capsule (30 mg total) by mouth every morning. 30 capsule 0    dextroamphetamine-amphetamine (ADDERALL XR) 30 MG 24 hr capsule Take 1 capsule (30 mg total) by mouth every morning. 30 capsule 0    dextroamphetamine-amphetamine (ADDERALL XR) 30 MG 24 hr capsule Take 1 capsule (30 mg total) by mouth every morning. 30 capsule 0    lamoTRIgine (LAMICTAL) 200 MG tablet Take 1 tablet (200 mg total) by mouth once daily. 90 tablet 3    lisinopril 10 MG tablet Take 1 tablet (10 mg total) by mouth once daily. 90 tablet 3    mometasone (NASONEX) 50 mcg/actuation nasal spray 2 sprays by Nasal route once daily.  5    venlafaxine (EFFEXOR-XR) 75 MG 24 hr capsule Take 1 capsule (75 mg total) by mouth once daily. 30 capsule 4    zolpidem (AMBIEN) 5 MG Tab Take 1 tablet (5 mg total) by mouth nightly as needed. 26 tablet 2     No current facility-administered medications for this visit.        There are no discontinued medications.    No follow-ups on file.      Oscar Rodríguez MD

## 2020-05-15 ENCOUNTER — PATIENT MESSAGE (OUTPATIENT)
Dept: FAMILY MEDICINE | Facility: CLINIC | Age: 50
End: 2020-05-15

## 2020-06-11 ENCOUNTER — OFFICE VISIT (OUTPATIENT)
Dept: FAMILY MEDICINE | Facility: CLINIC | Age: 50
End: 2020-06-11
Payer: COMMERCIAL

## 2020-06-11 DIAGNOSIS — F98.8 ATTENTION DEFICIT DISORDER, UNSPECIFIED HYPERACTIVITY PRESENCE: Primary | ICD-10-CM

## 2020-06-11 DIAGNOSIS — F31.78 BIPOLAR DISORDER, IN FULL REMISSION, MOST RECENT EPISODE MIXED: ICD-10-CM

## 2020-06-11 DIAGNOSIS — I10 ESSENTIAL HYPERTENSION: ICD-10-CM

## 2020-06-11 PROCEDURE — 99214 OFFICE O/P EST MOD 30 MIN: CPT | Mod: 95,,, | Performed by: FAMILY MEDICINE

## 2020-06-11 PROCEDURE — 99214 PR OFFICE/OUTPT VISIT, EST, LEVL IV, 30-39 MIN: ICD-10-PCS | Mod: 95,,, | Performed by: FAMILY MEDICINE

## 2020-06-11 RX ORDER — DEXTROAMPHETAMINE SACCHARATE, AMPHETAMINE ASPARTATE MONOHYDRATE, DEXTROAMPHETAMINE SULFATE AND AMPHETAMINE SULFATE 7.5; 7.5; 7.5; 7.5 MG/1; MG/1; MG/1; MG/1
30 CAPSULE, EXTENDED RELEASE ORAL EVERY MORNING
Qty: 30 CAPSULE | Refills: 0 | Status: SHIPPED | OUTPATIENT
Start: 2020-06-15 | End: 2020-10-19

## 2020-06-11 RX ORDER — LAMOTRIGINE 200 MG/1
200 TABLET ORAL DAILY
Qty: 90 TABLET | Refills: 3 | Status: SHIPPED | OUTPATIENT
Start: 2020-06-11 | End: 2020-10-19 | Stop reason: SDUPTHER

## 2020-06-11 RX ORDER — ZOLPIDEM TARTRATE 5 MG/1
5 TABLET ORAL NIGHTLY PRN
Qty: 26 TABLET | Refills: 2 | Status: SHIPPED | OUTPATIENT
Start: 2020-06-15 | End: 2020-10-19

## 2020-06-11 RX ORDER — AMLODIPINE BESYLATE 5 MG/1
5 TABLET ORAL DAILY
Qty: 90 TABLET | Refills: 3 | Status: SHIPPED | OUTPATIENT
Start: 2020-06-11 | End: 2021-07-08

## 2020-06-11 RX ORDER — LISINOPRIL 10 MG/1
10 TABLET ORAL DAILY
Qty: 90 TABLET | Refills: 3 | Status: SHIPPED | OUTPATIENT
Start: 2020-06-11 | End: 2021-07-08

## 2020-06-11 RX ORDER — DEXTROAMPHETAMINE SACCHARATE, AMPHETAMINE ASPARTATE MONOHYDRATE, DEXTROAMPHETAMINE SULFATE AND AMPHETAMINE SULFATE 7.5; 7.5; 7.5; 7.5 MG/1; MG/1; MG/1; MG/1
30 CAPSULE, EXTENDED RELEASE ORAL EVERY MORNING
Qty: 30 CAPSULE | Refills: 0 | Status: SHIPPED | OUTPATIENT
Start: 2020-07-15 | End: 2020-10-19

## 2020-06-11 RX ORDER — DEXTROAMPHETAMINE SACCHARATE, AMPHETAMINE ASPARTATE MONOHYDRATE, DEXTROAMPHETAMINE SULFATE AND AMPHETAMINE SULFATE 7.5; 7.5; 7.5; 7.5 MG/1; MG/1; MG/1; MG/1
30 CAPSULE, EXTENDED RELEASE ORAL EVERY MORNING
Qty: 30 CAPSULE | Refills: 0 | Status: SHIPPED | OUTPATIENT
Start: 2020-08-15 | End: 2020-10-19

## 2020-06-11 NOTE — PROGRESS NOTES
Chief Complaint:  No chief complaint on file.      History of Present Illness:    Here for medication refill for hypertension and bipolar and ADD and insomnia medicine working well no side effect  He quit taking venlafaxine and BuSpar because of the side effects.    ROS:  Review of Systems   Constitutional: Negative for activity change and unexpected weight change.   HENT: Negative for hearing loss, rhinorrhea and trouble swallowing.    Eyes: Negative for discharge and visual disturbance.   Respiratory: Negative for chest tightness and wheezing.    Cardiovascular: Negative for chest pain and palpitations.   Gastrointestinal: Negative for blood in stool, constipation, diarrhea and vomiting.   Endocrine: Negative for polydipsia and polyuria.   Genitourinary: Negative for difficulty urinating, hematuria and urgency.   Musculoskeletal: Negative for arthralgias, joint swelling and neck pain.   Neurological: Negative for weakness and headaches.   Psychiatric/Behavioral: Negative for confusion and dysphoric mood.       Past Medical History:   Diagnosis Date    ADHD (attention deficit hyperactivity disorder)     Bipolar disorder     DVT (deep venous thrombosis)     Family history of colonic polyps 2019    He states that his father had colon polyps.    Hypertension        Social History:  Social History     Socioeconomic History    Marital status:      Spouse name: Not on file    Number of children: Not on file    Years of education: Not on file    Highest education level: Not on file   Occupational History    Not on file   Social Needs    Financial resource strain: Not on file    Food insecurity:     Worry: Not on file     Inability: Not on file    Transportation needs:     Medical: Not on file     Non-medical: Not on file   Tobacco Use    Smoking status: Former Smoker     Packs/day: 1.00     Years: 15.00     Pack years: 15.00     Last attempt to quit: 2009     Years since quittin.2     Smokeless tobacco: Never Used   Substance and Sexual Activity    Alcohol use: No    Drug use: No    Sexual activity: Yes     Partners: Female     Birth control/protection: None, Surgical     Comment: Vasectomy   Lifestyle    Physical activity:     Days per week: Not on file     Minutes per session: Not on file    Stress: Not on file   Relationships    Social connections:     Talks on phone: Not on file     Gets together: Not on file     Attends Synagogue service: Not on file     Active member of club or organization: Not on file     Attends meetings of clubs or organizations: Not on file     Relationship status: Not on file   Other Topics Concern    Not on file   Social History Narrative    Not on file       Family History:   family history includes Diabetes in his father; Heart disease in his paternal grandmother; Hypertension in his father and mother.    Health Maintenance   Topic Date Due    TETANUS VACCINE  05/23/1986    Lipid Panel  12/13/2019    Colonoscopy  02/25/2029       Physical Exam:     ]    There is no height or weight on file to calculate BMI.    Physical Exam      Assessment:      ICD-10-CM ICD-9-CM   1. Attention deficit disorder, unspecified hyperactivity presence F98.8 314.00   2. Bipolar disorder, in full remission, most recent episode mixed F31.78 296.66         Plan:          looked up medicine refill sent to the pharmacy  The patient location is: Home   The chief complaint leading to consultation is: as below  Visit type: Virtual visit with synchronous audio and video  Total time spent with patient: 20+ mins  Each patient to whom he or she provides medical services by telemedicine is:  (1) informed of the relationship between the physician and patient and the respective role of any other health care provider with respect to management of the patient; and (2) notified that he or she may decline to receive medical services by telemedicine and may withdraw from such care at any  time.    Notes: see below        No orders of the defined types were placed in this encounter.      Current Outpatient Medications   Medication Sig Dispense Refill    amLODIPine (NORVASC) 5 MG tablet Take 1 tablet (5 mg total) by mouth once daily. 90 tablet 3    [START ON 8/15/2020] dextroamphetamine-amphetamine (ADDERALL XR) 30 MG 24 hr capsule Take 1 capsule (30 mg total) by mouth every morning. 30 capsule 0    [START ON 7/15/2020] dextroamphetamine-amphetamine (ADDERALL XR) 30 MG 24 hr capsule Take 1 capsule (30 mg total) by mouth every morning. 30 capsule 0    [START ON 6/15/2020] dextroamphetamine-amphetamine (ADDERALL XR) 30 MG 24 hr capsule Take 1 capsule (30 mg total) by mouth every morning. 30 capsule 0    lamoTRIgine (LAMICTAL) 200 MG tablet Take 1 tablet (200 mg total) by mouth once daily. 90 tablet 3    lisinopriL 10 MG tablet Take 1 tablet (10 mg total) by mouth once daily. 90 tablet 3    mometasone (NASONEX) 50 mcg/actuation nasal spray 2 sprays by Nasal route once daily.  5    [START ON 6/15/2020] zolpidem (AMBIEN) 5 MG Tab Take 1 tablet (5 mg total) by mouth nightly as needed. 26 tablet 2     No current facility-administered medications for this visit.        Medications Discontinued During This Encounter   Medication Reason    busPIRone (BUSPAR) 10 MG tablet     dextroamphetamine-amphetamine (ADDERALL XR) 30 MG 24 hr capsule Reorder    dextroamphetamine-amphetamine (ADDERALL XR) 30 MG 24 hr capsule Reorder    dextroamphetamine-amphetamine (ADDERALL XR) 30 MG 24 hr capsule Reorder    zolpidem (AMBIEN) 5 MG Tab Reorder    amLODIPine (NORVASC) 5 MG tablet Reorder    lisinopril 10 MG tablet Reorder    lamoTRIgine (LAMICTAL) 200 MG tablet Reorder    venlafaxine (EFFEXOR-XR) 75 MG 24 hr capsule        No follow-ups on file.      Oscar Rodríguez MD

## 2020-07-22 NOTE — PROGRESS NOTES
10.9.2018 Per [employer], Message was delivered on an answering machine  
no known mental health issues.

## 2020-09-02 ENCOUNTER — OFFICE VISIT (OUTPATIENT)
Dept: PSYCHIATRY | Facility: CLINIC | Age: 50
End: 2020-09-02
Payer: COMMERCIAL

## 2020-09-02 DIAGNOSIS — F14.10 COCAINE ABUSE: ICD-10-CM

## 2020-09-02 DIAGNOSIS — F10.11 ALCOHOL ABUSE, IN REMISSION: ICD-10-CM

## 2020-09-02 DIAGNOSIS — F31.9 BIPOLAR 1 DISORDER, DEPRESSED: Primary | ICD-10-CM

## 2020-09-02 DIAGNOSIS — F17.220 NICOTINE DEPENDENCE, CHEWING TOBACCO, UNCOMPLICATED: ICD-10-CM

## 2020-09-02 DIAGNOSIS — G47.00 INSOMNIA, UNSPECIFIED TYPE: ICD-10-CM

## 2020-09-02 DIAGNOSIS — F90.2 ADHD (ATTENTION DEFICIT HYPERACTIVITY DISORDER), COMBINED TYPE: ICD-10-CM

## 2020-09-02 DIAGNOSIS — F40.10 SOCIAL ANXIETY DISORDER: ICD-10-CM

## 2020-09-02 PROCEDURE — 99999 PR PBB SHADOW E&M-EST. PATIENT-LVL I: CPT | Mod: PBBFAC,,, | Performed by: PSYCHIATRY & NEUROLOGY

## 2020-09-02 PROCEDURE — 90792 PSYCH DIAG EVAL W/MED SRVCS: CPT | Mod: S$GLB,,, | Performed by: PSYCHIATRY & NEUROLOGY

## 2020-09-02 PROCEDURE — 99999 PR PBB SHADOW E&M-EST. PATIENT-LVL I: ICD-10-PCS | Mod: PBBFAC,,, | Performed by: PSYCHIATRY & NEUROLOGY

## 2020-09-02 PROCEDURE — 90792 PR PSYCHIATRIC DIAGNOSTIC EVALUATION W/MEDICAL SERVICES: ICD-10-PCS | Mod: S$GLB,,, | Performed by: PSYCHIATRY & NEUROLOGY

## 2020-09-02 RX ORDER — SERTRALINE HYDROCHLORIDE 50 MG/1
TABLET, FILM COATED ORAL
Qty: 30 TABLET | Refills: 1 | Status: SHIPPED | OUTPATIENT
Start: 2020-09-02 | End: 2020-09-28

## 2020-09-02 RX ORDER — DEXTROAMPHETAMINE SACCHARATE, AMPHETAMINE ASPARTATE MONOHYDRATE, DEXTROAMPHETAMINE SULFATE AND AMPHETAMINE SULFATE 7.5; 7.5; 7.5; 7.5 MG/1; MG/1; MG/1; MG/1
30 CAPSULE, EXTENDED RELEASE ORAL EVERY MORNING
Qty: 30 CAPSULE | Refills: 0 | Status: SHIPPED | OUTPATIENT
Start: 2020-10-14 | End: 2020-10-19

## 2020-09-02 RX ORDER — DEXTROAMPHETAMINE SACCHARATE, AMPHETAMINE ASPARTATE MONOHYDRATE, DEXTROAMPHETAMINE SULFATE AND AMPHETAMINE SULFATE 7.5; 7.5; 7.5; 7.5 MG/1; MG/1; MG/1; MG/1
30 CAPSULE, EXTENDED RELEASE ORAL EVERY MORNING
Qty: 30 CAPSULE | Refills: 0 | Status: SHIPPED | OUTPATIENT
Start: 2020-09-14 | End: 2020-10-14

## 2020-09-02 RX ORDER — ZOLPIDEM TARTRATE 5 MG/1
5 TABLET ORAL NIGHTLY PRN
Qty: 30 TABLET | Refills: 1 | Status: SHIPPED | OUTPATIENT
Start: 2020-09-02 | End: 2020-10-19 | Stop reason: SDUPTHER

## 2020-09-02 NOTE — PROGRESS NOTES
"PSYCHIATRIC EVALUATION     Disclaimer: Evaluation and treatment is based on information presented to date. Any new information may affect assessment and findings.     Name: Andrei Casanova  Age: 50 y.o.  : 1970      Note: I reviewed Epic EHR_"Encounters" Info for general background info.      CHIEF COMPLAINT: referred by Tegan SCHNEIDER; largely life long social anxiety; control problems with alcohol though in remission, was pt of  Carlos Barber MD x years (until Dr Segovia retired)    HISTORY:         Andrei Casanova a 50 y.o. Bipolar (depressed)  male presents today as referred by [facility] Quereshi MD    Was seeing Carlos Barber MD for 8-10 yrs;he retired. 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 Dr Javed has continued on meds as from Dr Segovia    Pt has been maintained on:  on lamictal 200 mg (no rash)   adderall XR 30   ambien  5mg    Sober x 1 yr as of 2020 via AA; prior to that 9 and 1/2 yr after " "relapsed    Says has sponsor    Says 'to be up front have had substance issues: alcohol and cocaine     Social anxiety ' big struggle for long time' ; more recently though time leave house; always struggle; people who not know think I outgoing; tho says push through it; makes feel present someone I am not    prior Juliette MD: tried paxil zoloft thsylwia no clear recall of how did: interest to try /    Never abilify jack    Says he did Tried effexor XR (75 mg) difficult sex side effect;  Denies any suicidal ideation / nor gesture / nor any thoughts to harm other     No psychosis      Symptom Clusters:  Depressive Disorder: REPORTS depressed mood, irritable mood, appetite/weight change, sleep change, tired/fatigued, psychomotor change, worthlessness, concentration problems.   Anxiety Disorder: irritability, sleep problems, agoraphobia, excessive worry, avoidance symptoms.   Manic Disorder: REPORTS irritable mood, increased distractability, mixed with depression symptoms.   Psychotic Disorder: DENIES all.   Substance Use:  REPORTS alcohol, drugs, says as of 9- will be sober x 1 yr; in past: [facility]; in past up to 1/5 a day; aklso in past cocaine.   Physical or Sexual Abuse: DENIES all.         Self Rating Scales: (Such submitted for scanning) :     Quick Inventory of Depression (QID-Short Form):21  Zung Anxiety Index:78   Mood Disorder Questionnaire (MDQ): 8  The Milost Framework: a "bio-psycho-social" screening form for use as clinical raw data. / (submitted for scanning)     Mood Disorder Questionnaire (MDQ):     Has there ever been a time when you were not yourself (and while not using drugs or alcohol) that:    N  you felt SO GOOD OR HYPER (that others thought you not your normal self OR that being so hyper got you in trouble)?  Y  you felt SO IRRITABLE  (such that shouted at people or start fights)?   Y  you felt MORE SELF CONFIDENT than usual?  N   you got MUCH LESS SLEEP than usual and felt you really " did not miss it?   N  You were MUCH MORE TALKATIVE or spoke faster than usual?  Y THOUGHTS RACED  through your head OR you just could not slow down your mind?   Y You were so EASILY DISTRACTED BY THINGS AROUND YOU that you had trouble concentrating or staying on track ?   Y Had MUCH MORE ENERGY than usual?  Y Were MUCH MORE ACTIVE  OR DID MAY MORE THINGS  than usual ?   Y You were MUCH MORE SOCIAL OR OUTGOING  than usual?  Y You were MUCH MORE INTERESTED IN SEX than usual ?  N You  DID THINGS  that were UNUSUAL FOR YOU or that OTHER PEOPLE  might have thought: EXCESSIVE, FOOLISH, RISKY?  Y SPENDING MONEY got you in trouble?    Re: above N=no ; Y =yes     Any same time?  Y    How much of a problem did Any of these cause you?  - (Ex: like being unable to work ; having : Family, or legal troubles or getting into arguments or fights?   Minor     Q: Did any blood  relatives of groups below have any of problems listed below ?     Grandparent: Drug problems brother drug problem   Parents uncles / depression   Daughter derepssion    Q: Has Any HEALTH PROFESSIONAL ever told you that you have Manic Depression OR Bipolar?  Yes Carlos Barber MD           World Health Organization (W.H.O.) Adult ADHD Self Report Scale (ASRS-henrique 1.1):  5 of 6 core and 6 of 12 adjunctive    World Health Organization (W.H.O.) Adult ADHD Self Report Scale (ASRS-henrique 1.1) / (ADHD Screening):    Endorses 5 symptoms of CORE features of ADHD  :    Trouble wrapping up final details of a project once challenge parts have been done  y   Difficulty getting things in order when have a task that requies organization  y   Problems remembering appointments or obligations  y   When task requirs a lot thought; avoid or delay getting started  y   fidget or squirm when have to sit down for long time  y   Feel overly active and compelled to do things, like  driven by a motor:  rarely     Endorsed 6 of 12  ADJUNCTIVE features of ADHD:    Making careless mistakes  when has to work on boring on a project / sometimes   Difficulty keeping attention when have to work on boring or repetitive work y   Difficulty concentrating on what people say even when they are spoken to directly y   Misplace things at home or work y   Easily distracted by activity or noise around them y   Leave seat when expected to remain seated y   Feel restless or fidgety y   Difficulty unwinding when have time to self sometimes    Talking too much in social situations never   When in conversation, finding self finished sentences of others rarely   Difficult waiting turn in situations taking turn expected sometimes    Interrupt others: rarely    PAST PSYCHIATRIC HISTORY:     Inpatient: n  Outpatient: (incl. Primary Care): bradley Barber MD    Allergy Review:   Review of patient's allergies indicates:  No Known Allergies     Medical Problem List:   Patient Active Problem List   Diagnosis    ADD (attention deficit disorder)    Bipolar affective    HTN (hypertension)    Alcohol abuse, in remission    Chews tobacco regularly    Mixed hyperlipidemia    Colon cancer screening    Special screening for malignant neoplasms, colon    Family history of colonic polyps    Bipolar 1 disorder, depressed    Social anxiety disorder    ADHD (attention deficit hyperactivity disorder), combined type    Cocaine abuse; full remission x years    Nicotine dependence, chewing tobacco, uncomplicated    Insomnia        Past Surgical History:   Procedure Laterality Date    APPENDECTOMY      COLONOSCOPY N/A 2/25/2019    Procedure: COLONOSCOPY;  Surgeon: Denis Eller MD;  [facility];  Service: Endoscopy;  Laterality: N/A;    KNEE SURGERY      right    SHOULDER SURGERY      bilateral    SINUS SURGERY      TONSILLECTOMY      VASECTOMY          Other (medical) :    Head Injury: n  Seizure: cocaine use / in 20s; late 30's (couple mre)  Diabetes :n  HTN Yes   Other:     Substance Use:    Once start [employer] avoid cannabis    When revert would isolate ; did for release ; drug choice was cocaine    Alcohol: last 9-8 2020;   Start: 10y  regular use: 12y UNTIL decaon (1st period sober) when left start back drinking  At 12y: every weekend: beer 6 12 oa  16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 / 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; 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 (2-6 beer)    Blackouts: deneis   No seizure:    Relationships: when drink / isolate / fear of work consequence IF they learned     Was drinking 5th a day (3 yr ago; "drank hard = 5th a day"); until passed out; had get up 4 a or 4;30a ; in fear breathalizer; combat by get [employer] rodríguez     After deacon daily and coacine    Went rehab: [facility] 1 month 2008 ; 2 months prior: in [facility]: 21 days (out pocket private); none other    When went [place], self report drugs and alcohol; they said do not worry; when got back ; was sent psychiatrist thsylwia said lied on questionairre as he said not drinknig so terminated    2 and 1/2 yrs later: he contact [facility] ; says was wrongfully terminated as self report; go job back    Says for 1st 2 y back weekly drug test and random; and employer said go see psyc MD and go to counselor ; NOW just random.    Cannabis: 5th grade 1st  Try; 8th grade : at least 1x week; daily use: 16y until 18y then when came across    Tobacco:former ; stopped 2008 / start 21 yrs olf  Cocaine:years ago   Benzo: n  Opioid: n  Ecstasy:n  Other:n    Family History:  Family History   Problem Relation Age of Onset    Hypertension Mother     Hypertension Father     Diabetes Father     Heart disease Paternal Grandmother       Grandparent: Drug problems brother drug problem   Parents uncles / depression   Daughter depression    Mother and aunt history significant anxiety       Mental Status Exam:   Appearance: casual /   Oriented: x 3 / including: Date: Sept 2 2020; and aware that at: [facility] [place],   Attitude: " cooperative engaging pleasant   Eye Contact: good   Behavior: calm here   Mood: says Ok   Cognition: alert / 20-3: 17, 14, 11, 8, 5 ,2   Concentration: grossly intact   Affect: appropriate range   Anxiety: moderate / to significant: relates to : work and social anxiety   Thought Process: goal directed   Speech: Volume : WNL   Quantity WNL   Quality: appears to openly answer questions   Eye Contact: good   Insight: fair to good   Threats: no SI / HI   Memory: intact (as relay information across time spans) Pres: Trump ; prior: Obama   Psychosis: denies all   Estimate of Intellectual Function: average   Judgment (to simple situation): if saw a house on fire / A: call 911   Impulse Control: no history SI / nor HI ; calm here     3 wishes? :  Serenity, comfortable in skin , (20y) daughter not have seizures or migranes     PSYCHO-SOCIAL DEVELOPMENT HISTORY:     [facility] Born: [facility]    Siblings (full or half)  Brothers: 1 older 2 1/2   Sisters: none    Parents : alive     Briefly Describe  your Mom: poor health controlling   Briefly Describe your Dad: poor health unhappy anxious    Bio Mom: Occupation:    Bio Dad:  Occupation: car sales     Marital Status:  / x2 to same lady (15 y / 1995); 2 1/2 y Neida ([place], [place]) ; speak frequently ; she RN school     Children   Girls  (ages): 1 daughter 20 y: seizures migraines vertigo / depression anxiety / [employer] / 2nd yr journalism  Boys (ages):     Physical Abuse: N    Sexual abuse  N    Other trauma / abuse? 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 n  Assessment:     Encounter Diagnoses   Name  Primary?    Bipolar 1 disorder, depressed Yes    Social anxiety disorder     Alcohol abuse, in remission, sober 1 year 9-; in past 1/5 th a day      ADHD (attention deficit hyperactivity disorder), combined type     Cocaine abuse; full remission x years     Nicotine dependence, chewing tobacco, uncomplicated     Insomnia, unspecified type         Patient Instructions     PLAN:     Follow up Sept 28 29 or 30th ; call [phone] to schedule    Schedule with [facility] Scot Dufrene LCSW / next available intake: anxiety, social phobia, mood    Meds: has enough lamictal 200 mg; renewed ambien 5 mg  and adderall XR 30 mg; discussed pro / con: he opts for zoloft / added 50 mg / tapering up top such; stop if any problems     References: (reviewed with pt as well):    Anxiety &  phobia workbook by JULIANO Espitia PhD  (web retailers: used: $ 7-10)    Relaxation stress reduction workbook: ALFA Lal PhD ( used: $7-10)    Feeling Good Website: Kyree Sweeney MD / www.[employer] website (free)     [place]: Path to Better Sleep : https://www.veterantraining.[place].gov/insomnia/ (free)     [place] Quit with [place]: http://www.quitwithusla.org/  (and other resources as per counselor, if applicable) / he uses chewing tobacco     Pt expressed appreciation for the visit today and did not have further question at this time though pt  was still informed to:     Call  if problems.    Call / Report Side Effects to Psyc MD     Encouraged to follow up with primary care / Gen Med MD for continued monitoring of general health and wellness.    Understanding was expressed; and no further concerns nor questions were raised at this time.     remember healthy self care:   eat right  attempt adequate rest   HANDWASHING / encourage such paris. During this corona virus time   walk or light exercise within reason and as your general med team approves  read or explore any of reference materials / homework mentioned  reach out (I.e.,  connect with)   others who nuture and bring out best in you  avoid risky behaviors  keep your appointments  IF you  cannot make your appt THEN please call or go online to reschedule.  avoid  alcohol and illicit substances.  Look for the positive.  All is often relative-seek balance  Call sooner if needed : [phone]   Call 911 or go to Emergency Room  (ER)  if any acute concerns

## 2020-09-02 NOTE — PATIENT INSTRUCTIONS
PLAN:     Follow up Sept 28 29 or 30th ; call [phone] to schedule    Schedule with [facility] Scot Dufrene LCSW / next available intake: anxiety, social phobia, mood    Meds: has enough lamictal 200 mg; renewed ambien 5 mg  and adderall XR 30 mg; discussed pro / con: he opts for zoloft / added 50 mg / tapering up top such; stop if any problems     References: (reviewed with pt as well):    Anxiety &  phobia workbook by JULIANO Espitia PhD  (web retailers: used: $ 7-10)    Relaxation stress reduction workbook: ALFA Lal PhD ( used: $7-10)    Feeling Good Website: Kyree Sweeney MD / www.[employer] website (free)     [place]: Path to Better Sleep : https://www.veterantraining.[place].gov/insomnia/ (free)     [place] with [place]: http://www.quitwithusla.org/  (and other resources as per counselor, if applicable) / he uses chewing tobacco     Pt expressed appreciation for the visit today and did not have further question at this time though pt  was still informed to:     Call  if problems.    Call / Report Side Effects to Psyc MD     Encouraged to follow up with primary care / Gen Med MD for continued monitoring of general health and wellness.    Understanding was expressed; and no further concerns nor questions were raised at this time.     remember healthy self care:   eat right  attempt adequate rest   HANDWASHING / encourage such paris. During this corona virus time   walk or light exercise within reason and as your general med team approves  read or explore any of reference materials / homework mentioned  reach out (I.e.,  connect with)  others who nuture and bring out best in you  avoid risky behaviors  keep your appointments  IF you  cannot make your appt THEN please call or go online to reschedule.  avoid  alcohol and illicit substances.  Look for the positive.  All is often relative-seek balance  Call sooner if needed : [phone]   Call 911 or go to Emergency Room  (ER)  if any acute concerns

## 2020-09-10 ENCOUNTER — TELEPHONE (OUTPATIENT)
Dept: PSYCHIATRY | Facility: CLINIC | Age: 50
End: 2020-09-10

## 2020-09-28 ENCOUNTER — OFFICE VISIT (OUTPATIENT)
Dept: PSYCHIATRY | Facility: CLINIC | Age: 50
End: 2020-09-28
Payer: COMMERCIAL

## 2020-09-28 VITALS
DIASTOLIC BLOOD PRESSURE: 85 MMHG | HEART RATE: 74 BPM | WEIGHT: 202.63 LBS | SYSTOLIC BLOOD PRESSURE: 125 MMHG | BODY MASS INDEX: 29.07 KG/M2

## 2020-09-28 DIAGNOSIS — G47.00 INSOMNIA, UNSPECIFIED TYPE: ICD-10-CM

## 2020-09-28 DIAGNOSIS — F10.11 ALCOHOL ABUSE, IN REMISSION: ICD-10-CM

## 2020-09-28 DIAGNOSIS — F31.9 BIPOLAR 1 DISORDER, DEPRESSED: Primary | ICD-10-CM

## 2020-09-28 DIAGNOSIS — F90.2 ADHD (ATTENTION DEFICIT HYPERACTIVITY DISORDER), COMBINED TYPE: ICD-10-CM

## 2020-09-28 DIAGNOSIS — F40.10 SOCIAL ANXIETY DISORDER: ICD-10-CM

## 2020-09-28 DIAGNOSIS — F14.10 COCAINE ABUSE: ICD-10-CM

## 2020-09-28 DIAGNOSIS — F17.220 NICOTINE DEPENDENCE, CHEWING TOBACCO, UNCOMPLICATED: ICD-10-CM

## 2020-09-28 PROCEDURE — 99214 OFFICE O/P EST MOD 30 MIN: CPT | Mod: S$GLB,,, | Performed by: PSYCHIATRY & NEUROLOGY

## 2020-09-28 PROCEDURE — 99214 PR OFFICE/OUTPT VISIT, EST, LEVL IV, 30-39 MIN: ICD-10-PCS | Mod: S$GLB,,, | Performed by: PSYCHIATRY & NEUROLOGY

## 2020-09-28 PROCEDURE — 99999 PR PBB SHADOW E&M-EST. PATIENT-LVL II: CPT | Mod: PBBFAC,,, | Performed by: PSYCHIATRY & NEUROLOGY

## 2020-09-28 PROCEDURE — 99999 PR PBB SHADOW E&M-EST. PATIENT-LVL II: ICD-10-PCS | Mod: PBBFAC,,, | Performed by: PSYCHIATRY & NEUROLOGY

## 2020-09-28 RX ORDER — MIRTAZAPINE 15 MG/1
TABLET, FILM COATED ORAL
Qty: 30 TABLET | Refills: 2 | Status: SHIPPED | OUTPATIENT
Start: 2020-09-28 | End: 2020-10-04

## 2020-09-28 NOTE — PROGRESS NOTES
"    Timeframe: Corona Virus Outbreak     Andrei Casanova   1970 09/28/2020     Disclaimer: Evaluation and treatment is based on information presented to date. Any new information may affect assessment and findings.     Location: IN CLINIC     Who (in attendance) :  Pt himself       S: Patient's Own Perception of Condition (& Side Effects) : sexual dysfunction / anorgasmia / on zoloft      O:     Vitals:    09/28/20 0846   BP: 125/85   Pulse: 74        CURRENT PRESENTATION:     zoloft helped anxiety / tho sexual dysfuinction > D/C Zoloft / added as allergy (intolerant): anorgasmia    Safety:no SI / no HI     Supports: enjoys his daughter ; she graduated in  [employer] recently; (he is concerned about her seizure history)     Work / School     Activities tends like more solitarty activities    Medication Adjustments / Renewals:     spent majority time  / re: med mngt and alternatives to zoloft ;  Has tried / fauiled wellbutrin / not like way felt  Has now tried zoloft / sex dysfunction  Now to try remeron    IF not work: discussed latuda, abilify  / also possible jack    Has enough other meds / tho stopping zoloft  / and write for remeron / RTC 10-19-20 at 9am     Counselor/ Coping Skills / Counseling : offerrerd tho politely declines ; aware can revisit    Staying "Sober / "clean"  (I.e., Substance issue): says has sponsor / and participates some in AA    Constitutional Health Concerns:       Review of Systems   Constitutional: Negative.    HENT: Negative.    Eyes: Negative.    Respiratory: Negative.    Cardiovascular: Negative.    Gastrointestinal: Negative.    Genitourinary:        Had sex dysfunction of zoloft ; such stopped    Musculoskeletal: Negative.    Skin: Negative.    Neurological: Negative.    Endo/Heme/Allergies: Negative.       Musculoskeletal: no tremor no stiffness     Laboratory Data  No visits with results within 1 Month(s) from this visit.   Latest known visit with results is:   Lab Visit " on 12/13/2018   Component Date Value Ref Range Status    Hemoglobin A1C 12/13/2018 5.4  4.0 - 5.6 % Final    Estimated Avg Glucose 12/13/2018 108  68 - 131 mg/dL Final    Cholesterol 12/13/2018 198  120 - 199 mg/dL Final    Triglycerides 12/13/2018 97  30 - 150 mg/dL Final    HDL 12/13/2018 58  40 - 75 mg/dL Final    LDL Cholesterol 12/13/2018 120.6  63.0 - 159.0 mg/dL Final    Hdl/Cholesterol Ratio 12/13/2018 29.3  20.0 - 50.0 % Final    Total Cholesterol/HDL Ratio 12/13/2018 3.4  2.0 - 5.0 Final    Non-HDL Cholesterol 12/13/2018 140  mg/dL Final    Sodium 12/13/2018 138  136 - 145 mmol/L Final    Potassium 12/13/2018 4.5  3.5 - 5.1 mmol/L Final    Chloride 12/13/2018 102  95 - 110 mmol/L Final    CO2 12/13/2018 29  23 - 29 mmol/L Final    Glucose 12/13/2018 93  70 - 110 mg/dL Final    BUN, Bld 12/13/2018 10  6 - 20 mg/dL Final    Creatinine 12/13/2018 1.3  0.5 - 1.4 mg/dL Final    Calcium 12/13/2018 10.2  8.7 - 10.5 mg/dL Final    Total Protein 12/13/2018 7.2  6.0 - 8.4 g/dL Final    Albumin 12/13/2018 4.1  3.5 - 5.2 g/dL Final    Total Bilirubin 12/13/2018 0.7  0.1 - 1.0 mg/dL Final    Alkaline Phosphatase 12/13/2018 78  55 - 135 U/L Final    AST 12/13/2018 24  10 - 40 U/L Final    ALT 12/13/2018 26  10 - 44 U/L Final    Anion Gap 12/13/2018 7* 8 - 16 mmol/L Final    eGFR if African American 12/13/2018 >60.0  >60 mL/min/1.73 m^2 Final    eGFR if non African American 12/13/2018 >60.0  >60 mL/min/1.73 m^2 Final    PSA, SCREEN 12/13/2018 1.3  0.00 - 4.00 ng/mL Final       Patient Active Problem List   Diagnosis    ADD (attention deficit disorder)    Bipolar affective    HTN (hypertension)    Alcohol abuse, in remission    Chews tobacco regularly    Mixed hyperlipidemia    Colon cancer screening    Special screening for malignant neoplasms, colon    Family history of colonic polyps    Bipolar 1 disorder, depressed    Social anxiety disorder    ADHD (attention deficit  hyperactivity disorder), combined type    Cocaine abuse; full remission x years    Nicotine dependence, chewing tobacco, uncomplicated    Insomnia          Current Outpatient Medications:     amLODIPine (NORVASC) 5 MG tablet, Take 1 tablet (5 mg total) by mouth once daily., Disp: 90 tablet, Rfl: 3    dextroamphetamine-amphetamine (ADDERALL XR) 30 MG 24 hr capsule, Take 1 capsule (30 mg total) by mouth every morning., Disp: 30 capsule, Rfl: 0    dextroamphetamine-amphetamine (ADDERALL XR) 30 MG 24 hr capsule, Take 1 capsule (30 mg total) by mouth every morning., Disp: 30 capsule, Rfl: 0    dextroamphetamine-amphetamine (ADDERALL XR) 30 MG 24 hr capsule, Take 1 capsule (30 mg total) by mouth every morning., Disp: 30 capsule, Rfl: 0    dextroamphetamine-amphetamine (ADDERALL XR) 30 MG 24 hr capsule, Take 1 capsule (30 mg total) by mouth every morning., Disp: 30 capsule, Rfl: 0    [START ON 10/14/2020] dextroamphetamine-amphetamine (ADDERALL XR) 30 MG 24 hr capsule, Take 1 capsule (30 mg total) by mouth every morning., Disp: 30 capsule, Rfl: 0    lamoTRIgine (LAMICTAL) 200 MG tablet, Take 1 tablet (200 mg total) by mouth once daily., Disp: 90 tablet, Rfl: 3    lisinopriL 10 MG tablet, Take 1 tablet (10 mg total) by mouth once daily., Disp: 90 tablet, Rfl: 3    mirtazapine (REMERON) 15 MG tablet, By mouth and at bed: 1/2 tab x 4 days THEN 1 FULL tab THEREAFTER, Disp: 30 tablet, Rfl: 2    mometasone (NASONEX) 50 mcg/actuation nasal spray, 2 sprays by Nasal route once daily., Disp: , Rfl: 5    zolpidem (AMBIEN) 5 MG Tab, Take 1 tablet (5 mg total) by mouth nightly as needed., Disp: 26 tablet, Rfl: 2    zolpidem (AMBIEN) 5 MG Tab, Take 1 tablet (5 mg total) by mouth nightly as needed (INSOMNIA)., Disp: 30 tablet, Rfl: 1     Social History     Tobacco Use   Smoking Status Former Smoker    Packs/day: 1.00    Years: 15.00    Pack years: 15.00    Quit date: 2009    Years since quittin.5   Smokeless  Tobacco Never Used        Review of patient's allergies indicates:   Allergen Reactions    Zoloft [sertraline] Other (See Comments)     Sex dysfunction (anorgasmia)      Impulse Control: no history SI / nor HI    Mental Status Exam:   Appearance: casual /   Oriented: x 3  Attitude: cooperative engaging pleasant   Eye Contact: good   Behavior: calm here   Mood: says Ok   Cognition: alert   Concentration: grossly intact   Affect: appropriate range   Anxiety: mild  moderate   Thought Process: goal directed   Speech: Volume : WNL   Quantity WNL   Quality: appears to openly answer questions   Eye Contact: good   Threats: no SI / HI   Memory: intact (as relay information across time spans) Pres: Trump ; prior: Obama   Psychosis: denies all   Estimate of Intellectual Function: average     ASSESSMENT:   Encounter Diagnoses   Name Primary?    Bipolar 1 disorder, depressed Yes    Social anxiety disorder     Alcohol abuse, in remission, sober 1 year 9-; in past 1/5 th a day      Cocaine abuse; full remission x years     ADHD (attention deficit hyperactivity disorder), combined type     Nicotine dependence, chewing tobacco, uncomplicated     Insomnia, unspecified type        PLAN:     Follow up IN CLINIC D Post MD Oct 19 or 20     offered social work though politely decline     Meds: has enough lamictal 200 mg; renewed ambien 5 mg  and adderall XR 30 mg; discussed pro / con: he opts for zoloft / added 50 mg / tapering up top such; stop if any problems     References: (reviewed with pt as well):    Anxiety &  phobia workbook by JULIANO Espitia PhD  (web retailers: used: $ 7-10)    Relaxation stress reduction workbook: ALFA Lal PhD ( used: $7-10)    Feeling Good Website: Kyree Sweeney MD / www.feelinggoHoodinn.mmCHANNEL website (free)     [place]: Path to Better Sleep : https://www.veterantraining.va.gov/insomnia/ (free)     [place] Quit with Us [place]: http://www.quitwithusla.org/  (and other resources as per counselor, if applicable)  "/ he uses chewing tobacco     Pt expressed appreciation for the visit today and did not have further question at this time though pt  was still informed to:     Call  if problems.    Call / Report Side Effects to Elisabeth SCHNEIDER     Encouraged to follow up with primary care / Gen Med MD for continued monitoring of general health and wellness.    Understanding was expressed; and no further concerns nor questions were raised at this time.     remember healthy self care:   eat right  attempt adequate rest   HANDWASHING / encourage such paris. During this corona virus time   walk or light exercise within reason and as your general med team approves  read or explore any of reference materials / homework mentioned  reach out (I.e.,  connect with)  others who nuture and bring out best in you  avoid risky behaviors  keep your appointments  IF you  cannot make your appt THEN please call or go online to reschedule.  avoid  alcohol and illicit substances.  Look for the positive.  All is often relative-seek balance  Call sooner if needed : [phone]   Call 911 or go to Emergency Room  (ER)  if any acute concerns      >>BACKGROUND <<     Andrei collazo 50 y.o. Bipolar (depressed)  male presents today as referred by [facility] Quereshi MD    Was seeing Carlos Barber MD for 8-10 yrs;he retired. 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 Dr Javed has continued on meds as from Dr Segovia    Pt has been maintained on:  on lamictal 200 mg (no rash)   adderall XR 30   ambien  5mg    Sober x 1 yr as of 9- via AA; prior to that 9 and 1/2 yr after relapsed    Says has sponsor    Says 'to be up front have had substance issues: alcohol and cocaine     Social anxiety ' big struggle for long time' ; more recently though time leave house; always struggle; people who not know think I outgoing; tho says push through it; makes feel present someone I am not    prior Juliette SCHNEIDER: tried paxil zoloft tho no clear recall of how did: interest to try /    Never abilify jack    Says he did Tried effexor XR (75 mg) difficult sex side effect;  Denies any suicidal ideation / nor gesture / nor any thoughts to harm other     No psychosis    Symptom Clusters:  Depressive Disorder: REPORTS depressed mood, irritable mood, appetite/weight change, sleep change, tired/fatigued, psychomotor change, worthlessness, concentration problems.   Anxiety Disorder: irritability, sleep problems, agoraphobia, excessive worry, avoidance symptoms.   Manic Disorder: REPORTS irritable mood, increased distractability, mixed with depression symptoms.   Psychotic Disorder: DENIES all.   Substance Use:  REPORTS alcohol, drugs, says as of 9- will be sober x 1 yr; in past: [facility]; in past up to 1/5 a day; aklso in past cocaine.   Physical or Sexual Abuse: DENIES all.       Self Rating Scales: (Such submitted for scanning) :     Quick Inventory of Depression (QID-Short Form):21  Zung Anxiety  "Index:78   Mood Disorder Questionnaire (MDQ): 8  The [place] Framework: a "bio-psycho-social" screening form for use as clinical raw data. / (submitted for scanning)      World Health Organization (W.H.O.) Adult ADHD Self Report Scale (ASRS-henrique 1.1):  5 of 6 core and 6 of 12 adjunctive    PAST PSYCHIATRIC HISTORY:     Inpatient: n  Outpatient: (incl. 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    When revert would isolate ; did for release ; drug choice was cocaine    Alcohol: last 9-8 2020;   Start: 10y  regular use: 12y UNTIL decaon (1st period sober) when left start back drinking  At 12y: every weekend: beer 6 12 oa  16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 / 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; when  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 at [facility] during week (2-6 beer)    Blackouts: deneis   No seizure:    Relationships: when drink / isolate / fear of work consequence IF they learned     Was drinking 5th a day (3 yr ago; "drank hard = 5th a day"); until passed out; had get up 4 a or 4;30a ; in fear breathalizer; combat by get [employer]     After deacon daily and coacine    Went rehab: [facility] 1 month 2008 ; 2 months prior: in [facility] [place]: 21 days (out pocket private); none other    When went [place], self report drugs and alcohol; they said do not worry; when got back ; was sent psychiatrist tho said lied on questionairre as he said not drinknig so terminated    2 and 1/2 yrs later: he contact [facility] ; " says was wrongfully terminated as self report; go job back    Says for 1st 2 y back weekly drug test and random; and employer said go see psyc MD and go to counselor ; NOW just random.    Cannabis: 5th grade 1st  Try; 8th grade : at least 1x week; daily use: 16y until 18y then when came across    Tobacco:former ; stopped 2008 / start 21 yrs olf  Cocaine:years ago   Benzo: n  Opioid: n  Ecstasy:n  Other:n    Family History:  Family History   Problem Relation Age of Onset    Hypertension Mother     Hypertension Father     Diabetes Father     Heart disease Paternal Grandmother       Grandparent: Drug problems brother drug problem   Parents uncles / depression   Daughter depression    Mother and aunt history significant anxiety     3 wishes? :  Serenity, comfortable in skin , (20y) daughter not have seizures or migranes     PSYCHO-SOCIAL DEVELOPMENT HISTORY:     [facility]    Siblings (full or half)  Brothers: 1 older 2 1/2   Sisters: none    Parents : alive     Briefly Describe  your Mom: poor health controlling   Briefly Describe your Dad: poor health unhappy anxious    Bio Mom: Occupation:    Bio Dad:  Occupation: car sales     Marital Status:  / x2 to same lady (15 y / 1995); 2 1/2 y [place] ([place], [place]) ; speak frequently ; she RN school     Children   Girls  (ages): 1 daughter 20 y: seizures migraines vertigo / depression anxiety / [employer] / 2nd yr journalism  Boys (ages):     Physical Abuse: N    Sexual abuse  N    Other trauma / abuse? 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 n

## 2020-10-02 ENCOUNTER — PATIENT MESSAGE (OUTPATIENT)
Dept: PSYCHIATRY | Facility: CLINIC | Age: 50
End: 2020-10-02

## 2020-10-03 ENCOUNTER — TELEPHONE (OUTPATIENT)
Dept: PSYCHIATRY | Facility: CLINIC | Age: 50
End: 2020-10-03

## 2020-10-03 NOTE — TELEPHONE ENCOUNTER
"Saturday: 17:15pm    Called pt  As  relates to his message sent to my in box:    Per his message:  very disoriented, tired and uncontrollably hungry.    Can I go back to Sertraline?     No answer tho I did leave message     That I will again attempt a return call tomorrow or Monday    Would like to discuss Abilify     In interim stop Remeron.    He did not have a reaction "per se" to zoloft; tho did cite sex dysfunction.    As such: in interim he can revert to zoloft as prior on hand tho will plan to look at alternatives when next talk.    As noted:  IF acute concerns: Call 911 or go to ER.    D Post MD  "

## 2020-10-04 DIAGNOSIS — F31.9 BIPOLAR 1 DISORDER, DEPRESSED: Primary | ICD-10-CM

## 2020-10-04 RX ORDER — ARIPIPRAZOLE 2 MG/1
2 TABLET ORAL DAILY
Qty: 30 TABLET | Refills: 2 | Status: SHIPPED | OUTPATIENT
Start: 2020-10-04 | End: 2020-10-19

## 2020-10-04 NOTE — PROGRESS NOTES
Sunday: 3:30p    10-4-2020:    Called Mr Casanova,    He stopped Remeron; as well a Zoloft    Discussed pro / con of Abilify ; including termor stiffness blood sugar wt     he agrees to Rx of such;     Order placed for Abilify 2 mg daily    He has appt for follow up 10- @ 9am    Told contact sooner if problems Understanding Expressed    He thanked me for calling.    D Miller SCHNEIDER

## 2020-10-19 ENCOUNTER — OFFICE VISIT (OUTPATIENT)
Dept: PSYCHIATRY | Facility: CLINIC | Age: 50
End: 2020-10-19
Payer: COMMERCIAL

## 2020-10-19 VITALS
HEART RATE: 88 BPM | WEIGHT: 205 LBS | BODY MASS INDEX: 29.42 KG/M2 | SYSTOLIC BLOOD PRESSURE: 123 MMHG | DIASTOLIC BLOOD PRESSURE: 86 MMHG

## 2020-10-19 DIAGNOSIS — G47.00 INSOMNIA, UNSPECIFIED TYPE: ICD-10-CM

## 2020-10-19 DIAGNOSIS — F14.10 COCAINE ABUSE: ICD-10-CM

## 2020-10-19 DIAGNOSIS — F90.2 ADHD (ATTENTION DEFICIT HYPERACTIVITY DISORDER), COMBINED TYPE: ICD-10-CM

## 2020-10-19 DIAGNOSIS — F10.11 ALCOHOL ABUSE, IN REMISSION: ICD-10-CM

## 2020-10-19 DIAGNOSIS — F31.9 BIPOLAR 1 DISORDER, DEPRESSED: Primary | ICD-10-CM

## 2020-10-19 DIAGNOSIS — F40.10 SOCIAL ANXIETY DISORDER: ICD-10-CM

## 2020-10-19 DIAGNOSIS — F17.220 NICOTINE DEPENDENCE, CHEWING TOBACCO, UNCOMPLICATED: ICD-10-CM

## 2020-10-19 PROCEDURE — 99999 PR PBB SHADOW E&M-EST. PATIENT-LVL II: ICD-10-PCS | Mod: PBBFAC,,, | Performed by: PSYCHIATRY & NEUROLOGY

## 2020-10-19 PROCEDURE — 99214 OFFICE O/P EST MOD 30 MIN: CPT | Mod: S$GLB,,, | Performed by: PSYCHIATRY & NEUROLOGY

## 2020-10-19 PROCEDURE — 99214 PR OFFICE/OUTPT VISIT, EST, LEVL IV, 30-39 MIN: ICD-10-PCS | Mod: S$GLB,,, | Performed by: PSYCHIATRY & NEUROLOGY

## 2020-10-19 PROCEDURE — 99999 PR PBB SHADOW E&M-EST. PATIENT-LVL II: CPT | Mod: PBBFAC,,, | Performed by: PSYCHIATRY & NEUROLOGY

## 2020-10-19 RX ORDER — ZOLPIDEM TARTRATE 5 MG/1
5 TABLET ORAL NIGHTLY PRN
Qty: 30 TABLET | Refills: 1 | Status: SHIPPED | OUTPATIENT
Start: 2020-11-13 | End: 2020-11-30

## 2020-10-19 RX ORDER — LAMOTRIGINE 200 MG/1
200 TABLET ORAL DAILY
Qty: 30 TABLET | Refills: 2 | Status: SHIPPED | OUTPATIENT
Start: 2020-10-19 | End: 2020-11-30 | Stop reason: SDUPTHER

## 2020-10-19 RX ORDER — ARIPIPRAZOLE 5 MG/1
5 TABLET ORAL DAILY
Qty: 30 TABLET | Refills: 2 | Status: SHIPPED | OUTPATIENT
Start: 2020-10-19 | End: 2020-11-30 | Stop reason: SDUPTHER

## 2020-10-19 RX ORDER — DEXTROAMPHETAMINE SACCHARATE, AMPHETAMINE ASPARTATE MONOHYDRATE, DEXTROAMPHETAMINE SULFATE AND AMPHETAMINE SULFATE 7.5; 7.5; 7.5; 7.5 MG/1; MG/1; MG/1; MG/1
30 CAPSULE, EXTENDED RELEASE ORAL EVERY MORNING
Qty: 30 CAPSULE | Refills: 0 | Status: SHIPPED | OUTPATIENT
Start: 2020-11-14 | End: 2020-11-30

## 2020-10-19 RX ORDER — DEXTROAMPHETAMINE SACCHARATE, AMPHETAMINE ASPARTATE MONOHYDRATE, DEXTROAMPHETAMINE SULFATE AND AMPHETAMINE SULFATE 7.5; 7.5; 7.5; 7.5 MG/1; MG/1; MG/1; MG/1
30 CAPSULE, EXTENDED RELEASE ORAL EVERY MORNING
Qty: 30 CAPSULE | Refills: 0 | Status: SHIPPED | OUTPATIENT
Start: 2020-12-12 | End: 2020-11-30

## 2020-10-19 NOTE — PATIENT INSTRUCTIONS
PLAN:     Please call  [facility] at [phone] and  arrange your FOLLOW UP TELEHEALTH (video)  appointment  with YUAN Barber MD for: Nov 30     Meds: see aftervisit summary     Pt expressed appreciation for the visit today and did not have further question at this time though pt  was still informed to:     Call  if problems.    Call / Report Side Effects to Elisabeth SCHNEIDER     Encouraged to follow up with primary care / Gen Med MD for continued monitoring of general health and wellness.    Understanding was expressed; and no further concerns nor questions were raised at this time.     remember healthy self care:   eat right  attempt adequate rest   HANDWASHING / encourage such paris. During this corona virus time   walk or light exercise within reason and as your general med team approves  read or explore any of reference materials / homework mentioned  reach out (I.e.,  connect with)  others who nuture and bring out best in you  avoid risky behaviors  keep your appointments  IF you  cannot make your appt THEN please call or go online to reschedule.  avoid  alcohol and illicit substances.  Look for the positive.  All is often relative-seek balance  Call sooner if needed : [phone]   Call 911 or go to Emergency Room  (ER)  if any acute concerns

## 2020-10-19 NOTE — PROGRESS NOTES
"      Timeframe: Corona Virus Outbreak     Andrei Casanova   1970   10/19/2020     Disclaimer: Evaluation and treatment is based on information presented to date. Any new information may affect assessment and findings.     Location: IN CLINIC     Who (in attendance) :  Pt himself       S: Patient's Own Perception of Condition (& Side Effects) :     likes abilify / feels better    perhaps wt gain 4 pounds ; wants remain and go up ; discussed saphris     O:     Vitals:    10/19/20 0901   BP: 123/86   Pulse: 88        CURRENT PRESENTATION:     zoloft helped anxiety / tho sexual dysfuinction > D/C Zoloft / added as allergy (intolerant): anorgasmia    Safety:no SI / no HI     Supports: enjoys his daughter ; she graduated in  [employer] recently; (he is concerned about her seizure history)     Work / School     Activities tends like more solitarty activities    Medication Adjustments / Renewals:     spent majority time  / re: med mngt and alternatives to zoloft ;  Has tried / failed wellbutrin / not like way felt  tried zoloft / tho  sex dysfunction  tried remeron / not like way felt   Likes abilify ; desires move to 5 mg  discussed adderall XR options ; offerred immed release tho after review such ; opts to leave as was last month     Counselor/ Coping Skills / Counseling : offerrerd tho politely declines ; aware can revisit    Staying "Sober / "clean"  (I.e., Substance issue): says has sponsor / and participates some in AA    Constitutional Health Concerns:       Review of Systems   Constitutional: Negative.    HENT: Negative.    Eyes: Negative.    Respiratory: Negative.    Cardiovascular: Negative.    Gastrointestinal: Negative.    Genitourinary:        Had sex dysfunction of zoloft ; such stopped    Musculoskeletal: Negative.    Skin: Negative.    Neurological: Negative.    Endo/Heme/Allergies: Negative.       Musculoskeletal: no tremor no stiffness       Patient Active Problem List   Diagnosis    ADD (attention " deficit disorder)    Bipolar affective    HTN (hypertension)    Alcohol abuse, in remission    Chews tobacco regularly    Mixed hyperlipidemia    Colon cancer screening    Special screening for malignant neoplasms, colon    Family history of colonic polyps    Bipolar 1 disorder, depressed    Social anxiety disorder    ADHD (attention deficit hyperactivity disorder), combined type    Cocaine abuse; full remission x years    Nicotine dependence, chewing tobacco, uncomplicated    Insomnia          Current Outpatient Medications:     amLODIPine (NORVASC) 5 MG tablet, Take 1 tablet (5 mg total) by mouth once daily., Disp: 90 tablet, Rfl: 3    ARIPiprazole (ABILIFY) 5 MG Tab, Take 1 tablet (5 mg total) by mouth once daily., Disp: 30 tablet, Rfl: 2    [START ON 2020] dextroamphetamine-amphetamine (ADDERALL XR) 30 MG 24 hr capsule, Take 1 capsule (30 mg total) by mouth every morning., Disp: 30 capsule, Rfl: 0    [START ON 2020] dextroamphetamine-amphetamine (ADDERALL XR) 30 MG 24 hr capsule, Take 1 capsule (30 mg total) by mouth every morning., Disp: 30 capsule, Rfl: 0    lamoTRIgine (LAMICTAL) 200 MG tablet, Take 1 tablet (200 mg total) by mouth once daily. STOP all Lamictal IF any RASH and tell Psyc MD, Disp: 30 tablet, Rfl: 2    lisinopriL 10 MG tablet, Take 1 tablet (10 mg total) by mouth once daily., Disp: 90 tablet, Rfl: 3    mometasone (NASONEX) 50 mcg/actuation nasal spray, 2 sprays by Nasal route once daily., Disp: , Rfl: 5    [START ON 2020] zolpidem (AMBIEN) 5 MG Tab, Take 1 tablet (5 mg total) by mouth nightly as needed (INSOMNIA)., Disp: 30 tablet, Rfl: 1     Social History     Tobacco Use   Smoking Status Former Smoker    Packs/day: 1.00    Years: 15.00    Pack years: 15.00    Quit date: 2009    Years since quittin.6   Smokeless Tobacco Never Used        Review of patient's allergies indicates:   Allergen Reactions    Remeron [mirtazapine] Other (See  Comments)     Increased hunger and felt disoriented    Zoloft [sertraline] Other (See Comments)     Sex dysfunction (anorgasmia)      Impulse Control: no history SI / nor HI    Mental Status Exam:   Appearance: casual   Oriented: x 3  Attitude: cooperative pleasant   Eye Contact: good   Behavior: calm here   Mood: says Ok   Cognition: alert   Concentration: grossly intact   Affect: appropriate range   Anxiety: mild  moderate   Thought Process: goal directed   Speech: Volume : WNL   Quantity WNL   Quality: appears to openly answer questions   Eye Contact: good   Threats: no SI / HI   Psychosis: denies all   Estimate of Intellectual Function: above average     ASSESSMENT:   Encounter Diagnoses   Name Primary?    Bipolar 1 disorder, depressed Yes    ADHD (attention deficit hyperactivity disorder), combined type     Social anxiety disorder     Insomnia, unspecified type     Nicotine dependence, chewing tobacco, uncomplicated     Alcohol abuse, in REMISSION sober 1 year 9-; in past 1/5 th a day      Cocaine abuse; full REMISSION x years        PLAN:     Follow up IN CLINIC D Post MD  11- at 8;30a    offered social work though politely decline     Meds: see after visit summary     References: (reviewed with pt as well):    Anxiety &  phobia workbook by JULIANO Espitia PhD  (web retailers: used: $ 7-10)    Relaxation stress reduction workbook: ALFA Lal PhD ( used: $7-10)    Feeling Good Website: Kyree Sweeney MD / [email or web address] website (free)     [place]: Path to Better Sleep : https://www.veterantraining.[place].gov/insomnia/ (free)     [place] Quit with Us [place]: http://www.quitwithusla.org/  (and other resources as per counselor, if applicable) / he uses chewing tobacco     Pt expressed appreciation for the visit today and did not have further question at this time though pt  was still informed to:     Call  if problems.    Call / Report Side Effects to Psyc MD     Encouraged to follow up with primary care  "/ Gen Med MD for continued monitoring of general health and wellness.    Understanding was expressed; and no further concerns nor questions were raised at this time.     remember healthy self care:   eat right  attempt adequate rest   HANDWASHING / encourage such [place]. During this corona virus time   walk or light exercise within reason and as your general med team approves  read or explore any of reference materials / homework mentioned  reach out (I.e.,  connect with)  others who nuture and bring out best in you  avoid risky behaviors  keep your appointments  IF you  cannot make your appt THEN please call or go online to reschedule.  avoid  alcohol and illicit substances.  Look for the positive.  All is often relative-seek balance  Call sooner if needed : [phone]   Call 911 or go to Emergency Room  (ER)  if any acute concerns      >>BACKGROUND <<     Andrei collazo 50 y.o. Bipolar (depressed)  male presents today as referred by [facility] Quereshi MD    Was seeing Carlos Barber MD for 8-10 yrs;he retired. 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 Dr Javed has continued on meds as from Dr Segovia    Pt has been maintained on:  on lamictal 200 mg (no rash)   adderall XR 30   ambien  5mg    Sober x 1 yr as of 9- via AA; prior to that 9 and 1/2 yr after relapsed    Says has sponsor    Says 'to be up front have had substance issues: alcohol and cocaine     Social anxiety ' big struggle for long time' ; more recently though time leave house; always struggle; people who not know think I outgoing; tho says push through it; makes feel present someone I am not    prior Juliette SCHNEIDER: tried paxil zoloft tho no clear recall of how did: interest to try /    Never abilify jack    Says he did Tried effexor XR (75 mg) difficult sex side effect;  Denies any suicidal ideation / nor gesture / nor any thoughts to harm other     No psychosis    Symptom Clusters:  Depressive Disorder: REPORTS depressed mood, irritable mood, appetite/weight change, sleep change, tired/fatigued, psychomotor change, worthlessness, concentration problems.   Anxiety Disorder: irritability, sleep problems, agoraphobia, excessive worry, avoidance symptoms.   Manic Disorder: REPORTS irritable mood, increased distractability, mixed with depression symptoms.   Psychotic Disorder: DENIES all.   Substance Use:  REPORTS alcohol, drugs, says as of 9- will be sober x 1 yr; in past: [facility]; in past up to 1/5 a day; aklso in past cocaine.   Physical or Sexual Abuse: DENIES all.       Self Rating Scales: (Such submitted for scanning) :     Quick Inventory of Depression (QID-Short Form):21  Zung Anxiety Index:78   Mood Disorder Questionnaire (MDQ): 8  The [place] Framework: a "bio-psycho-social" screening form for use as clinical raw data. / (submitted for scanning)      World Health Organization (W.H.O.) Adult ADHD Self Report Scale (ASRS-henrique 1.1):  5 of 6 core and " "6 of 12 adjunctive    PAST PSYCHIATRIC HISTORY:     Inpatient: n  Outpatient: (incl. 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    When revert would isolate ; did for release ; drug choice was cocaine    Alcohol: last 9-8 2020;   Start: 10y  regular use: 12y UNTIL decaon (1st period sober) when left start back drinking  At 12y: every weekend: beer 6 12 oa  16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 / 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; when  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 at [facility] during week (2-6 beer)    Blackouts: deneis   No seizure:    Relationships: when drink / isolate / fear of work consequence IF they learned     Was drinking 5th a day (3 yr ago; "drank hard = 5th a day"); until passed out; had get up 4 a or 4;30a ; in fear breathalizer; combat by get [employer]     After deacon daily and coacine    Went rehab: [facility] 1 month 2008 ; 2 months prior: in [facility]: 21 days (out pocket private); none other    When went [place], self report drugs and alcohol; they said do not worry; when got back ; was sent psychiatrist pradip said lied on questionairre as he said not drinknig so terminated    2 and 1/2 yrs later: he contact [facility] ; says was wrongfully terminated as self report; go job back    Says for 1st 2 y back weekly drug test and random; and employer said go see victoriano SCHNEIDER and go to counselor ; NOW just random.    Cannabis: 5th grade 1st  Try; 8th grade : at least 1x week; daily use: 16y until 18y " then when came across    Tobacco:former ; stopped 2008 / start 21 yrs olf  Cocaine:years ago   Benzo: n  Opioid: n  Ecstasy:n  Other:n    Family History:  Family History   Problem Relation Age of Onset    Hypertension Mother     Hypertension Father     Diabetes Father     Heart disease Paternal Grandmother       Grandparent: Drug problems brother drug problem   Parents uncles / depression   Daughter depression    Mother and aunt history significant anxiety     3 wishes? :  Serenity, comfortable in skin , (20y) daughter not have seizures or migranes     PSYCHO-SOCIAL DEVELOPMENT HISTORY:     City Born: [facility]    Siblings (full or half)  Brothers: 1 older 2 1/2   Sisters: none    Parents : alive     Briefly Describe  your Mom: poor health controlling   Briefly Describe your Dad: poor health unhappy anxious    Bio Mom: Occupation:    Bio Dad:  Occupation: car sales     Marital Status:  / x2 to same lady (15 y / 1995); 2 1/2 y [place] ([place], [place]) ; speak frequently ; she RN school     Children   Girls  (ages): 1 daughter 20 y: seizures migraines vertigo / depression anxiety / [employer] / 2nd yr journalism  Boys (ages):     Physical Abuse: N    Sexual abuse  N    Other trauma / abuse? 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 n

## 2020-11-08 ENCOUNTER — PATIENT MESSAGE (OUTPATIENT)
Dept: PSYCHIATRY | Facility: CLINIC | Age: 50
End: 2020-11-08

## 2020-11-13 ENCOUNTER — TELEPHONE (OUTPATIENT)
Dept: PSYCHIATRY | Facility: CLINIC | Age: 50
End: 2020-11-13

## 2020-11-30 ENCOUNTER — OFFICE VISIT (OUTPATIENT)
Dept: PSYCHIATRY | Facility: CLINIC | Age: 50
End: 2020-11-30
Payer: COMMERCIAL

## 2020-11-30 VITALS
BODY MASS INDEX: 29.86 KG/M2 | SYSTOLIC BLOOD PRESSURE: 127 MMHG | DIASTOLIC BLOOD PRESSURE: 87 MMHG | WEIGHT: 208.13 LBS | HEART RATE: 80 BPM

## 2020-11-30 DIAGNOSIS — F17.220 NICOTINE DEPENDENCE, CHEWING TOBACCO, UNCOMPLICATED: ICD-10-CM

## 2020-11-30 DIAGNOSIS — F10.11 ALCOHOL ABUSE, IN REMISSION: ICD-10-CM

## 2020-11-30 DIAGNOSIS — G47.00 INSOMNIA, UNSPECIFIED TYPE: ICD-10-CM

## 2020-11-30 DIAGNOSIS — F14.10 COCAINE ABUSE: ICD-10-CM

## 2020-11-30 DIAGNOSIS — F31.9 BIPOLAR 1 DISORDER, DEPRESSED: Primary | ICD-10-CM

## 2020-11-30 DIAGNOSIS — F90.2 ADHD (ATTENTION DEFICIT HYPERACTIVITY DISORDER), COMBINED TYPE: ICD-10-CM

## 2020-11-30 DIAGNOSIS — F40.10 SOCIAL ANXIETY DISORDER: ICD-10-CM

## 2020-11-30 PROCEDURE — 99999 PR PBB SHADOW E&M-EST. PATIENT-LVL II: CPT | Mod: PBBFAC,,, | Performed by: PSYCHIATRY & NEUROLOGY

## 2020-11-30 PROCEDURE — 99214 OFFICE O/P EST MOD 30 MIN: CPT | Mod: S$GLB,,, | Performed by: PSYCHIATRY & NEUROLOGY

## 2020-11-30 PROCEDURE — 99999 PR PBB SHADOW E&M-EST. PATIENT-LVL II: ICD-10-PCS | Mod: PBBFAC,,, | Performed by: PSYCHIATRY & NEUROLOGY

## 2020-11-30 PROCEDURE — 99214 PR OFFICE/OUTPT VISIT, EST, LEVL IV, 30-39 MIN: ICD-10-PCS | Mod: S$GLB,,, | Performed by: PSYCHIATRY & NEUROLOGY

## 2020-11-30 RX ORDER — DEXTROAMPHETAMINE SACCHARATE, AMPHETAMINE ASPARTATE, DEXTROAMPHETAMINE SULFATE AND AMPHETAMINE SULFATE 7.5; 7.5; 7.5; 7.5 MG/1; MG/1; MG/1; MG/1
30 TABLET ORAL 2 TIMES DAILY
Qty: 60 TABLET | Refills: 0 | Status: SHIPPED | OUTPATIENT
Start: 2020-11-30 | End: 2020-12-05

## 2020-11-30 RX ORDER — ARIPIPRAZOLE 5 MG/1
5 TABLET ORAL DAILY
Qty: 30 TABLET | Refills: 2 | Status: SHIPPED | OUTPATIENT
Start: 2020-11-30 | End: 2021-01-19 | Stop reason: SDUPTHER

## 2020-11-30 RX ORDER — LAMOTRIGINE 200 MG/1
200 TABLET ORAL DAILY
Qty: 30 TABLET | Refills: 2 | Status: SHIPPED | OUTPATIENT
Start: 2020-11-30 | End: 2021-01-19 | Stop reason: SDUPTHER

## 2020-11-30 RX ORDER — DEXTROAMPHETAMINE SACCHARATE, AMPHETAMINE ASPARTATE, DEXTROAMPHETAMINE SULFATE AND AMPHETAMINE SULFATE 7.5; 7.5; 7.5; 7.5 MG/1; MG/1; MG/1; MG/1
30 TABLET ORAL 2 TIMES DAILY
Qty: 60 TABLET | Refills: 0 | Status: SHIPPED | OUTPATIENT
Start: 2020-11-30 | End: 2020-12-05 | Stop reason: SDUPTHER

## 2020-11-30 RX ORDER — ZOLPIDEM TARTRATE 10 MG/1
10 TABLET ORAL NIGHTLY PRN
Qty: 30 TABLET | Refills: 2 | Status: SHIPPED | OUTPATIENT
Start: 2020-11-30 | End: 2021-01-19 | Stop reason: SDUPTHER

## 2020-11-30 NOTE — PATIENT INSTRUCTIONS
PLAN:     Please call  [facility] at [phone] and  arrange your FOLLOW UP IN CLINIC   appointment  with YUAN Barber MD for:Jan 11    Meds: see aftervisit summary / note advanced Adderall to 30 mg twice daily (based upon Mr Casanova saying was on x years and did well at that dose    References: (reviewed with pt as well):    Anxiety &  phobia workbook by JULIANO Espitia PhD  (web retailers: used: $ 7-10)    Relaxation stress reduction workbook: ALFA Lal PhD ( used: $7-10)    Feeling Good Website: Kyree Sweeney MD / [email or web address] website (free) PODCASTS    [place]: Path to Better Sleep : https://www.veterantraining.[place].gov/insomnia/ (free)     Pt expressed appreciation for the visit today and did not have further question at this time though pt  was still informed to:     Call  if problems.    Call / Report Side Effects to Elisabeth SCHNEIDER     Encouraged to follow up with primary care / Gen Med MD for continued monitoring of general health and wellness.    Understanding was expressed; and no further concerns nor questions were raised at this time.     remember healthy self care:   eat right  attempt adequate rest   HANDWASHING / encourage such [place]. During this corona virus time   walk or light exercise within reason and as your general med team approves  read or explore any of reference materials / homework mentioned  reach out (I.e.,  connect with)  others who nuture and bring out best in you  avoid risky behaviors  keep your appointments  IF you  cannot make your appt THEN please call or go online to reschedule.  avoid  alcohol and illicit substances.  Look for the positive.  All is often relative-seek balance  Call sooner if needed : [phone]   Call 911 or go to Emergency Room  (ER)  if any acute concerns

## 2020-11-30 NOTE — PROGRESS NOTES
"  Timeframe: Corona Virus Outbreak     Andrei Casanova   1970 11/30/2020     Disclaimer: Evaluation and treatment is based on information presented to date. Any new information may affect assessment and findings.     Location: IN CLINIC     Who (in attendance) :  Pt himself     S: Patient's Own Perception of Condition (& Side Effects) : says no weight gain tho still has insomnia ; says ambien helped tho stopped working; offerred / and discuss pro con move to 10 mg ; he would like such; also discussed potential role stimulant /re: insomnia / he aware tho does start early and last dose should be out system    O:     Vitals:    11/30/20 0825   BP: 127/87   Pulse: 80        likes abilify / feels better    Last session: wt gain 4 pounds ; wants remain at 5 mg; says weight stabilized     discussed saphris / tho he desires remain abilify ; no tremor; mood good    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; he had gone there himself in past; he remains sober    CURRENT PRESENTATION:     zoloft helped anxiety / tho sexual dysfuinction > D/C Zoloft / added as allergy (intolerant): anorgasmia    Safety:no SI / no HI     Supports: enjoys his daughter ; she graduated in  journalism recently; (he is concerned about her seizure history)     Work / School     Activities tends like more solitarty activities    Medication Adjustments / Renewals:     spent majority time  / re: med mngt and alternatives to zoloft ;  Has tried / failed wellbutrin / not like way felt  tried zoloft / tho  sex dysfunction  tried remeron / not like way felt   Likes abilify ; desires move to 5 mg  discussed adderall XR options ; offerred immed release tho after review such ; opts to leave as was last month     Counselor/ Coping Skills / Counseling : offerrerd tho politely declines ; aware can revisit    Staying "Sober / "clean"  (I.e., Substance issue): says has sponsor / and participates some in AA    Constitutional Health Concerns: "       Review of Systems   Constitutional: Negative.    HENT: Negative.    Eyes: Negative.    Respiratory: Negative.    Cardiovascular: Negative.    Gastrointestinal: Negative.    Genitourinary:        Had sex dysfunction of zoloft ; such stopped    Musculoskeletal: Negative.    Skin: Negative.    Neurological: Negative.    Endo/Heme/Allergies: Negative.       Musculoskeletal: no tremor no stiffness       Patient Active Problem List   Diagnosis    ADD (attention deficit disorder)    Bipolar affective    HTN (hypertension)    Alcohol abuse, in remission    Chews tobacco regularly    Mixed hyperlipidemia    Colon cancer screening    Special screening for malignant neoplasms, colon    Family history of colonic polyps    Bipolar 1 disorder, depressed    Social anxiety disorder    ADHD (attention deficit hyperactivity disorder), combined type    Cocaine abuse; full remission x years    Nicotine dependence, chewing tobacco, uncomplicated    Insomnia          Current Outpatient Medications:     amLODIPine (NORVASC) 5 MG tablet, Take 1 tablet (5 mg total) by mouth once daily., Disp: 90 tablet, Rfl: 3    ARIPiprazole (ABILIFY) 5 MG Tab, Take 1 tablet (5 mg total) by mouth once daily., Disp: 30 tablet, Rfl: 2    dextroamphetamine-amphetamine (ADDERALL) 30 mg Tab, Take 1 tablet (30 mg total) by mouth 2 (two) times a day., Disp: 60 tablet, Rfl: 0    dextroamphetamine-amphetamine (ADDERALL) 30 mg Tab, Take 1 tablet (30 mg total) by mouth 2 (two) times a day., Disp: 60 tablet, Rfl: 0    lamoTRIgine (LAMICTAL) 200 MG tablet, Take 1 tablet (200 mg total) by mouth once daily. STOP all Lamictal IF any RASH and tell Psyc MD, Disp: 30 tablet, Rfl: 2    lisinopriL 10 MG tablet, Take 1 tablet (10 mg total) by mouth once daily., Disp: 90 tablet, Rfl: 3    mometasone (NASONEX) 50 mcg/actuation nasal spray, 2 sprays by Nasal route once daily., Disp: , Rfl: 5    zolpidem (AMBIEN) 10 mg Tab, Take 1 tablet (10 mg total)  by mouth nightly as needed (INSOMNIA)., Disp: 30 tablet, Rfl: 2     Social History     Tobacco Use   Smoking Status Former Smoker    Packs/day: 1.00    Years: 15.00    Pack years: 15.00    Quit date: 2009    Years since quittin.7   Smokeless Tobacco Never Used        Review of patient's allergies indicates:   Allergen Reactions    Remeron [mirtazapine] Other (See Comments)     Increased hunger and felt disoriented    Zoloft [sertraline] Other (See Comments)     Sex dysfunction (anorgasmia)      Impulse Control: no history SI / nor HI    Mental Status Exam:   Appearance: casual   Oriented: x 3  Attitude: cooperative pleasant   Eye Contact: good   Behavior: calm here   Mood: says Ok   Cognition: alert   Concentration: grossly intact   Affect: appropriate range   Anxiety: mild  moderate   Thought Process: goal directed   Speech: Volume : WNL   Quantity WNL   Quality: appears to openly answer questions   Eye Contact: good   Threats: no SI / HI   Psychosis: denies all   Estimate of Intellectual Function: above average     ASSESSMENT:   Encounter Diagnoses   Name Primary?    Bipolar 1 disorder, depressed Yes    ADHD (attention deficit hyperactivity disorder), combined type     Social anxiety disorder     Insomnia, unspecified type     Cocaine abuse; full REMISSION x years     Nicotine dependence, chewing tobacco, uncomplicated     Alcohol abuse, in REMISSION sober 1 year 2020; in past  day           PLAN:     Please call  [facility] at [phone] and  arrange your FOLLOW UP IN CLINIC   appointment  with YUAN Barber MD for: as he schedules with     He politely  declined SW     Meds: see aftervisit summary / note advanced Adderall to 30 mg twice daily (based upon Mr Casanova saying was on x years and did well at that dose    References: (reviewed with pt as well):    Anxiety &  phobia workbook by JULIANO Espitia PhD  (web retailers: used: $ 7-10)    Relaxation  "stress reduction workbook: ALFA Lal PhD ( used: $7-10)    Feeling Good Website: Kyree Sweeney MD / [email or web address] website (free) PODCASTS    [place]: Path to Better Sleep : https://www.veterantraining.va.gov/insomnia/ (free)     Pt expressed appreciation for the visit today and did not have further question at this time though pt  was still informed to:     Call  if problems.    Call / Report Side Effects to Elisabeth SCHNEIDER     Encouraged to follow up with primary care / Gen Med MD for continued monitoring of general health and wellness.    Understanding was expressed; and no further concerns nor questions were raised at this time.     remember healthy self care:   eat right  attempt adequate rest   HANDWASHING / encourage such paris. During this corona virus time   walk or light exercise within reason and as your general med team approves  read or explore any of reference materials / homework mentioned  reach out (I.e.,  connect with)  others who nuture and bring out best in you  avoid risky behaviors  keep your appointments  IF you  cannot make your appt THEN please call or go online to reschedule.  avoid  alcohol and illicit substances.  Look for the positive.  All is often relative-seek balance  Call sooner if needed : [phone]   Call 911 or go to Emergency Room  (ER)  if any acute concerns          >>BACKGROUND <<     Andrei collazo 50 y.o. Bipolar (depressed)  male presents today as referred by [facility] MD    Was seeing Carlos Barber MD for 8-10 yrs;he retired. 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 Dr Javed has continued on meds as from Dr Segovia    Pt has been maintained on:  on lamictal 200 mg (no rash)   adderall XR 30   ambien  5mg    Sober x 1 yr as of 9- via AA; prior to that 9 and 1/2 yr after relapsed    Says has sponsor    Says 'to be up front have had substance issues: alcohol and cocaine     Social anxiety ' big struggle for long time' ; more recently though time leave house; always struggle; people who not know think I outgoing; tho says push through it; makes feel present someone I am not    prior Juliette SCHNEIDER: tried paxil zoloft tho no clear recall of how did: interest to try /    Never abilify jack    Says he did Tried effexor XR (75 mg) difficult sex side effect;  Denies any suicidal ideation / nor gesture / nor any thoughts to harm other     No psychosis    Symptom Clusters:  Depressive Disorder: REPORTS depressed mood, irritable mood, appetite/weight change, sleep change, tired/fatigued, psychomotor change, worthlessness, concentration problems.   Anxiety Disorder: irritability, sleep problems, agoraphobia, excessive worry, avoidance symptoms.   Manic Disorder: REPORTS irritable mood, increased distractability, mixed with depression symptoms.   Psychotic Disorder: DENIES all.   Substance Use:  REPORTS alcohol, drugs, says as of 9- will be sober x 1 yr; in past: [facility];  "in past up to 1/5 a day; aklso in past cocaine.   Physical or Sexual Abuse: DENIES all.       Self Rating Scales: (Such submitted for scanning) :     Quick Inventory of Depression (QID-Short Form):21  Zung Anxiety Index:78   Mood Disorder Questionnaire (MDQ): 8  The [place] Framework: a "bio-psycho-social" screening form for use as clinical raw data. / (submitted for scanning)      World Health Organization (W.H.O.) Adult ADHD Self Report Scale (ASRS-henrique 1.1):  5 of 6 core and 6 of 12 adjunctive    PAST PSYCHIATRIC HISTORY:     Inpatient: n  Outpatient: (incl. Primary Care): bradley Barber MD      Past Surgical History:   Procedure Laterality Date    APPENDECTOMY      COLONOSCOPY N/A 2/25/2019    Procedure: COLONOSCOPY;  Surgeon: Denis Eller MD;  [facility]: [facility];  Service: Endoscopy;  Laterality: N/A;    KNEE SURGERY      right    SHOULDER SURGERY      bilateral    SINUS SURGERY      TONSILLECTOMY      VASECTOMY        Other (medical) :    Head Injury: n  Seizure: cocaine use / in 20s; late 30's (couple mre)  Diabetes :n  HTN Yes   Other:     Substance Use:    Once start [employer] avoid cannabis    When revert would isolate ; did for release ; drug choice was cocaine    Alcohol: last 9-8 2020;   Start: 10y  regular use: 12y UNTIL decaon (1st period sober) when left start back drinking  At 12y: every weekend: beer 6 12 oa  16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 / 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; 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 (2-6 beer)    Blackouts: deneis   No seizure:    Relationships: when drink / isolate / fear of work consequence IF they learned     Was drinking 5th a day (3 yr ago; "drank hard = 5th a day"); until passed out; had get up 4 a or 4;30a ; in fear breathalizer; combat by get grits rodríguez     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    Cannabis: 5th grade 1st  Try; 8th grade : at least 1x week; daily use: 16y until 18y then when came across    Tobacco:former ; stopped 2008 / start 21 yrs olf  Cocaine:years ago   Benzo: n  Opioid: n  Ecstasy:n  Other:n    Family History:  Family History   Problem Relation Age of Onset    Hypertension Mother     Hypertension Father     Diabetes Father     Heart disease Paternal Grandmother       Grandparent: Drug problems brother drug problem   Parents uncles / depression   Daughter depression    Mother and aunt history significant anxiety     3 wishes? :  Serenity, comfortable in skin , (20y) daughter not have seizures or migranes     PSYCHO-SOCIAL DEVELOPMENT HISTORY:     [facility] Born: [facility]    Siblings (full or half)  Brothers: 1 older 2 1/2   Sisters: none    Parents : alive     Briefly Describe  your Mom: poor health controlling   Briefly Describe your Dad: poor health unhappy anxious    Bio Mom: Occupation:    Bio Dad:  Occupation: car sales     Marital Status:  / x2 to same lady (15 y / 1995); 2 1/2 y [place] ([place], [place]) ; speak frequently ; she RN school     Children   Girls  (ages): 1 daughter 20 y: seizures migraines vertigo / depression anxiety / [employer] / 2nd yr journalism  Boys (ages):     Physical Abuse: N    Sexual abuse  N    Other trauma / abuse? 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 n

## 2020-12-02 ENCOUNTER — PATIENT MESSAGE (OUTPATIENT)
Dept: PSYCHIATRY | Facility: CLINIC | Age: 50
End: 2020-12-02

## 2020-12-05 DIAGNOSIS — F90.2 ADHD (ATTENTION DEFICIT HYPERACTIVITY DISORDER), COMBINED TYPE: ICD-10-CM

## 2020-12-05 RX ORDER — DEXTROAMPHETAMINE SACCHARATE, AMPHETAMINE ASPARTATE, DEXTROAMPHETAMINE SULFATE AND AMPHETAMINE SULFATE 7.5; 7.5; 7.5; 7.5 MG/1; MG/1; MG/1; MG/1
30 TABLET ORAL 2 TIMES DAILY
Qty: 60 TABLET | Refills: 0 | Status: SHIPPED | OUTPATIENT
Start: 2021-01-08 | End: 2020-12-06 | Stop reason: SDUPTHER

## 2020-12-05 RX ORDER — DEXTROAMPHETAMINE SACCHARATE, AMPHETAMINE ASPARTATE, DEXTROAMPHETAMINE SULFATE AND AMPHETAMINE SULFATE 7.5; 7.5; 7.5; 7.5 MG/1; MG/1; MG/1; MG/1
30 TABLET ORAL 2 TIMES DAILY
Qty: 60 TABLET | Refills: 0 | Status: SHIPPED | OUTPATIENT
Start: 2020-12-11 | End: 2020-12-06

## 2020-12-06 DIAGNOSIS — F90.2 ADHD (ATTENTION DEFICIT HYPERACTIVITY DISORDER), COMBINED TYPE: ICD-10-CM

## 2020-12-06 RX ORDER — DEXTROAMPHETAMINE SACCHARATE, AMPHETAMINE ASPARTATE, DEXTROAMPHETAMINE SULFATE AND AMPHETAMINE SULFATE 7.5; 7.5; 7.5; 7.5 MG/1; MG/1; MG/1; MG/1
30 TABLET ORAL 2 TIMES DAILY
Qty: 60 TABLET | Refills: 0 | Status: SHIPPED | OUTPATIENT
Start: 2021-01-04 | End: 2021-01-05

## 2020-12-06 NOTE — PROGRESS NOTES
In context Dose Adjust from adderall XR 30 mg to what he reprots he used to take with Dr Carlos Segovia (Adderall Immed Release 30 mg TWICE DAILY / or 60 mg a day: see recent progress note and Pt messages for more detail).    Based on information to date: Dec 6 2020:    At his request, I originally sent Erx for [facility]  YET I was told requires Prior Authorization.and as such routing to [facility]    See pt sent message today saying that as he going out town he DID fill [facility]  Adderall Imm Release 30 mg BID #60 NR    (says also he told sponsor)    [facility] monitor does not yet reflect / today Sunday    Nonetheless I did cancel the Rx for [facility] December     I left the Jan 2021 Rx in place at [facility] as will need be submitted for Prior auth.    Notation on that Erx / I noted that in my research pt prior dosing history    He reported Juliette Barber MD HAD IN PAST Rx aderall Imm Releas for him 30 BID (says actually even higher in past).    Yet I did review Epic  and I DID See 9- [facility] note noting he was taking THEN 15 mg FOUR times a day (thus 60 mg)    I did move the Jan 2021 Erx from 1-8 to 1-4 Dose adjusted and he reports picked up Dec 5    I sent pt message of such and reminded of next appt 1- at 10 am  D Post MD    Time 20 min / re messaging pt ; deling with particulars of dating of Rx / checking [place] [facility] monitor ; cancelling Dec Rx and adjusting date by 4 days for Jan Rx for adderall IM

## 2020-12-06 NOTE — PROGRESS NOTES
Pt attested that  Dr Segovia used to take adderall Immed release / re: Carlos Segovia MD (60 mg a day);     Pt reports took 60 mg a day x years     In fact pt says at one point was taking more that that    In my review of records, I see attempts to get records from Juliette SCHNEIDER o to no avail    See  [facility] Progress note doesSCANNED IN 9-: (from note of 8-)   SHOWS ADDERALL 15 mg 4x a day (60 mg a day)    Rx : [facility] need [facility] Auth;   Will submit thru [facility].    Mssg sent to pt.  D Post MD    Time 20 min / reseach chart; rewrite Rx and documentation to pharmacy and to EPIC

## 2020-12-07 ENCOUNTER — TELEPHONE (OUTPATIENT)
Dept: PSYCHIATRY | Facility: CLINIC | Age: 50
End: 2020-12-07

## 2020-12-07 NOTE — TELEPHONE ENCOUNTER
Called [facility]    Spoke to Temi Crespo    Says he thinks Mr Casanova did fill on 12-5-2020 as pt attests    I noted that I cancelled out any other stimulant with [facility] and mvoed to [facility] for Jan 2021    (may return to [facility] after gets Prior auth set up)    D Post MD

## 2020-12-26 ENCOUNTER — PATIENT MESSAGE (OUTPATIENT)
Dept: FAMILY MEDICINE | Facility: CLINIC | Age: 50
End: 2020-12-26

## 2021-01-05 ENCOUNTER — PATIENT MESSAGE (OUTPATIENT)
Dept: PSYCHIATRY | Facility: CLINIC | Age: 51
End: 2021-01-05

## 2021-01-05 ENCOUNTER — TELEPHONE (OUTPATIENT)
Dept: PSYCHIATRY | Facility: CLINIC | Age: 51
End: 2021-01-05

## 2021-01-05 DIAGNOSIS — F90.2 ADHD (ATTENTION DEFICIT HYPERACTIVITY DISORDER), COMBINED TYPE: ICD-10-CM

## 2021-01-05 RX ORDER — DEXTROAMPHETAMINE SACCHARATE, AMPHETAMINE ASPARTATE, DEXTROAMPHETAMINE SULFATE AND AMPHETAMINE SULFATE 7.5; 7.5; 7.5; 7.5 MG/1; MG/1; MG/1; MG/1
30 TABLET ORAL 2 TIMES DAILY
Qty: 60 TABLET | Refills: 0 | Status: SHIPPED | OUTPATIENT
Start: 2021-01-05 | End: 2021-01-19 | Stop reason: SDUPTHER

## 2021-01-06 ENCOUNTER — TELEPHONE (OUTPATIENT)
Dept: PSYCHIATRY | Facility: CLINIC | Age: 51
End: 2021-01-06

## 2021-01-06 DIAGNOSIS — F90.2 ADHD (ATTENTION DEFICIT HYPERACTIVITY DISORDER), COMBINED TYPE: ICD-10-CM

## 2021-01-07 ENCOUNTER — TELEPHONE (OUTPATIENT)
Dept: PHARMACY | Facility: CLINIC | Age: 51
End: 2021-01-07

## 2021-01-10 RX ORDER — DEXTROAMPHETAMINE SACCHARATE, AMPHETAMINE ASPARTATE, DEXTROAMPHETAMINE SULFATE AND AMPHETAMINE SULFATE 7.5; 7.5; 7.5; 7.5 MG/1; MG/1; MG/1; MG/1
30 TABLET ORAL 2 TIMES DAILY
Qty: 60 TABLET | Refills: 0 | OUTPATIENT
Start: 2021-01-10 | End: 2021-02-09

## 2021-01-12 DIAGNOSIS — F90.2 ADHD (ATTENTION DEFICIT HYPERACTIVITY DISORDER), COMBINED TYPE: ICD-10-CM

## 2021-01-13 RX ORDER — DEXTROAMPHETAMINE SACCHARATE, AMPHETAMINE ASPARTATE, DEXTROAMPHETAMINE SULFATE AND AMPHETAMINE SULFATE 7.5; 7.5; 7.5; 7.5 MG/1; MG/1; MG/1; MG/1
30 TABLET ORAL 2 TIMES DAILY
Qty: 60 TABLET | Refills: 0 | OUTPATIENT
Start: 2021-01-13 | End: 2021-02-12

## 2021-01-19 ENCOUNTER — OFFICE VISIT (OUTPATIENT)
Dept: PSYCHIATRY | Facility: CLINIC | Age: 51
End: 2021-01-19
Payer: COMMERCIAL

## 2021-01-19 DIAGNOSIS — F40.10 SOCIAL ANXIETY DISORDER: ICD-10-CM

## 2021-01-19 DIAGNOSIS — F10.11 ALCOHOL ABUSE, IN REMISSION: ICD-10-CM

## 2021-01-19 DIAGNOSIS — F90.2 ADHD (ATTENTION DEFICIT HYPERACTIVITY DISORDER), COMBINED TYPE: ICD-10-CM

## 2021-01-19 DIAGNOSIS — F17.220 NICOTINE DEPENDENCE, CHEWING TOBACCO, UNCOMPLICATED: ICD-10-CM

## 2021-01-19 DIAGNOSIS — F14.10 COCAINE ABUSE: ICD-10-CM

## 2021-01-19 DIAGNOSIS — F31.9 BIPOLAR 1 DISORDER, DEPRESSED: Primary | ICD-10-CM

## 2021-01-19 DIAGNOSIS — G47.00 INSOMNIA, UNSPECIFIED TYPE: ICD-10-CM

## 2021-01-19 DIAGNOSIS — F43.21 GRIEF: ICD-10-CM

## 2021-01-19 PROCEDURE — 99214 OFFICE O/P EST MOD 30 MIN: CPT | Mod: 95,,, | Performed by: PSYCHIATRY & NEUROLOGY

## 2021-01-19 PROCEDURE — 99214 PR OFFICE/OUTPT VISIT, EST, LEVL IV, 30-39 MIN: ICD-10-PCS | Mod: 95,,, | Performed by: PSYCHIATRY & NEUROLOGY

## 2021-01-19 RX ORDER — LAMOTRIGINE 200 MG/1
200 TABLET ORAL DAILY
Qty: 30 TABLET | Refills: 2 | Status: SHIPPED | OUTPATIENT
Start: 2021-01-19 | End: 2021-03-15 | Stop reason: SDUPTHER

## 2021-01-19 RX ORDER — DEXTROAMPHETAMINE SACCHARATE, AMPHETAMINE ASPARTATE, DEXTROAMPHETAMINE SULFATE AND AMPHETAMINE SULFATE 7.5; 7.5; 7.5; 7.5 MG/1; MG/1; MG/1; MG/1
30 TABLET ORAL 2 TIMES DAILY
Qty: 60 TABLET | Refills: 0 | Status: SHIPPED | OUTPATIENT
Start: 2021-03-08 | End: 2021-03-15 | Stop reason: SDUPTHER

## 2021-01-19 RX ORDER — ARIPIPRAZOLE 5 MG/1
5 TABLET ORAL DAILY
Qty: 30 TABLET | Refills: 2 | Status: SHIPPED | OUTPATIENT
Start: 2021-01-19 | End: 2021-03-15

## 2021-01-19 RX ORDER — ZOLPIDEM TARTRATE 10 MG/1
10 TABLET ORAL NIGHTLY PRN
Qty: 30 TABLET | Refills: 2 | Status: SHIPPED | OUTPATIENT
Start: 2021-02-04 | End: 2021-03-15 | Stop reason: SDUPTHER

## 2021-01-19 RX ORDER — DEXTROAMPHETAMINE SACCHARATE, AMPHETAMINE ASPARTATE, DEXTROAMPHETAMINE SULFATE AND AMPHETAMINE SULFATE 7.5; 7.5; 7.5; 7.5 MG/1; MG/1; MG/1; MG/1
30 TABLET ORAL 2 TIMES DAILY
Qty: 60 TABLET | Refills: 0 | Status: SHIPPED | OUTPATIENT
Start: 2021-02-06 | End: 2021-03-08

## 2021-03-15 ENCOUNTER — OFFICE VISIT (OUTPATIENT)
Dept: PSYCHIATRY | Facility: CLINIC | Age: 51
End: 2021-03-15
Payer: COMMERCIAL

## 2021-03-15 ENCOUNTER — PATIENT OUTREACH (OUTPATIENT)
Dept: ADMINISTRATIVE | Facility: HOSPITAL | Age: 51
End: 2021-03-15

## 2021-03-15 VITALS
WEIGHT: 210.75 LBS | SYSTOLIC BLOOD PRESSURE: 124 MMHG | HEART RATE: 96 BPM | DIASTOLIC BLOOD PRESSURE: 92 MMHG | BODY MASS INDEX: 30.24 KG/M2

## 2021-03-15 DIAGNOSIS — F90.2 ADHD (ATTENTION DEFICIT HYPERACTIVITY DISORDER), COMBINED TYPE: ICD-10-CM

## 2021-03-15 DIAGNOSIS — F43.21 GRIEF: ICD-10-CM

## 2021-03-15 DIAGNOSIS — F40.10 SOCIAL ANXIETY DISORDER: ICD-10-CM

## 2021-03-15 DIAGNOSIS — F31.9 BIPOLAR 1 DISORDER, DEPRESSED: Primary | ICD-10-CM

## 2021-03-15 DIAGNOSIS — G47.00 INSOMNIA, UNSPECIFIED TYPE: ICD-10-CM

## 2021-03-15 DIAGNOSIS — F17.220 NICOTINE DEPENDENCE, CHEWING TOBACCO, UNCOMPLICATED: ICD-10-CM

## 2021-03-15 DIAGNOSIS — F14.10 COCAINE ABUSE: ICD-10-CM

## 2021-03-15 DIAGNOSIS — F10.11 ALCOHOL ABUSE, IN REMISSION: ICD-10-CM

## 2021-03-15 PROCEDURE — 99214 OFFICE O/P EST MOD 30 MIN: CPT | Mod: S$GLB,,, | Performed by: PSYCHIATRY & NEUROLOGY

## 2021-03-15 PROCEDURE — 99214 PR OFFICE/OUTPT VISIT, EST, LEVL IV, 30-39 MIN: ICD-10-PCS | Mod: S$GLB,,, | Performed by: PSYCHIATRY & NEUROLOGY

## 2021-03-15 PROCEDURE — 99999 PR PBB SHADOW E&M-EST. PATIENT-LVL II: CPT | Mod: PBBFAC,,, | Performed by: PSYCHIATRY & NEUROLOGY

## 2021-03-15 PROCEDURE — 99999 PR PBB SHADOW E&M-EST. PATIENT-LVL II: ICD-10-PCS | Mod: PBBFAC,,, | Performed by: PSYCHIATRY & NEUROLOGY

## 2021-03-15 RX ORDER — ARIPIPRAZOLE 10 MG/1
10 TABLET ORAL DAILY
Qty: 30 TABLET | Refills: 2 | Status: SHIPPED | OUTPATIENT
Start: 2021-03-15 | End: 2021-05-03

## 2021-03-15 RX ORDER — DEXTROAMPHETAMINE SACCHARATE, AMPHETAMINE ASPARTATE, DEXTROAMPHETAMINE SULFATE AND AMPHETAMINE SULFATE 7.5; 7.5; 7.5; 7.5 MG/1; MG/1; MG/1; MG/1
30 TABLET ORAL 2 TIMES DAILY
Qty: 60 TABLET | Refills: 0 | Status: SHIPPED | OUTPATIENT
Start: 2021-04-07 | End: 2021-05-07

## 2021-03-15 RX ORDER — DEXTROAMPHETAMINE SACCHARATE, AMPHETAMINE ASPARTATE, DEXTROAMPHETAMINE SULFATE AND AMPHETAMINE SULFATE 7.5; 7.5; 7.5; 7.5 MG/1; MG/1; MG/1; MG/1
30 TABLET ORAL 2 TIMES DAILY
Qty: 60 TABLET | Refills: 0 | Status: SHIPPED | OUTPATIENT
Start: 2021-05-07 | End: 2021-05-03 | Stop reason: SDUPTHER

## 2021-03-15 RX ORDER — LAMOTRIGINE 200 MG/1
200 TABLET ORAL DAILY
Qty: 30 TABLET | Refills: 2 | Status: SHIPPED | OUTPATIENT
Start: 2021-03-15 | End: 2021-05-03 | Stop reason: SDUPTHER

## 2021-03-15 RX ORDER — ZOLPIDEM TARTRATE 10 MG/1
10 TABLET ORAL NIGHTLY PRN
Qty: 30 TABLET | Refills: 2 | Status: SHIPPED | OUTPATIENT
Start: 2021-04-05 | End: 2021-05-03 | Stop reason: SDUPTHER

## 2021-04-19 ENCOUNTER — OFFICE VISIT (OUTPATIENT)
Dept: FAMILY MEDICINE | Facility: CLINIC | Age: 51
End: 2021-04-19
Payer: COMMERCIAL

## 2021-04-19 ENCOUNTER — LAB VISIT (OUTPATIENT)
Dept: LAB | Facility: HOSPITAL | Age: 51
End: 2021-04-19
Attending: FAMILY MEDICINE
Payer: COMMERCIAL

## 2021-04-19 VITALS
HEIGHT: 70 IN | SYSTOLIC BLOOD PRESSURE: 112 MMHG | OXYGEN SATURATION: 98 % | HEART RATE: 89 BPM | DIASTOLIC BLOOD PRESSURE: 88 MMHG | BODY MASS INDEX: 30.77 KG/M2 | TEMPERATURE: 99 F | WEIGHT: 214.94 LBS

## 2021-04-19 DIAGNOSIS — G47.00 INSOMNIA, UNSPECIFIED TYPE: ICD-10-CM

## 2021-04-19 DIAGNOSIS — F40.10 SOCIAL ANXIETY DISORDER: ICD-10-CM

## 2021-04-19 DIAGNOSIS — F98.8 ATTENTION DEFICIT DISORDER, UNSPECIFIED HYPERACTIVITY PRESENCE: ICD-10-CM

## 2021-04-19 DIAGNOSIS — I10 ESSENTIAL HYPERTENSION: ICD-10-CM

## 2021-04-19 DIAGNOSIS — F10.11 ALCOHOL ABUSE, IN REMISSION: ICD-10-CM

## 2021-04-19 DIAGNOSIS — Z00.00 WELL ADULT EXAM: Primary | ICD-10-CM

## 2021-04-19 DIAGNOSIS — Z00.00 WELL ADULT EXAM: ICD-10-CM

## 2021-04-19 LAB
ALBUMIN SERPL BCP-MCNC: 4.2 G/DL (ref 3.5–5.2)
ALP SERPL-CCNC: 63 U/L (ref 55–135)
ALT SERPL W/O P-5'-P-CCNC: 25 U/L (ref 10–44)
ANION GAP SERPL CALC-SCNC: 6 MMOL/L (ref 8–16)
AST SERPL-CCNC: 21 U/L (ref 10–40)
BASOPHILS # BLD AUTO: 0.07 K/UL (ref 0–0.2)
BASOPHILS NFR BLD: 1.3 % (ref 0–1.9)
BILIRUB SERPL-MCNC: 0.4 MG/DL (ref 0.1–1)
BUN SERPL-MCNC: 10 MG/DL (ref 6–20)
CALCIUM SERPL-MCNC: 8.7 MG/DL (ref 8.7–10.5)
CHLORIDE SERPL-SCNC: 105 MMOL/L (ref 95–110)
CHOLEST SERPL-MCNC: 193 MG/DL (ref 120–199)
CHOLEST/HDLC SERPL: 6.4 {RATIO} (ref 2–5)
CO2 SERPL-SCNC: 28 MMOL/L (ref 23–29)
CREAT SERPL-MCNC: 1.2 MG/DL (ref 0.5–1.4)
DIFFERENTIAL METHOD: ABNORMAL
EOSINOPHIL # BLD AUTO: 0.1 K/UL (ref 0–0.5)
EOSINOPHIL NFR BLD: 1.3 % (ref 0–8)
ERYTHROCYTE [DISTWIDTH] IN BLOOD BY AUTOMATED COUNT: 12.7 % (ref 11.5–14.5)
EST. GFR  (AFRICAN AMERICAN): >60 ML/MIN/1.73 M^2
EST. GFR  (NON AFRICAN AMERICAN): >60 ML/MIN/1.73 M^2
GLUCOSE SERPL-MCNC: 95 MG/DL (ref 70–110)
HCT VFR BLD AUTO: 56.1 % (ref 40–54)
HDLC SERPL-MCNC: 30 MG/DL (ref 40–75)
HDLC SERPL: 15.5 % (ref 20–50)
HGB BLD-MCNC: 18.5 G/DL (ref 14–18)
IMM GRANULOCYTES # BLD AUTO: 0.03 K/UL (ref 0–0.04)
IMM GRANULOCYTES NFR BLD AUTO: 0.5 % (ref 0–0.5)
LDLC SERPL CALC-MCNC: 138.8 MG/DL (ref 63–159)
LYMPHOCYTES # BLD AUTO: 1.5 K/UL (ref 1–4.8)
LYMPHOCYTES NFR BLD: 26.7 % (ref 18–48)
MCH RBC QN AUTO: 32 PG (ref 27–31)
MCHC RBC AUTO-ENTMCNC: 33 G/DL (ref 32–36)
MCV RBC AUTO: 97 FL (ref 82–98)
MONOCYTES # BLD AUTO: 0.6 K/UL (ref 0.3–1)
MONOCYTES NFR BLD: 11.3 % (ref 4–15)
NEUTROPHILS # BLD AUTO: 3.3 K/UL (ref 1.8–7.7)
NEUTROPHILS NFR BLD: 58.9 % (ref 38–73)
NONHDLC SERPL-MCNC: 163 MG/DL
NRBC BLD-RTO: 0 /100 WBC
PLATELET # BLD AUTO: 238 K/UL (ref 150–450)
PMV BLD AUTO: 11.8 FL (ref 9.2–12.9)
POTASSIUM SERPL-SCNC: 5.2 MMOL/L (ref 3.5–5.1)
PROT SERPL-MCNC: 7.5 G/DL (ref 6–8.4)
RBC # BLD AUTO: 5.78 M/UL (ref 4.6–6.2)
SODIUM SERPL-SCNC: 139 MMOL/L (ref 136–145)
TRIGL SERPL-MCNC: 121 MG/DL (ref 30–150)
WBC # BLD AUTO: 5.58 K/UL (ref 3.9–12.7)

## 2021-04-19 PROCEDURE — 99999 PR PBB SHADOW E&M-EST. PATIENT-LVL III: ICD-10-PCS | Mod: PBBFAC,,, | Performed by: FAMILY MEDICINE

## 2021-04-19 PROCEDURE — 99999 PR PBB SHADOW E&M-EST. PATIENT-LVL III: CPT | Mod: PBBFAC,,, | Performed by: FAMILY MEDICINE

## 2021-04-19 PROCEDURE — 90472 IMMUNIZATION ADMIN EACH ADD: CPT | Mod: S$GLB,,, | Performed by: FAMILY MEDICINE

## 2021-04-19 PROCEDURE — 85025 COMPLETE CBC W/AUTO DIFF WBC: CPT | Performed by: FAMILY MEDICINE

## 2021-04-19 PROCEDURE — 36415 COLL VENOUS BLD VENIPUNCTURE: CPT | Mod: PO | Performed by: FAMILY MEDICINE

## 2021-04-19 PROCEDURE — 99396 PR PREVENTIVE VISIT,EST,40-64: ICD-10-PCS | Mod: 25,S$GLB,, | Performed by: FAMILY MEDICINE

## 2021-04-19 PROCEDURE — 90471 IMMUNIZATION ADMIN: CPT | Mod: S$GLB,,, | Performed by: FAMILY MEDICINE

## 2021-04-19 PROCEDURE — 90750 HZV VACC RECOMBINANT IM: CPT | Mod: S$GLB,,, | Performed by: FAMILY MEDICINE

## 2021-04-19 PROCEDURE — 90471 TDAP VACCINE GREATER THAN OR EQUAL TO 7YO IM: ICD-10-PCS | Mod: S$GLB,,, | Performed by: FAMILY MEDICINE

## 2021-04-19 PROCEDURE — 83036 HEMOGLOBIN GLYCOSYLATED A1C: CPT | Performed by: FAMILY MEDICINE

## 2021-04-19 PROCEDURE — 90715 TDAP VACCINE 7 YRS/> IM: CPT | Mod: S$GLB,,, | Performed by: FAMILY MEDICINE

## 2021-04-19 PROCEDURE — 80061 LIPID PANEL: CPT | Performed by: FAMILY MEDICINE

## 2021-04-19 PROCEDURE — 80053 COMPREHEN METABOLIC PANEL: CPT | Performed by: FAMILY MEDICINE

## 2021-04-19 PROCEDURE — 99396 PREV VISIT EST AGE 40-64: CPT | Mod: 25,S$GLB,, | Performed by: FAMILY MEDICINE

## 2021-04-19 PROCEDURE — 90715 TDAP VACCINE GREATER THAN OR EQUAL TO 7YO IM: ICD-10-PCS | Mod: S$GLB,,, | Performed by: FAMILY MEDICINE

## 2021-04-19 PROCEDURE — 90472 ZOSTER RECOMBINANT VACCINE: ICD-10-PCS | Mod: S$GLB,,, | Performed by: FAMILY MEDICINE

## 2021-04-19 PROCEDURE — 90750 ZOSTER RECOMBINANT VACCINE: ICD-10-PCS | Mod: S$GLB,,, | Performed by: FAMILY MEDICINE

## 2021-04-20 LAB
ESTIMATED AVG GLUCOSE: 103 MG/DL (ref 68–131)
HBA1C MFR BLD: 5.2 % (ref 4–5.6)

## 2021-04-22 ENCOUNTER — TELEPHONE (OUTPATIENT)
Dept: FAMILY MEDICINE | Facility: CLINIC | Age: 51
End: 2021-04-22

## 2021-04-22 DIAGNOSIS — D58.2 ELEVATED HEMOGLOBIN: Primary | ICD-10-CM

## 2021-04-23 ENCOUNTER — PATIENT MESSAGE (OUTPATIENT)
Dept: FAMILY MEDICINE | Facility: CLINIC | Age: 51
End: 2021-04-23

## 2021-04-23 ENCOUNTER — TELEPHONE (OUTPATIENT)
Dept: FAMILY MEDICINE | Facility: CLINIC | Age: 51
End: 2021-04-23

## 2021-04-29 ENCOUNTER — OFFICE VISIT (OUTPATIENT)
Dept: HEMATOLOGY/ONCOLOGY | Facility: CLINIC | Age: 51
End: 2021-04-29
Payer: COMMERCIAL

## 2021-04-29 ENCOUNTER — LAB VISIT (OUTPATIENT)
Dept: LAB | Facility: HOSPITAL | Age: 51
End: 2021-04-29
Attending: INTERNAL MEDICINE
Payer: COMMERCIAL

## 2021-04-29 VITALS
TEMPERATURE: 97 F | HEART RATE: 100 BPM | WEIGHT: 214.31 LBS | DIASTOLIC BLOOD PRESSURE: 88 MMHG | OXYGEN SATURATION: 96 % | BODY MASS INDEX: 30.68 KG/M2 | HEIGHT: 70 IN | SYSTOLIC BLOOD PRESSURE: 140 MMHG

## 2021-04-29 DIAGNOSIS — Z12.11 COLON CANCER SCREENING: ICD-10-CM

## 2021-04-29 DIAGNOSIS — D75.1 ERYTHROCYTOSIS: ICD-10-CM

## 2021-04-29 DIAGNOSIS — D58.2 ELEVATED HEMOGLOBIN: ICD-10-CM

## 2021-04-29 LAB
ALBUMIN SERPL BCP-MCNC: 4.1 G/DL (ref 3.5–5.2)
ALP SERPL-CCNC: 66 U/L (ref 55–135)
ALT SERPL W/O P-5'-P-CCNC: 19 U/L (ref 10–44)
ANION GAP SERPL CALC-SCNC: 9 MMOL/L (ref 8–16)
AST SERPL-CCNC: 17 U/L (ref 10–40)
BASOPHILS # BLD AUTO: 0.04 K/UL (ref 0–0.2)
BASOPHILS NFR BLD: 0.5 % (ref 0–1.9)
BILIRUB SERPL-MCNC: 0.3 MG/DL (ref 0.1–1)
BUN SERPL-MCNC: 16 MG/DL (ref 6–20)
CALCIUM SERPL-MCNC: 9 MG/DL (ref 8.7–10.5)
CHLORIDE SERPL-SCNC: 108 MMOL/L (ref 95–110)
CO2 SERPL-SCNC: 24 MMOL/L (ref 23–29)
CREAT SERPL-MCNC: 1.3 MG/DL (ref 0.5–1.4)
DIFFERENTIAL METHOD: ABNORMAL
EOSINOPHIL # BLD AUTO: 0.2 K/UL (ref 0–0.5)
EOSINOPHIL NFR BLD: 2.1 % (ref 0–8)
ERYTHROCYTE [DISTWIDTH] IN BLOOD BY AUTOMATED COUNT: 12.3 % (ref 11.5–14.5)
EST. GFR  (AFRICAN AMERICAN): >60 ML/MIN/1.73 M^2
EST. GFR  (NON AFRICAN AMERICAN): >60 ML/MIN/1.73 M^2
GLUCOSE SERPL-MCNC: 104 MG/DL (ref 70–110)
HCT VFR BLD AUTO: 52.7 % (ref 40–54)
HGB BLD-MCNC: 17.5 G/DL (ref 14–18)
IMM GRANULOCYTES # BLD AUTO: 0.03 K/UL (ref 0–0.04)
IMM GRANULOCYTES NFR BLD AUTO: 0.4 % (ref 0–0.5)
LYMPHOCYTES # BLD AUTO: 1.7 K/UL (ref 1–4.8)
LYMPHOCYTES NFR BLD: 23.1 % (ref 18–48)
MCH RBC QN AUTO: 31.1 PG (ref 27–31)
MCHC RBC AUTO-ENTMCNC: 33.2 G/DL (ref 32–36)
MCV RBC AUTO: 94 FL (ref 82–98)
MONOCYTES # BLD AUTO: 0.7 K/UL (ref 0.3–1)
MONOCYTES NFR BLD: 9.3 % (ref 4–15)
NEUTROPHILS # BLD AUTO: 4.7 K/UL (ref 1.8–7.7)
NEUTROPHILS NFR BLD: 64.6 % (ref 38–73)
NRBC BLD-RTO: 0 /100 WBC
PLATELET # BLD AUTO: 231 K/UL (ref 150–450)
PMV BLD AUTO: 11.1 FL (ref 9.2–12.9)
POTASSIUM SERPL-SCNC: 4.2 MMOL/L (ref 3.5–5.1)
PROT SERPL-MCNC: 7 G/DL (ref 6–8.4)
RBC # BLD AUTO: 5.62 M/UL (ref 4.6–6.2)
SODIUM SERPL-SCNC: 141 MMOL/L (ref 136–145)
WBC # BLD AUTO: 7.31 K/UL (ref 3.9–12.7)

## 2021-04-29 PROCEDURE — 80053 COMPREHEN METABOLIC PANEL: CPT | Performed by: INTERNAL MEDICINE

## 2021-04-29 PROCEDURE — 81339 MPL GENE SEQ ALYS EXON 10: CPT | Performed by: INTERNAL MEDICINE

## 2021-04-29 PROCEDURE — 82668 ASSAY OF ERYTHROPOIETIN: CPT | Performed by: INTERNAL MEDICINE

## 2021-04-29 PROCEDURE — 99999 PR PBB SHADOW E&M-EST. PATIENT-LVL IV: ICD-10-PCS | Mod: PBBFAC,,, | Performed by: INTERNAL MEDICINE

## 2021-04-29 PROCEDURE — 99999 PR PBB SHADOW E&M-EST. PATIENT-LVL IV: CPT | Mod: PBBFAC,,, | Performed by: INTERNAL MEDICINE

## 2021-04-29 PROCEDURE — 99245 OFF/OP CONSLTJ NEW/EST HI 55: CPT | Mod: S$GLB,,, | Performed by: INTERNAL MEDICINE

## 2021-04-29 PROCEDURE — 81403 MOPATH PROCEDURE LEVEL 4: CPT | Performed by: INTERNAL MEDICINE

## 2021-04-29 PROCEDURE — 99245 PR OFFICE CONSULTATION,LEVEL V: ICD-10-PCS | Mod: S$GLB,,, | Performed by: INTERNAL MEDICINE

## 2021-04-29 PROCEDURE — 81270 JAK2 GENE: CPT | Performed by: INTERNAL MEDICINE

## 2021-04-29 PROCEDURE — 85025 COMPLETE CBC W/AUTO DIFF WBC: CPT | Performed by: INTERNAL MEDICINE

## 2021-04-29 PROCEDURE — 81219 CALR GENE COM VARIANTS: CPT | Performed by: INTERNAL MEDICINE

## 2021-05-03 ENCOUNTER — OFFICE VISIT (OUTPATIENT)
Dept: PSYCHIATRY | Facility: CLINIC | Age: 51
End: 2021-05-03
Payer: COMMERCIAL

## 2021-05-03 DIAGNOSIS — F43.21 GRIEF: ICD-10-CM

## 2021-05-03 DIAGNOSIS — G47.00 INSOMNIA, UNSPECIFIED TYPE: ICD-10-CM

## 2021-05-03 DIAGNOSIS — F10.11 ALCOHOL ABUSE, IN REMISSION: ICD-10-CM

## 2021-05-03 DIAGNOSIS — F17.220 NICOTINE DEPENDENCE, CHEWING TOBACCO, UNCOMPLICATED: ICD-10-CM

## 2021-05-03 DIAGNOSIS — F40.10 SOCIAL ANXIETY DISORDER: ICD-10-CM

## 2021-05-03 DIAGNOSIS — F90.2 ADHD (ATTENTION DEFICIT HYPERACTIVITY DISORDER), COMBINED TYPE: ICD-10-CM

## 2021-05-03 DIAGNOSIS — F14.10 COCAINE ABUSE: ICD-10-CM

## 2021-05-03 DIAGNOSIS — F31.9 BIPOLAR 1 DISORDER, DEPRESSED: Primary | ICD-10-CM

## 2021-05-03 LAB — EPO SERPL-ACNC: 5.1 MIU/ML (ref 2.6–18.5)

## 2021-05-03 PROCEDURE — 99214 PR OFFICE/OUTPT VISIT, EST, LEVL IV, 30-39 MIN: ICD-10-PCS | Mod: 95,,, | Performed by: PSYCHIATRY & NEUROLOGY

## 2021-05-03 PROCEDURE — 99214 OFFICE O/P EST MOD 30 MIN: CPT | Mod: 95,,, | Performed by: PSYCHIATRY & NEUROLOGY

## 2021-05-03 RX ORDER — LAMOTRIGINE 200 MG/1
200 TABLET ORAL DAILY
Qty: 30 TABLET | Refills: 2 | Status: SHIPPED | OUTPATIENT
Start: 2021-05-03 | End: 2021-06-28 | Stop reason: SDUPTHER

## 2021-05-03 RX ORDER — ASENAPINE 5 MG/1
5 TABLET SUBLINGUAL NIGHTLY
Qty: 30 TABLET | Refills: 2 | Status: SHIPPED | OUTPATIENT
Start: 2021-05-03 | End: 2021-05-05

## 2021-05-03 RX ORDER — ZOLPIDEM TARTRATE 10 MG/1
10 TABLET ORAL NIGHTLY PRN
Qty: 30 TABLET | Refills: 2 | Status: SHIPPED | OUTPATIENT
Start: 2021-05-07 | End: 2021-06-28 | Stop reason: SDUPTHER

## 2021-05-03 RX ORDER — DEXTROAMPHETAMINE SACCHARATE, AMPHETAMINE ASPARTATE, DEXTROAMPHETAMINE SULFATE AND AMPHETAMINE SULFATE 7.5; 7.5; 7.5; 7.5 MG/1; MG/1; MG/1; MG/1
30 TABLET ORAL 2 TIMES DAILY
Qty: 60 TABLET | Refills: 0 | Status: SHIPPED | OUTPATIENT
Start: 2021-07-05 | End: 2021-06-28

## 2021-05-03 RX ORDER — DEXTROAMPHETAMINE SACCHARATE, AMPHETAMINE ASPARTATE, DEXTROAMPHETAMINE SULFATE AND AMPHETAMINE SULFATE 7.5; 7.5; 7.5; 7.5 MG/1; MG/1; MG/1; MG/1
30 TABLET ORAL 2 TIMES DAILY
Qty: 60 TABLET | Refills: 0 | Status: SHIPPED | OUTPATIENT
Start: 2021-06-05 | End: 2021-06-28

## 2021-05-03 RX ORDER — DEXTROAMPHETAMINE SACCHARATE, AMPHETAMINE ASPARTATE, DEXTROAMPHETAMINE SULFATE AND AMPHETAMINE SULFATE 7.5; 7.5; 7.5; 7.5 MG/1; MG/1; MG/1; MG/1
30 TABLET ORAL 2 TIMES DAILY
Qty: 60 TABLET | Refills: 0 | Status: SHIPPED | OUTPATIENT
Start: 2021-05-07 | End: 2021-06-06

## 2021-05-04 ENCOUNTER — PATIENT MESSAGE (OUTPATIENT)
Dept: PSYCHIATRY | Facility: CLINIC | Age: 51
End: 2021-05-04

## 2021-05-04 ENCOUNTER — PATIENT MESSAGE (OUTPATIENT)
Dept: HEMATOLOGY/ONCOLOGY | Facility: CLINIC | Age: 51
End: 2021-05-04

## 2021-05-05 DIAGNOSIS — F31.9 BIPOLAR 1 DISORDER, DEPRESSED: ICD-10-CM

## 2021-05-05 LAB
JAK2 EXON 12 MUTATION DETECTION BLOOD: NORMAL
PATH REPORT.FINAL DX SPEC: NORMAL

## 2021-05-05 RX ORDER — ASENAPINE 5 MG/1
5 TABLET SUBLINGUAL NIGHTLY
Qty: 30 TABLET | Refills: 2 | Status: SHIPPED | OUTPATIENT
Start: 2021-05-05 | End: 2021-05-17 | Stop reason: SDUPTHER

## 2021-05-06 LAB
MPNR  FINAL DIAGNOSIS: NORMAL
MPNR  SPECIMEN TYPE: NORMAL
MPNR RESULT: NORMAL

## 2021-05-11 ENCOUNTER — TELEPHONE (OUTPATIENT)
Dept: PSYCHIATRY | Facility: CLINIC | Age: 51
End: 2021-05-11

## 2021-05-16 ENCOUNTER — PATIENT MESSAGE (OUTPATIENT)
Dept: HEMATOLOGY/ONCOLOGY | Facility: CLINIC | Age: 51
End: 2021-05-16

## 2021-05-17 ENCOUNTER — TELEPHONE (OUTPATIENT)
Dept: PSYCHIATRY | Facility: CLINIC | Age: 51
End: 2021-05-17

## 2021-05-17 DIAGNOSIS — D75.1 ERYTHROCYTOSIS: Primary | ICD-10-CM

## 2021-05-17 DIAGNOSIS — F31.9 BIPOLAR 1 DISORDER, DEPRESSED: ICD-10-CM

## 2021-05-17 RX ORDER — ASENAPINE 5 MG/1
5 TABLET SUBLINGUAL NIGHTLY
Qty: 30 TABLET | Refills: 2 | Status: SHIPPED | OUTPATIENT
Start: 2021-05-17 | End: 2021-05-25

## 2021-05-18 ENCOUNTER — PATIENT MESSAGE (OUTPATIENT)
Dept: HEMATOLOGY/ONCOLOGY | Facility: CLINIC | Age: 51
End: 2021-05-18

## 2021-05-18 ENCOUNTER — PATIENT MESSAGE (OUTPATIENT)
Dept: PSYCHIATRY | Facility: CLINIC | Age: 51
End: 2021-05-18

## 2021-05-24 ENCOUNTER — LAB VISIT (OUTPATIENT)
Dept: LAB | Facility: HOSPITAL | Age: 51
End: 2021-05-24
Attending: INTERNAL MEDICINE
Payer: COMMERCIAL

## 2021-05-24 ENCOUNTER — PATIENT MESSAGE (OUTPATIENT)
Dept: PSYCHIATRY | Facility: CLINIC | Age: 51
End: 2021-05-24

## 2021-05-24 DIAGNOSIS — D75.1 ERYTHROCYTOSIS: ICD-10-CM

## 2021-05-24 PROCEDURE — 81256 HFE GENE: CPT | Performed by: INTERNAL MEDICINE

## 2021-05-24 PROCEDURE — 36415 COLL VENOUS BLD VENIPUNCTURE: CPT | Performed by: INTERNAL MEDICINE

## 2021-05-25 DIAGNOSIS — F31.9 BIPOLAR 1 DISORDER, DEPRESSED: Primary | ICD-10-CM

## 2021-05-25 RX ORDER — OLANZAPINE 2.5 MG/1
2.5 TABLET ORAL NIGHTLY
Qty: 30 TABLET | Refills: 0 | Status: SHIPPED | OUTPATIENT
Start: 2021-05-25 | End: 2021-06-28

## 2021-05-28 LAB
GENETICIST REVIEW: NORMAL
HFE GENE MUT ANL BLD/T: NORMAL
HFE RELEASED BY: NORMAL
HFE RESULT SUMMARY: NEGATIVE
REF LAB TEST METHOD: NORMAL
SPECIMEN SOURCE: NORMAL
SPECIMEN,  HEMOCHROMATOSIS: NORMAL

## 2021-05-31 ENCOUNTER — OFFICE VISIT (OUTPATIENT)
Dept: PSYCHIATRY | Facility: CLINIC | Age: 51
End: 2021-05-31
Payer: COMMERCIAL

## 2021-05-31 DIAGNOSIS — F10.11 ALCOHOL ABUSE, IN REMISSION: ICD-10-CM

## 2021-05-31 DIAGNOSIS — G47.00 INSOMNIA, UNSPECIFIED TYPE: ICD-10-CM

## 2021-05-31 DIAGNOSIS — F90.2 ADHD (ATTENTION DEFICIT HYPERACTIVITY DISORDER), COMBINED TYPE: ICD-10-CM

## 2021-05-31 DIAGNOSIS — F43.21 GRIEF: ICD-10-CM

## 2021-05-31 DIAGNOSIS — F40.10 SOCIAL ANXIETY DISORDER: ICD-10-CM

## 2021-05-31 DIAGNOSIS — F17.220 NICOTINE DEPENDENCE, CHEWING TOBACCO, UNCOMPLICATED: ICD-10-CM

## 2021-05-31 DIAGNOSIS — F14.10 COCAINE ABUSE: ICD-10-CM

## 2021-05-31 DIAGNOSIS — F31.9 BIPOLAR 1 DISORDER, DEPRESSED: Primary | ICD-10-CM

## 2021-05-31 PROCEDURE — 99214 PR OFFICE/OUTPT VISIT, EST, LEVL IV, 30-39 MIN: ICD-10-PCS | Mod: 95,,, | Performed by: PSYCHIATRY & NEUROLOGY

## 2021-05-31 PROCEDURE — 99214 OFFICE O/P EST MOD 30 MIN: CPT | Mod: 95,,, | Performed by: PSYCHIATRY & NEUROLOGY

## 2021-06-14 ENCOUNTER — OFFICE VISIT (OUTPATIENT)
Dept: HEMATOLOGY/ONCOLOGY | Facility: CLINIC | Age: 51
End: 2021-06-14
Payer: COMMERCIAL

## 2021-06-14 ENCOUNTER — LAB VISIT (OUTPATIENT)
Dept: LAB | Facility: HOSPITAL | Age: 51
End: 2021-06-14
Attending: INTERNAL MEDICINE
Payer: COMMERCIAL

## 2021-06-14 VITALS
DIASTOLIC BLOOD PRESSURE: 84 MMHG | HEART RATE: 74 BPM | WEIGHT: 204.56 LBS | SYSTOLIC BLOOD PRESSURE: 117 MMHG | HEIGHT: 70 IN | TEMPERATURE: 97 F | OXYGEN SATURATION: 98 % | BODY MASS INDEX: 29.29 KG/M2

## 2021-06-14 DIAGNOSIS — D75.1 ERYTHROCYTOSIS: ICD-10-CM

## 2021-06-14 DIAGNOSIS — Z12.11 COLON CANCER SCREENING: ICD-10-CM

## 2021-06-14 DIAGNOSIS — D58.2 ELEVATED HEMOGLOBIN: ICD-10-CM

## 2021-06-14 LAB
ALBUMIN SERPL BCP-MCNC: 4.3 G/DL (ref 3.5–5.2)
ALP SERPL-CCNC: 72 U/L (ref 55–135)
ALT SERPL W/O P-5'-P-CCNC: 26 U/L (ref 10–44)
ANION GAP SERPL CALC-SCNC: 11 MMOL/L (ref 8–16)
AST SERPL-CCNC: 17 U/L (ref 10–40)
BASOPHILS # BLD AUTO: 0.03 K/UL (ref 0–0.2)
BASOPHILS NFR BLD: 0.6 % (ref 0–1.9)
BILIRUB SERPL-MCNC: 0.6 MG/DL (ref 0.1–1)
BUN SERPL-MCNC: 13 MG/DL (ref 6–20)
CALCIUM SERPL-MCNC: 9.1 MG/DL (ref 8.7–10.5)
CHLORIDE SERPL-SCNC: 103 MMOL/L (ref 95–110)
CO2 SERPL-SCNC: 26 MMOL/L (ref 23–29)
CREAT SERPL-MCNC: 1.2 MG/DL (ref 0.5–1.4)
DIFFERENTIAL METHOD: ABNORMAL
EOSINOPHIL # BLD AUTO: 0.1 K/UL (ref 0–0.5)
EOSINOPHIL NFR BLD: 1.7 % (ref 0–8)
ERYTHROCYTE [DISTWIDTH] IN BLOOD BY AUTOMATED COUNT: 12.5 % (ref 11.5–14.5)
EST. GFR  (AFRICAN AMERICAN): >60 ML/MIN/1.73 M^2
EST. GFR  (NON AFRICAN AMERICAN): >60 ML/MIN/1.73 M^2
GLUCOSE SERPL-MCNC: 96 MG/DL (ref 70–110)
HCT VFR BLD AUTO: 54.3 % (ref 40–54)
HGB BLD-MCNC: 17.9 G/DL (ref 14–18)
IMM GRANULOCYTES # BLD AUTO: 0.01 K/UL (ref 0–0.04)
IMM GRANULOCYTES NFR BLD AUTO: 0.2 % (ref 0–0.5)
LYMPHOCYTES # BLD AUTO: 1.7 K/UL (ref 1–4.8)
LYMPHOCYTES NFR BLD: 32.1 % (ref 18–48)
MCH RBC QN AUTO: 30.4 PG (ref 27–31)
MCHC RBC AUTO-ENTMCNC: 33 G/DL (ref 32–36)
MCV RBC AUTO: 92 FL (ref 82–98)
MONOCYTES # BLD AUTO: 0.6 K/UL (ref 0.3–1)
MONOCYTES NFR BLD: 11.7 % (ref 4–15)
NEUTROPHILS # BLD AUTO: 2.9 K/UL (ref 1.8–7.7)
NEUTROPHILS NFR BLD: 53.7 % (ref 38–73)
NRBC BLD-RTO: 0 /100 WBC
PLATELET # BLD AUTO: 193 K/UL (ref 150–450)
PMV BLD AUTO: 10.6 FL (ref 9.2–12.9)
POTASSIUM SERPL-SCNC: 4.6 MMOL/L (ref 3.5–5.1)
PROT SERPL-MCNC: 7.4 G/DL (ref 6–8.4)
RBC # BLD AUTO: 5.88 M/UL (ref 4.6–6.2)
SODIUM SERPL-SCNC: 140 MMOL/L (ref 136–145)
WBC # BLD AUTO: 5.3 K/UL (ref 3.9–12.7)

## 2021-06-14 PROCEDURE — 99999 PR PBB SHADOW E&M-EST. PATIENT-LVL III: CPT | Mod: PBBFAC,,, | Performed by: INTERNAL MEDICINE

## 2021-06-14 PROCEDURE — 80053 COMPREHEN METABOLIC PANEL: CPT | Performed by: INTERNAL MEDICINE

## 2021-06-14 PROCEDURE — 99214 PR OFFICE/OUTPT VISIT, EST, LEVL IV, 30-39 MIN: ICD-10-PCS | Mod: S$GLB,,, | Performed by: INTERNAL MEDICINE

## 2021-06-14 PROCEDURE — 85025 COMPLETE CBC W/AUTO DIFF WBC: CPT | Performed by: INTERNAL MEDICINE

## 2021-06-14 PROCEDURE — 99214 OFFICE O/P EST MOD 30 MIN: CPT | Mod: S$GLB,,, | Performed by: INTERNAL MEDICINE

## 2021-06-14 PROCEDURE — 99999 PR PBB SHADOW E&M-EST. PATIENT-LVL III: ICD-10-PCS | Mod: PBBFAC,,, | Performed by: INTERNAL MEDICINE

## 2021-06-14 PROCEDURE — 36415 COLL VENOUS BLD VENIPUNCTURE: CPT | Performed by: INTERNAL MEDICINE

## 2021-06-28 ENCOUNTER — OFFICE VISIT (OUTPATIENT)
Dept: PSYCHIATRY | Facility: CLINIC | Age: 51
End: 2021-06-28
Payer: COMMERCIAL

## 2021-06-28 VITALS
HEART RATE: 92 BPM | BODY MASS INDEX: 29.64 KG/M2 | WEIGHT: 206.56 LBS | DIASTOLIC BLOOD PRESSURE: 86 MMHG | SYSTOLIC BLOOD PRESSURE: 126 MMHG

## 2021-06-28 DIAGNOSIS — F14.10 COCAINE ABUSE: ICD-10-CM

## 2021-06-28 DIAGNOSIS — F31.9 BIPOLAR 1 DISORDER, DEPRESSED: Primary | ICD-10-CM

## 2021-06-28 DIAGNOSIS — G47.00 INSOMNIA, UNSPECIFIED TYPE: ICD-10-CM

## 2021-06-28 DIAGNOSIS — F40.10 SOCIAL ANXIETY DISORDER: ICD-10-CM

## 2021-06-28 DIAGNOSIS — F90.2 ADHD (ATTENTION DEFICIT HYPERACTIVITY DISORDER), COMBINED TYPE: ICD-10-CM

## 2021-06-28 DIAGNOSIS — F10.11 ALCOHOL ABUSE, IN REMISSION: ICD-10-CM

## 2021-06-28 DIAGNOSIS — F43.21 GRIEF: Primary | ICD-10-CM

## 2021-06-28 DIAGNOSIS — F17.220 NICOTINE DEPENDENCE, CHEWING TOBACCO, UNCOMPLICATED: ICD-10-CM

## 2021-06-28 DIAGNOSIS — F31.9 BIPOLAR 1 DISORDER, DEPRESSED: ICD-10-CM

## 2021-06-28 PROCEDURE — 90832 PSYTX W PT 30 MINUTES: CPT | Mod: S$GLB,,, | Performed by: SOCIAL WORKER

## 2021-06-28 PROCEDURE — 99214 OFFICE O/P EST MOD 30 MIN: CPT | Mod: S$GLB,,, | Performed by: PSYCHIATRY & NEUROLOGY

## 2021-06-28 PROCEDURE — 99214 PR OFFICE/OUTPT VISIT, EST, LEVL IV, 30-39 MIN: ICD-10-PCS | Mod: S$GLB,,, | Performed by: PSYCHIATRY & NEUROLOGY

## 2021-06-28 PROCEDURE — 99999 PR PBB SHADOW E&M-EST. PATIENT-LVL II: CPT | Mod: PBBFAC,,, | Performed by: PSYCHIATRY & NEUROLOGY

## 2021-06-28 PROCEDURE — 99999 PR PBB SHADOW E&M-EST. PATIENT-LVL II: ICD-10-PCS | Mod: PBBFAC,,, | Performed by: PSYCHIATRY & NEUROLOGY

## 2021-06-28 PROCEDURE — 90832 PR PSYCHOTHERAPY W/PATIENT, 30 MIN: ICD-10-PCS | Mod: S$GLB,,, | Performed by: SOCIAL WORKER

## 2021-06-28 RX ORDER — DEXTROAMPHETAMINE SACCHARATE, AMPHETAMINE ASPARTATE MONOHYDRATE, DEXTROAMPHETAMINE SULFATE AND AMPHETAMINE SULFATE 7.5; 7.5; 7.5; 7.5 MG/1; MG/1; MG/1; MG/1
60 CAPSULE, EXTENDED RELEASE ORAL EVERY MORNING
Qty: 60 CAPSULE | Refills: 0 | Status: SHIPPED | OUTPATIENT
Start: 2021-09-03 | End: 2021-07-06

## 2021-06-28 RX ORDER — LAMOTRIGINE 200 MG/1
200 TABLET ORAL DAILY
Qty: 30 TABLET | Refills: 2 | Status: SHIPPED | OUTPATIENT
Start: 2021-06-28 | End: 2021-08-02 | Stop reason: SDUPTHER

## 2021-06-28 RX ORDER — DEXTROAMPHETAMINE SACCHARATE, AMPHETAMINE ASPARTATE MONOHYDRATE, DEXTROAMPHETAMINE SULFATE AND AMPHETAMINE SULFATE 7.5; 7.5; 7.5; 7.5 MG/1; MG/1; MG/1; MG/1
60 CAPSULE, EXTENDED RELEASE ORAL EVERY MORNING
Qty: 60 CAPSULE | Refills: 0 | Status: SHIPPED | OUTPATIENT
Start: 2021-08-04 | End: 2021-07-06

## 2021-06-28 RX ORDER — DEXTROAMPHETAMINE SACCHARATE, AMPHETAMINE ASPARTATE MONOHYDRATE, DEXTROAMPHETAMINE SULFATE AND AMPHETAMINE SULFATE 7.5; 7.5; 7.5; 7.5 MG/1; MG/1; MG/1; MG/1
60 CAPSULE, EXTENDED RELEASE ORAL EVERY MORNING
Qty: 60 CAPSULE | Refills: 0 | Status: SHIPPED | OUTPATIENT
Start: 2021-07-05 | End: 2021-07-06

## 2021-06-28 RX ORDER — ZOLPIDEM TARTRATE 10 MG/1
10 TABLET ORAL NIGHTLY PRN
Qty: 30 TABLET | Refills: 2 | Status: SHIPPED | OUTPATIENT
Start: 2021-07-05 | End: 2021-08-02 | Stop reason: SDUPTHER

## 2021-06-30 ENCOUNTER — PATIENT MESSAGE (OUTPATIENT)
Dept: PSYCHIATRY | Facility: CLINIC | Age: 51
End: 2021-06-30

## 2021-06-30 ENCOUNTER — TELEPHONE (OUTPATIENT)
Dept: PSYCHIATRY | Facility: CLINIC | Age: 51
End: 2021-06-30

## 2021-07-05 ENCOUNTER — PATIENT MESSAGE (OUTPATIENT)
Dept: PSYCHIATRY | Facility: CLINIC | Age: 51
End: 2021-07-05

## 2021-07-06 DIAGNOSIS — F90.2 ADHD (ATTENTION DEFICIT HYPERACTIVITY DISORDER), COMBINED TYPE: Primary | ICD-10-CM

## 2021-07-06 RX ORDER — DEXTROAMPHETAMINE SACCHARATE, AMPHETAMINE ASPARTATE MONOHYDRATE, DEXTROAMPHETAMINE SULFATE AND AMPHETAMINE SULFATE 7.5; 7.5; 7.5; 7.5 MG/1; MG/1; MG/1; MG/1
60 CAPSULE, EXTENDED RELEASE ORAL DAILY
Qty: 60 CAPSULE | Refills: 0 | Status: SHIPPED | OUTPATIENT
Start: 2021-09-03 | End: 2021-08-02

## 2021-07-06 RX ORDER — DEXTROAMPHETAMINE SACCHARATE, AMPHETAMINE ASPARTATE MONOHYDRATE, DEXTROAMPHETAMINE SULFATE AND AMPHETAMINE SULFATE 7.5; 7.5; 7.5; 7.5 MG/1; MG/1; MG/1; MG/1
60 CAPSULE, EXTENDED RELEASE ORAL DAILY
Qty: 60 CAPSULE | Refills: 0 | Status: SHIPPED | OUTPATIENT
Start: 2021-08-05 | End: 2021-08-02

## 2021-07-06 RX ORDER — DEXTROAMPHETAMINE SACCHARATE, AMPHETAMINE ASPARTATE, DEXTROAMPHETAMINE SULFATE AND AMPHETAMINE SULFATE 7.5; 7.5; 7.5; 7.5 MG/1; MG/1; MG/1; MG/1
30 TABLET ORAL 2 TIMES DAILY
Qty: 60 TABLET | Refills: 0 | Status: SHIPPED | OUTPATIENT
Start: 2021-07-06 | End: 2021-08-05

## 2021-08-02 ENCOUNTER — OFFICE VISIT (OUTPATIENT)
Dept: PSYCHIATRY | Facility: CLINIC | Age: 51
End: 2021-08-02
Payer: COMMERCIAL

## 2021-08-02 VITALS
DIASTOLIC BLOOD PRESSURE: 84 MMHG | HEART RATE: 93 BPM | WEIGHT: 205.25 LBS | BODY MASS INDEX: 29.45 KG/M2 | SYSTOLIC BLOOD PRESSURE: 123 MMHG

## 2021-08-02 DIAGNOSIS — F90.2 ADHD (ATTENTION DEFICIT HYPERACTIVITY DISORDER), COMBINED TYPE: ICD-10-CM

## 2021-08-02 DIAGNOSIS — G47.00 INSOMNIA, UNSPECIFIED TYPE: ICD-10-CM

## 2021-08-02 DIAGNOSIS — F10.11 ALCOHOL ABUSE, IN REMISSION: ICD-10-CM

## 2021-08-02 DIAGNOSIS — F17.220 NICOTINE DEPENDENCE, CHEWING TOBACCO, UNCOMPLICATED: ICD-10-CM

## 2021-08-02 DIAGNOSIS — F40.10 SOCIAL ANXIETY DISORDER: ICD-10-CM

## 2021-08-02 DIAGNOSIS — F31.9 BIPOLAR 1 DISORDER, DEPRESSED: Primary | ICD-10-CM

## 2021-08-02 DIAGNOSIS — F14.10 COCAINE ABUSE: ICD-10-CM

## 2021-08-02 PROCEDURE — 99999 PR PBB SHADOW E&M-EST. PATIENT-LVL II: CPT | Mod: PBBFAC,,, | Performed by: PSYCHIATRY & NEUROLOGY

## 2021-08-02 PROCEDURE — 99999 PR PBB SHADOW E&M-EST. PATIENT-LVL II: ICD-10-PCS | Mod: PBBFAC,,, | Performed by: PSYCHIATRY & NEUROLOGY

## 2021-08-02 PROCEDURE — 99214 OFFICE O/P EST MOD 30 MIN: CPT | Mod: S$GLB,,, | Performed by: PSYCHIATRY & NEUROLOGY

## 2021-08-02 PROCEDURE — 99214 PR OFFICE/OUTPT VISIT, EST, LEVL IV, 30-39 MIN: ICD-10-PCS | Mod: S$GLB,,, | Performed by: PSYCHIATRY & NEUROLOGY

## 2021-08-02 RX ORDER — LAMOTRIGINE 200 MG/1
200 TABLET ORAL DAILY
Qty: 30 TABLET | Refills: 2 | Status: SHIPPED | OUTPATIENT
Start: 2021-08-02 | End: 2021-10-18 | Stop reason: SDUPTHER

## 2021-08-02 RX ORDER — DEXTROAMPHETAMINE SACCHARATE, AMPHETAMINE ASPARTATE MONOHYDRATE, DEXTROAMPHETAMINE SULFATE AND AMPHETAMINE SULFATE 7.5; 7.5; 7.5; 7.5 MG/1; MG/1; MG/1; MG/1
60 CAPSULE, EXTENDED RELEASE ORAL DAILY
Qty: 60 CAPSULE | Refills: 0 | Status: SHIPPED | OUTPATIENT
Start: 2021-10-04 | End: 2021-10-18 | Stop reason: SDUPTHER

## 2021-08-02 RX ORDER — DEXTROAMPHETAMINE SACCHARATE, AMPHETAMINE ASPARTATE MONOHYDRATE, DEXTROAMPHETAMINE SULFATE AND AMPHETAMINE SULFATE 7.5; 7.5; 7.5; 7.5 MG/1; MG/1; MG/1; MG/1
60 CAPSULE, EXTENDED RELEASE ORAL DAILY
Qty: 60 CAPSULE | Refills: 0 | Status: SHIPPED | OUTPATIENT
Start: 2021-09-04 | End: 2021-10-04

## 2021-08-02 RX ORDER — ZOLPIDEM TARTRATE 10 MG/1
10 TABLET ORAL NIGHTLY PRN
Qty: 30 TABLET | Refills: 2 | Status: SHIPPED | OUTPATIENT
Start: 2021-08-04 | End: 2021-10-18 | Stop reason: SDUPTHER

## 2021-08-02 RX ORDER — DEXTROAMPHETAMINE SACCHARATE, AMPHETAMINE ASPARTATE MONOHYDRATE, DEXTROAMPHETAMINE SULFATE AND AMPHETAMINE SULFATE 7.5; 7.5; 7.5; 7.5 MG/1; MG/1; MG/1; MG/1
60 CAPSULE, EXTENDED RELEASE ORAL DAILY
Qty: 60 CAPSULE | Refills: 0 | Status: SHIPPED | OUTPATIENT
Start: 2021-08-06 | End: 2021-09-05

## 2021-08-23 ENCOUNTER — OFFICE VISIT (OUTPATIENT)
Dept: PSYCHIATRY | Facility: CLINIC | Age: 51
End: 2021-08-23
Payer: COMMERCIAL

## 2021-08-23 DIAGNOSIS — F31.9 BIPOLAR 1 DISORDER, DEPRESSED: Primary | ICD-10-CM

## 2021-08-23 PROCEDURE — 90832 PSYTX W PT 30 MINUTES: CPT | Mod: S$GLB,,, | Performed by: SOCIAL WORKER

## 2021-08-23 PROCEDURE — 90832 PR PSYCHOTHERAPY W/PATIENT, 30 MIN: ICD-10-PCS | Mod: S$GLB,,, | Performed by: SOCIAL WORKER

## 2021-09-13 ENCOUNTER — OFFICE VISIT (OUTPATIENT)
Dept: PSYCHIATRY | Facility: CLINIC | Age: 51
End: 2021-09-13
Payer: COMMERCIAL

## 2021-09-13 DIAGNOSIS — F31.9 BIPOLAR 1 DISORDER, DEPRESSED: Primary | ICD-10-CM

## 2021-09-13 PROCEDURE — 90834 PR PSYCHOTHERAPY W/PATIENT, 45 MIN: ICD-10-PCS | Mod: S$GLB,,, | Performed by: SOCIAL WORKER

## 2021-09-13 PROCEDURE — 90834 PSYTX W PT 45 MINUTES: CPT | Mod: S$GLB,,, | Performed by: SOCIAL WORKER

## 2021-09-24 ENCOUNTER — PATIENT MESSAGE (OUTPATIENT)
Dept: PSYCHIATRY | Facility: CLINIC | Age: 51
End: 2021-09-24

## 2021-10-11 ENCOUNTER — OFFICE VISIT (OUTPATIENT)
Dept: PSYCHIATRY | Facility: CLINIC | Age: 51
End: 2021-10-11
Payer: COMMERCIAL

## 2021-10-11 DIAGNOSIS — F31.9 BIPOLAR 1 DISORDER, DEPRESSED: Primary | ICD-10-CM

## 2021-10-11 PROCEDURE — 90834 PR PSYCHOTHERAPY W/PATIENT, 45 MIN: ICD-10-PCS | Mod: S$GLB,,, | Performed by: SOCIAL WORKER

## 2021-10-11 PROCEDURE — 90834 PSYTX W PT 45 MINUTES: CPT | Mod: S$GLB,,, | Performed by: SOCIAL WORKER

## 2021-10-18 ENCOUNTER — OFFICE VISIT (OUTPATIENT)
Dept: PSYCHIATRY | Facility: CLINIC | Age: 51
End: 2021-10-18
Payer: COMMERCIAL

## 2021-10-18 DIAGNOSIS — G47.00 INSOMNIA, UNSPECIFIED TYPE: ICD-10-CM

## 2021-10-18 DIAGNOSIS — F40.10 SOCIAL ANXIETY DISORDER: ICD-10-CM

## 2021-10-18 DIAGNOSIS — Z63.9 FAMILY DYNAMICS PROBLEM: ICD-10-CM

## 2021-10-18 DIAGNOSIS — F31.9 BIPOLAR 1 DISORDER, DEPRESSED: Primary | ICD-10-CM

## 2021-10-18 DIAGNOSIS — F90.2 ADHD (ATTENTION DEFICIT HYPERACTIVITY DISORDER), COMBINED TYPE: ICD-10-CM

## 2021-10-18 DIAGNOSIS — F10.11 ALCOHOL ABUSE, IN REMISSION: ICD-10-CM

## 2021-10-18 DIAGNOSIS — F43.21 GRIEF: ICD-10-CM

## 2021-10-18 DIAGNOSIS — F17.220 NICOTINE DEPENDENCE, CHEWING TOBACCO, UNCOMPLICATED: ICD-10-CM

## 2021-10-18 DIAGNOSIS — F14.10 COCAINE ABUSE: ICD-10-CM

## 2021-10-18 PROCEDURE — 99214 OFFICE O/P EST MOD 30 MIN: CPT | Mod: 95,,, | Performed by: PSYCHIATRY & NEUROLOGY

## 2021-10-18 PROCEDURE — 99214 PR OFFICE/OUTPT VISIT, EST, LEVL IV, 30-39 MIN: ICD-10-PCS | Mod: 95,,, | Performed by: PSYCHIATRY & NEUROLOGY

## 2021-10-18 RX ORDER — ZOLPIDEM TARTRATE 10 MG/1
10 TABLET ORAL NIGHTLY PRN
Qty: 30 TABLET | Refills: 2 | Status: SHIPPED | OUTPATIENT
Start: 2021-11-03 | End: 2022-01-03 | Stop reason: SDUPTHER

## 2021-10-18 RX ORDER — DEXTROAMPHETAMINE SACCHARATE, AMPHETAMINE ASPARTATE MONOHYDRATE, DEXTROAMPHETAMINE SULFATE AND AMPHETAMINE SULFATE 7.5; 7.5; 7.5; 7.5 MG/1; MG/1; MG/1; MG/1
60 CAPSULE, EXTENDED RELEASE ORAL DAILY
Qty: 60 CAPSULE | Refills: 0 | Status: SHIPPED | OUTPATIENT
Start: 2021-12-03 | End: 2022-01-02

## 2021-10-18 RX ORDER — DEXTROAMPHETAMINE SACCHARATE, AMPHETAMINE ASPARTATE MONOHYDRATE, DEXTROAMPHETAMINE SULFATE AND AMPHETAMINE SULFATE 7.5; 7.5; 7.5; 7.5 MG/1; MG/1; MG/1; MG/1
60 CAPSULE, EXTENDED RELEASE ORAL DAILY
Qty: 60 CAPSULE | Refills: 0 | Status: SHIPPED | OUTPATIENT
Start: 2021-12-31 | End: 2022-01-03 | Stop reason: SDUPTHER

## 2021-10-18 RX ORDER — DEXTROAMPHETAMINE SACCHARATE, AMPHETAMINE ASPARTATE MONOHYDRATE, DEXTROAMPHETAMINE SULFATE AND AMPHETAMINE SULFATE 7.5; 7.5; 7.5; 7.5 MG/1; MG/1; MG/1; MG/1
60 CAPSULE, EXTENDED RELEASE ORAL DAILY
Qty: 60 CAPSULE | Refills: 0 | Status: SHIPPED | OUTPATIENT
Start: 2021-11-03 | End: 2021-12-03

## 2021-10-18 RX ORDER — LAMOTRIGINE 200 MG/1
200 TABLET ORAL DAILY
Qty: 30 TABLET | Refills: 2 | Status: SHIPPED | OUTPATIENT
Start: 2021-10-18 | End: 2022-01-03 | Stop reason: SDUPTHER

## 2021-10-18 SDOH — SOCIAL DETERMINANTS OF HEALTH (SDOH): PROBLEM RELATED TO PRIMARY SUPPORT GROUP, UNSPECIFIED: Z63.9

## 2021-10-25 ENCOUNTER — OFFICE VISIT (OUTPATIENT)
Dept: PSYCHIATRY | Facility: CLINIC | Age: 51
End: 2021-10-25
Payer: COMMERCIAL

## 2021-10-25 DIAGNOSIS — F31.9 BIPOLAR 1 DISORDER, DEPRESSED: Primary | ICD-10-CM

## 2021-10-25 PROCEDURE — 90834 PSYTX W PT 45 MINUTES: CPT | Mod: S$GLB,,, | Performed by: SOCIAL WORKER

## 2021-10-25 PROCEDURE — 90834 PR PSYCHOTHERAPY W/PATIENT, 45 MIN: ICD-10-PCS | Mod: S$GLB,,, | Performed by: SOCIAL WORKER

## 2021-11-08 ENCOUNTER — PATIENT MESSAGE (OUTPATIENT)
Dept: PSYCHIATRY | Facility: CLINIC | Age: 51
End: 2021-11-08

## 2021-12-17 ENCOUNTER — OFFICE VISIT (OUTPATIENT)
Dept: PSYCHIATRY | Facility: CLINIC | Age: 51
End: 2021-12-17
Payer: COMMERCIAL

## 2021-12-17 DIAGNOSIS — F31.9 BIPOLAR 1 DISORDER, DEPRESSED: Primary | ICD-10-CM

## 2021-12-17 PROCEDURE — 90834 PR PSYCHOTHERAPY W/PATIENT, 45 MIN: ICD-10-PCS | Mod: S$GLB,,, | Performed by: SOCIAL WORKER

## 2021-12-17 PROCEDURE — 90834 PSYTX W PT 45 MINUTES: CPT | Mod: S$GLB,,, | Performed by: SOCIAL WORKER

## 2021-12-27 ENCOUNTER — PATIENT MESSAGE (OUTPATIENT)
Dept: PSYCHIATRY | Facility: CLINIC | Age: 51
End: 2021-12-27
Payer: COMMERCIAL

## 2021-12-27 ENCOUNTER — OFFICE VISIT (OUTPATIENT)
Dept: PSYCHIATRY | Facility: CLINIC | Age: 51
End: 2021-12-27
Payer: COMMERCIAL

## 2021-12-27 DIAGNOSIS — F31.9 BIPOLAR 1 DISORDER, DEPRESSED: Primary | ICD-10-CM

## 2021-12-27 PROCEDURE — 90834 PSYTX W PT 45 MINUTES: CPT | Mod: S$GLB,,, | Performed by: SOCIAL WORKER

## 2021-12-27 PROCEDURE — 90834 PR PSYCHOTHERAPY W/PATIENT, 45 MIN: ICD-10-PCS | Mod: S$GLB,,, | Performed by: SOCIAL WORKER

## 2022-01-03 ENCOUNTER — OFFICE VISIT (OUTPATIENT)
Dept: PSYCHIATRY | Facility: CLINIC | Age: 52
End: 2022-01-03
Payer: COMMERCIAL

## 2022-01-03 DIAGNOSIS — F10.11 ALCOHOL ABUSE, IN REMISSION: ICD-10-CM

## 2022-01-03 DIAGNOSIS — G47.00 INSOMNIA, UNSPECIFIED TYPE: ICD-10-CM

## 2022-01-03 DIAGNOSIS — F17.220 NICOTINE DEPENDENCE, CHEWING TOBACCO, UNCOMPLICATED: ICD-10-CM

## 2022-01-03 DIAGNOSIS — F31.9 BIPOLAR 1 DISORDER, DEPRESSED: Primary | ICD-10-CM

## 2022-01-03 DIAGNOSIS — F14.10 COCAINE ABUSE: ICD-10-CM

## 2022-01-03 DIAGNOSIS — F43.21 GRIEF: ICD-10-CM

## 2022-01-03 DIAGNOSIS — F90.2 ADHD (ATTENTION DEFICIT HYPERACTIVITY DISORDER), COMBINED TYPE: ICD-10-CM

## 2022-01-03 DIAGNOSIS — F40.10 SOCIAL ANXIETY DISORDER: ICD-10-CM

## 2022-01-03 DIAGNOSIS — Z63.9 FAMILY DYNAMICS PROBLEM: ICD-10-CM

## 2022-01-03 PROCEDURE — 99214 OFFICE O/P EST MOD 30 MIN: CPT | Mod: 95,,, | Performed by: PSYCHIATRY & NEUROLOGY

## 2022-01-03 PROCEDURE — 99214 PR OFFICE/OUTPT VISIT, EST, LEVL IV, 30-39 MIN: ICD-10-PCS | Mod: 95,,, | Performed by: PSYCHIATRY & NEUROLOGY

## 2022-01-03 RX ORDER — DEXTROAMPHETAMINE SACCHARATE, AMPHETAMINE ASPARTATE MONOHYDRATE, DEXTROAMPHETAMINE SULFATE AND AMPHETAMINE SULFATE 7.5; 7.5; 7.5; 7.5 MG/1; MG/1; MG/1; MG/1
60 CAPSULE, EXTENDED RELEASE ORAL DAILY
Qty: 60 CAPSULE | Refills: 0 | Status: SHIPPED | OUTPATIENT
Start: 2022-03-30 | End: 2022-03-28 | Stop reason: SDUPTHER

## 2022-01-03 RX ORDER — DEXTROAMPHETAMINE SACCHARATE, AMPHETAMINE ASPARTATE MONOHYDRATE, DEXTROAMPHETAMINE SULFATE AND AMPHETAMINE SULFATE 7.5; 7.5; 7.5; 7.5 MG/1; MG/1; MG/1; MG/1
60 CAPSULE, EXTENDED RELEASE ORAL DAILY
Qty: 60 CAPSULE | Refills: 0 | Status: SHIPPED | OUTPATIENT
Start: 2022-01-29 | End: 2022-02-28

## 2022-01-03 RX ORDER — LAMOTRIGINE 200 MG/1
200 TABLET ORAL DAILY
Qty: 30 TABLET | Refills: 2 | Status: SHIPPED | OUTPATIENT
Start: 2022-01-03 | End: 2022-03-28 | Stop reason: SDUPTHER

## 2022-01-03 RX ORDER — ZOLPIDEM TARTRATE 10 MG/1
10 TABLET ORAL NIGHTLY PRN
Qty: 30 TABLET | Refills: 2 | Status: SHIPPED | OUTPATIENT
Start: 2022-01-29 | End: 2022-03-28 | Stop reason: SDUPTHER

## 2022-01-03 RX ORDER — DEXTROAMPHETAMINE SACCHARATE, AMPHETAMINE ASPARTATE MONOHYDRATE, DEXTROAMPHETAMINE SULFATE AND AMPHETAMINE SULFATE 7.5; 7.5; 7.5; 7.5 MG/1; MG/1; MG/1; MG/1
60 CAPSULE, EXTENDED RELEASE ORAL DAILY
Qty: 60 CAPSULE | Refills: 0 | Status: SHIPPED | OUTPATIENT
Start: 2022-02-28 | End: 2022-03-30

## 2022-01-03 SDOH — SOCIAL DETERMINANTS OF HEALTH (SDOH): PROBLEM RELATED TO PRIMARY SUPPORT GROUP, UNSPECIFIED: Z63.9

## 2022-01-03 NOTE — PROGRESS NOTES
The patient location is:  IN CLINIC    Andrei Casanova   1970 01/03/2022     Disclaimer: Evaluation and treatment is based on information presented to date. Any new information may affect assessment and findings.    The patient location is: Patient' shome   and reported  that his/her location at the time of this visit was in the [facility]     Visit type: Virtual visit with synchronous audio and video     Each patient to whom he or she provides medical services by telemedicine is: (1) informed of the relationship between the physician and patient and the respective role of any other health care provider with respect to management of the patient; and (2) notified that he or she may decline to receive medical services by telemedicine and may withdraw from such care at any time.    Patient was informed that I am a physician who is licensed in the [facility]:  Kyree Bhatt MD:  Employed by   [employer]     If technology issues arise: YUAN Bhatt MD will attempt to call pt back tho pt also instructed that he/she may  call our office phone at: [phone]    Pt informed that if he / she is ever in crisis (or has acute concerns): pt is instructed to Dial 911 or go to nearest Emergency Room (ER)    Pt informed that if questions related to privacy practices: pt is instructed to contact [facility] Information Department: [phone]    Understanding Expressed. No questions.    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 ; history alcohol and substance use; reports speaks to sponsor daily ; reports substance and alcohol free    Who (in attendance) :  Pt himself     S: Patient's Own Perception of Condition (& Side Effects) : none ; says  no chest pain, no lightheadedness nor other reported difficulty ; says very productive and no side effects reported     O:   Remains Adderall XR 30 mg (TWO)  Daily ; remains on Lamictal ; no rash;  likes it     Medication:  D/C Abilify  10  "lb (wt gain)    then D/C Saphris (muscle stiffness)  Zyprexa (sluggish and blah); weight improved back down to 205 lb    Continues with Lamictal and Adderall XR 30  Mg (2 a day);  And Ambien 10 mg    Reports remains 'clean / sober' saysspeaks to sponsor everyday     Says work going fine; has been off x 1 month     Had nice X mas ; 1st after mother passing ; he and dad (who lives [place] area had niec visit / bonded well    Working with  Social Work (Chris Alberto LCSW) : Includes in part Familiy of Origin issues and  BROTHER passed 2020 ALSO of Covid. MOM PASSED AWAY of COVID  as [place] for his mother's  even during covid     Mr Casanova enjoys  / and finds helpful working with Chris Alberto LCSW ; excerpt of Mr Alberto RHONDALASHONDA note (21):Interval history and content of current session:  Patient presents to ongoing individual therapy due to recovery from addiction, depression, and anxiety.  He had his first [place] without his mother and brother.  They both  several weeks apart at the beginning of last year due to COVID.  It was the first [place] without them.  The patient and his father were nervous about how the holiday would go.  They went to his oldest niece's home.  He recalls how his brother was mean to his niece.  She had a child out of wedlock and his brother called her a "whore."  He then kicked her out of the home.  The patient and his father helped her to find a trailer to put on family land.  Her  is now doing well in business.  He is a contract  in the chemical plants.  His niece continued the tradition his mother had of giving stockings to the family.  The patient had tried to get his mother to stop in the past due to the cost.  She told the patient to be quiet because the gift giving gave her gavino.  His niece gave him floss in his stocking.  He is constantly looking for floss and he thought the gift was thoughtful.  The patient notes " "that his brother did not practice what he preached.  The patient was also emotional in the candlelight service on  because he recalled how his mother would sing.  Emphasize that his niece was memorializing her grandmother in her actions.  Note that his father has significant anxiety.  Encourage the patient to begin to work on forgiveness with his brother.  Educate the patient about what forgiveness is not.  He has realized that he enjoys being productive in his off time.  His ex wife gave him a picture of his mother.  In the picture, she was [age] and smiling while hanging from a beam.  He has realized since her death that she was always happy.  He brought his father to the meeting at [employer] on [place] Day.  His father enjoys seeing how the patient helps others.  He shares that his ex wife's mother  from liver failure at a young age because she was an alcoholic.  He notes that his mother in law was an attractive woman when she was young, but she was bloated near her death.  He plans to be at a "meeting a thon" on New Year's Sophie.  He is tearful several times in session discussing his mother.    Back volunteering at [employer] (Andrei Briones counselor) ; he liked such ; goes to the one on [address]    Depression Patient Health Questionnaire 1/3/2022 10/18/2021 2021 2021 3/26/2018 10/23/2017   Over the last two weeks how often have you been bothered by little interest or pleasure in doing things 0 0 1 3 0 0   Over the last two weeks how often have you been bothered by feeling down, depressed or hopeless 1 0 0 1 0 2   PHQ-2 Total Score 1 0 1 4 0 2   Over the last two weeks how often have you been bothered by trouble falling or staying asleep, or sleeping too much 0 0 0 2 - -   Over the last two weeks how often have you been bothered by feeling tired or having little energy 1 0 1 3 - -   Over the last two weeks how often have you been bothered by a poor appetite or overeating 0 0 0 3 - -   Over " the last two weeks how often have you been bothered by feeling bad about yourself - or that you are a failure or have let yourself or your family down 0 1 1 2 - -   Over the last two weeks how often have you been bothered by trouble concentrating on things, such as reading the newspaper or watching television 1 0 0 2 - -   Over the last two weeks how often have you been bothered by moving or speaking so slowly that other people could have noticed. Or the opposite - being so fidgety or restless that you have been moving around a lot more than usual. 1 1 0 0 - -   Over the last two weeks how often have you been bothered by thoughts that you would be better off dead, or of hurting yourself 0 0 0 0 - -   If you checked off any problems, how difficult have these problems made it for you to do your work, take care of things at home or get along with other people? Not difficult at all Somewhat difficult Somewhat difficult Somewhat difficult - -   Total Score 4 2 3 16 - -   Interpretation Minimal or None Minimal or None Minimal or None Moderately Severe - -     GAD7 1/3/2022 12/17/2021 11/4/2021   1. Feeling nervous, anxious, or on edge? 1 1 2   2. Not being able to stop or control worrying? 1 1 1   3. Worrying too much about different things? 1 1 1   4. Trouble relaxing? 0 1 1   5. Being so restless that it is hard to sit still? 0 1 1   6. Becoming easily annoyed or irritable? 1 0 1   7. Feeling afraid as if something awful might happen? 0 1 1   8. If you checked off any problems, how difficult have these problems made it for you to do your work, take care of things at home, or get along with other people? - - -   MATTHEW-7 Score 4 6 8     Safety:no SI / no HI     Supports: enjoys his daughter ; she graduated in  journalism recently; (he is concerned about her seizure history)     Work / School     Activities tends like more solitarty activities    Counselor/ Coping Skills / Counseling : workig with Chris Alberto  "LCSW    Staying "Sober / "clean"  (I.e., Substance issue): says has sponsor / and participates some in AA    Constitutional Health Concerns:       Review of Systems   HENT: Negative.    Eyes: Negative.    Respiratory: Negative.    Cardiovascular: Negative.    Gastrointestinal: Negative.    Genitourinary: Negative.    Musculoskeletal: Negative.    Skin: Negative.    Neurological: Negative.    Endo/Heme/Allergies: Negative.       Musculoskeletal: no tremor no stiffness       Patient Active Problem List   Diagnosis    ADD (attention deficit disorder)    Bipolar affective    HTN (hypertension)    Alcohol abuse, in remission    Chews tobacco regularly    Mixed hyperlipidemia    Colon cancer screening    Special screening for malignant neoplasms, colon    Family history of colonic polyps    Bipolar 1 disorder, depressed    Social anxiety disorder    ADHD (attention deficit hyperactivity disorder), combined type    Cocaine abuse; full remission x years    Nicotine dependence, chewing tobacco, uncomplicated    Insomnia    Grief: Mom  Covid Dec 2020    Erythrocytosis    Family dynamic issues : dad's health, 16 yr old daughter living with ex wife / has seizures, other (see SW notes)          Current Outpatient Medications:     amLODIPine (NORVASC) 5 MG tablet, TAKE 1 TABLET BY MOUTH DAILY, Disp: 30 tablet, Rfl: 9    [START ON 2022] dextroamphetamine-amphetamine (ADDERALL XR) 30 MG 24 hr capsule, Take 2 capsules (60 mg total) by mouth once daily., Disp: 60 capsule, Rfl: 0    [START ON 2022] dextroamphetamine-amphetamine (ADDERALL XR) 30 MG 24 hr capsule, Take 2 capsules (60 mg total) by mouth once daily., Disp: 60 capsule, Rfl: 0    [START ON 3/30/2022] dextroamphetamine-amphetamine (ADDERALL XR) 30 MG 24 hr capsule, Take 2 capsules (60 mg total) by mouth once daily., Disp: 60 capsule, Rfl: 0    lamoTRIgine (LAMICTAL) 200 MG tablet, Take 1 tablet (200 mg total) by mouth once daily. STOP all " Lamictal IF any RASH and tell Psyc MD, Disp: 30 tablet, Rfl: 2    lisinopriL 10 MG tablet, TAKE 1 TABLET BY MOUTH DAILY, Disp: 30 tablet, Rfl: 9    [START ON 2022] zolpidem (AMBIEN) 10 mg Tab, Take 1 tablet (10 mg total) by mouth nightly as needed (INSOMNIA)., Disp: 30 tablet, Rfl: 2     Social History     Tobacco Use   Smoking Status Former Smoker    Packs/day: 1.00    Years: 15.00    Pack years: 15.00    Quit date: 2009    Years since quittin.8   Smokeless Tobacco Never Used        Review of patient's allergies indicates:   Allergen Reactions    Abilify [aripiprazole] Other (See Comments)     Weight gain 10 lbs    Remeron [mirtazapine] Other (See Comments)     Increased hunger and felt disoriented    Saphris [asenapine] Other (See Comments)     Muscle stiffness    Zoloft [sertraline] Other (See Comments)     Sex dysfunction (anorgasmia)    Zyprexa [olanzapine] Other (See Comments)     sluggish and emotionally flat      Impulse Control: no history SI / nor HI    Mental Status Exam:   Appearance: casual   Oriented: x 3  Attitude: cooperative   Eye Contact: good   Behavior: calm / calm appearing / smiling at times   Mood: doing ok  Cognition: alert   Concentration: grossly intact   Affect: appropriate range   Anxiety: mild  Thought Process: goal directed   Speech: Volume : WNL   Quantity WNL   Quality: appears to openly answer questions   Eye Contact: good   Threats: no SI / no HI   Psychosis: denies all   Estimate of Intellectual Function: above average     ASSESSMENT:   Encounter Diagnoses   Name Primary?    Bipolar 1 disorder, depressed Yes    ADHD (attention deficit hyperactivity disorder), combined type     Social anxiety disorder     Insomnia, unspecified type     Grief: Mom  Covid Dec 2020     Family dynamic issues : dad's health, 16 yr old daughter living with ex wife / has seizures, other (see SW notes)     Alcohol abuse, in FULL REMISSION sober 1 year 2020; in past  1/5 th a day      Cocaine abuse; full REMISSION x years     Nicotine dependence, chewing tobacco, uncomplicated        Patient Instructions       PLAN:     Follow UP      3/28/2022  8:30 AM ESTABLISHED PATIENT - [facility] Kyree Bhatt MD     Also follow up Chris ELLIS as arranged    Discussed plan for subsequent 2022 appt to be in Clinic     Meds: see aftervisit summary / meds reviewed as prior     References: (reviewed with pt as well):    Anxiety &  phobia workbook by JULIANO Espitia PhD  (web retailers: used: $ 7-10)    Relaxation stress reduction workbook: ALFA Lal PhD ( used: $7-10)    Feeling Good Website: Kyree Sweeney MD / [email or web address] website (free) PODCASTS    [place]: Path to Better Sleep : https://www.veterantraining.va.gov/insomnia/ (free)     Pt expressed appreciation for the visit today and did not have further question at this time though pt  was still informed to:     Call  if problems.    Call / Report Side Effects to Psyc MD     Encouraged to follow up with primary care / Gen Med MD for continued monitoring of general health and wellness.    Understanding was expressed; and no further concerns nor questions were raised at this time.     remember healthy self care:   eat right  attempt adequate rest   HANDWASHING / encourage such paris. During this corona virus time   walk or light exercise within reason and as your general med team approves  read or explore any of reference materials / homework mentioned  reach out (I.e.,  connect with)  others who nuture and bring out best in you  avoid risky behaviors  keep your appointments  IF you  cannot make your appt THEN please call or go online to reschedule.  avoid  alcohol and illicit substances.  Look for the positive.  All is often relative-seek balance  Call sooner if needed : [phone]   Call 911 or go to Emergency Room  (ER)  if any acute concerns        >>BACKGROUND <<     Andrei collazo 51 y.o.  "Bipolar (depressed)  male presents today as referred by [facility] MD    Was seeing Carlos Barber MD for 8-10 yrs;he retired. 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 Dr Javed has continued on meds as from Dr Segovia    Pt has been maintained on:  on lamictal 200 mg (no rash)   adderall XR 30   ambien  5mg    Sober x 1 yr as of 7- via AA; prior to that 9 and 1/2 yr after relapsed    Says has sponsor    Says 'to be up front have had substance issues: alcohol and cocaine     Social anxiety ' big struggle for long time' ; more recently though time leave house; always struggle; people who not know think I outgoing; tho says push through it; makes feel present someone I am not    prior Juliette SCHNEIDER: tried paxil zoloft tho no clear recall of how did: interest to try /    Never abilify jack    Says he did Tried effexor XR (75 mg) difficult sex side effect;  Denies any suicidal ideation / nor gesture / nor any thoughts " "to harm other     No psychosis    Symptom Clusters:  Depressive Disorder: REPORTS depressed mood, irritable mood, appetite/weight change, sleep change, tired/fatigued, psychomotor change, worthlessness, concentration problems.   Anxiety Disorder: irritability, sleep problems, agoraphobia, excessive worry, avoidance symptoms.   Manic Disorder: REPORTS irritable mood, increased distractability, mixed with depression symptoms.   Psychotic Disorder: DENIES all.   Substance Use:  REPORTS alcohol, drugs, says as of 9- will be sober x 1 yr; in past: [facility]; in past up to 1/5 a day; aklso in past cocaine.   Physical or Sexual Abuse: DENIES all.       Self Rating Scales: (Such submitted for scanning) :     Quick Inventory of Depression (QID-Short Form):21  Zung Anxiety Index:78   Mood Disorder Questionnaire (MDQ): 8  The [place] Framework: a "bio-psycho-social" screening form for use as clinical raw data. / (submitted for scanning)      World Health Organization (W.H.O.) Adult ADHD Self Report Scale (ASRS-henrique 1.1):  5 of 6 core and 6 of 12 adjunctive    PAST PSYCHIATRIC HISTORY:     Inpatient: n  Outpatient: (incl. 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 avoid cannabis    When revert would isolate ; did for release ; drug choice was cocaine    Alcohol: last 9-8 2020;   Start: 10y  regular use: 12y UNTIL decaon (1st period sober) when left start back drinking  At 12y: every weekend: beer 6 12 oa  16y or " "17y DAILY / 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; 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 (2-6 beer)    Blackouts: deneis   No seizure:    Relationships: when drink / isolate / fear of work consequence IF they learned     Was drinking 5th a day (3 yr ago; "drank hard = 5th a day"); until passed out; had get up 4 a or 4;30a ; in fear breathalizer; combat by get [employer]     After deacon daily and coacine    Went rehab: [facility] 1 month 2008 ; 2 months prior: in [place]: 21 days (out pocket private); none other    When went [facility], self report drugs and alcohol; they said do not worry; when got back ; was sent psychiatrist pradip said lied on questionairre as he said not drinknig so terminated    2 and 1/2 yrs later: he contact [facility] ; says was wrongfully terminated as self report; go job back    Says for 1st 2 y back weekly drug test and random; and employer said go see psyc MD and go to counselor ; NOW just random.    Cannabis: 5th grade 1st  Try; 8th grade : at least 1x week; daily use: 16y until 18y then when came across    Tobacco:former ; stopped 2008 / start 21 yrs olf  Cocaine:years ago   Benzo: n  Opioid: n  Ecstasy:n  Other:n    Family History:  Family History   Problem Relation Age of Onset    Hypertension Mother     Hypertension Father     Diabetes Father     Heart disease Paternal Grandmother       Grandparent: Drug problems brother drug problem   Parents uncles / depression   Daughter depression    Mother and aunt history significant anxiety     3 wishes? :  Serenity, comfortable in skin , (20y) daughter not have seizures or migranes     PSYCHO-SOCIAL DEVELOPMENT HISTORY:     City Born: [facility]    Siblings (full or half)  Brothers: 1 older 2 1/2   Sisters: none    Parents : alive     Briefly Describe  your Mom: poor health controlling   Briefly Describe your Dad: poor health unhappy anxious    Bio Mom: Occupation:    Bio Dad:  " Occupation: car sales     Marital Status:  / x2 to same lady (15 y / 1995); 2 1/2 y Neida ([place], [place]) ; speak frequently ; she RN school     Children   Girls  (ages): 1 daughter 20 y: seizures migraines vertigo / depression anxiety / [employer] / 2nd yr journalism  Boys (ages):     Physical Abuse: N    Sexual abuse  N    Other trauma / abuse? 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 n

## 2022-01-03 NOTE — PATIENT INSTRUCTIONS
PLAN:     Follow UP      3/28/2022  8:30 AM ESTABLISHED PATIENT - [facility] Kyree Bhatt MD     Also follow up Chris Alberto LCSW as arranged    Discussed plan for subsequent 2022 appt to be in Clinic     Meds: see aftervisit summary / meds reviewed as prior     References: (reviewed with pt as well):    Anxiety &  phobia workbook by JULIANO Espitia PhD  (web retailers: used: $ 7-10)    Relaxation stress reduction workbook: ALFA Lal PhD ( used: $7-10)    Feeling Good Website: Kyree Sweeney MD / [email or web address] website (free) PODCASTS    [place]: Path to Better Sleep : https://www.veterantraining.va.gov/insomnia/ (free)     Pt expressed appreciation for the visit today and did not have further question at this time though pt  was still informed to:     Call  if problems.    Call / Report Side Effects to Psyc MD     Encouraged to follow up with primary care / Gen Med MD for continued monitoring of general health and wellness.    Understanding was expressed; and no further concerns nor questions were raised at this time.     remember healthy self care:   eat right  attempt adequate rest   HANDWASHING / encourage such [place]is. During this corona virus time   walk or light exercise within reason and as your general med team approves  read or explore any of reference materials / homework mentioned  reach out (I.e.,  connect with)  others who nuture and bring out best in you  avoid risky behaviors  keep your appointments  IF you  cannot make your appt THEN please call or go online to reschedule.  avoid  alcohol and illicit substances.  Look for the positive.  All is often relative-seek balance  Call sooner if needed : [phone]   Call 911 or go to Emergency Room  (ER)  if any acute concerns

## 2022-01-08 ENCOUNTER — PATIENT MESSAGE (OUTPATIENT)
Dept: PSYCHIATRY | Facility: CLINIC | Age: 52
End: 2022-01-08
Payer: COMMERCIAL

## 2022-01-10 ENCOUNTER — PATIENT MESSAGE (OUTPATIENT)
Dept: PSYCHIATRY | Facility: CLINIC | Age: 52
End: 2022-01-10
Payer: COMMERCIAL

## 2022-01-24 ENCOUNTER — OFFICE VISIT (OUTPATIENT)
Dept: PSYCHIATRY | Facility: CLINIC | Age: 52
End: 2022-01-24
Payer: COMMERCIAL

## 2022-01-24 DIAGNOSIS — F31.9 BIPOLAR 1 DISORDER, DEPRESSED: Primary | ICD-10-CM

## 2022-01-24 PROCEDURE — 90834 PR PSYCHOTHERAPY W/PATIENT, 45 MIN: ICD-10-PCS | Mod: S$GLB,,, | Performed by: SOCIAL WORKER

## 2022-01-24 PROCEDURE — 90834 PSYTX W PT 45 MINUTES: CPT | Mod: S$GLB,,, | Performed by: SOCIAL WORKER

## 2022-01-24 NOTE — PROGRESS NOTES
Individual Psychotherapy (PhD/LCSW)    2022    Site:  [place]         Therapeutic Intervention: Met with patient.  Outpatient - Insight oriented psychotherapy 45 min - CPT code 09859    Chief complaint/reason for encounter: addictive disorder, depression and anxiety     Interval history and content of current session:   Patient presents to ongoing individual therapy due to recovery from addiction, depression, and anxiety.  Today is a year since his brother  from COVID.  His mother's death anniversary was two weeks ago.  He and his father struggled with his mother's death.  His father has told the patient that he will talk to his mother's picture.  The patient and his father found out how his brother had been financially enabled by his mother.  There were substantial credit card debts.  Because his mother did not have a will, the credit card bills did not come out of her estate because she had none.  His father has been doing well medically.  The patient has been stressed at work.  His co worker has been in the position for two years, but he will ask the patient lots of questions and request his help.  The patient set a boundary Friday afternoon.  He told his co worker that he could not help him because he had his own work to complete.  He also has several co workers, who are leaving.  His company is not refilling the positions.  Encourage the patient to continue to set boundaries with toxic relationships.  Note that having work left over at the end of the day proves that he has a full time job.  Emphasize the need to continue to participate in results oriented activities.  He has his motorcycle street ready.  He went to help his daughter get established in her apartment in [place].  She has not had a seizure for a month.  He was angered when he found out that she had not been taking her medication previously.  She was taking CBD for seizures.  Her mother and she decided that she should try to stop  the medication.  She is now taking the medication.  He continues to have difficulty setting romantic boundaries with his ex wife.  The relationship is comfortable, but he does not want anything further.  He has interested in another woman that lives in [place].  She has a six year old son.  He is not sure he wants to take on raising a young child.  He also struggles with her only working a part time job.  She said that she may call him to have dinner with her when she is in the area.          Treatment plan:  ? Target symptoms: depression, anxiety , grief  ? Why chosen therapy is appropriate versus another modality: relevant to diagnosis  ? Outcome monitoring methods: self-report, observation  ? Therapeutic intervention type: insight oriented psychotherapy, supportive psychotherapy, interactive psychotherapy     Risk parameters:  Patient reports no suicidal ideation  Patient reports no homicidal ideation  Patient reports no self-injurious behavior  Patient reports no violent behavior     Verbal deficits: None     Patient's response to intervention:  The patient's response to intervention is accepting, motivated.     Progress toward goals and other mental status changes:  The patient's progress toward goals is fair .     Diagnosis:   Bipolar disorder depressed      Plan:  individual psychotherapy, medication management by physician and AA     Return to clinic: as scheduled     Length of Service (minutes):  45

## 2022-03-28 ENCOUNTER — OFFICE VISIT (OUTPATIENT)
Dept: PSYCHIATRY | Facility: CLINIC | Age: 52
End: 2022-03-28
Payer: COMMERCIAL

## 2022-03-28 ENCOUNTER — PATIENT MESSAGE (OUTPATIENT)
Dept: PSYCHIATRY | Facility: CLINIC | Age: 52
End: 2022-03-28

## 2022-03-28 DIAGNOSIS — Z63.9 FAMILY DYNAMICS PROBLEM: ICD-10-CM

## 2022-03-28 DIAGNOSIS — F40.10 SOCIAL ANXIETY DISORDER: ICD-10-CM

## 2022-03-28 DIAGNOSIS — F17.220 NICOTINE DEPENDENCE, CHEWING TOBACCO, UNCOMPLICATED: ICD-10-CM

## 2022-03-28 DIAGNOSIS — F90.2 ADHD (ATTENTION DEFICIT HYPERACTIVITY DISORDER), COMBINED TYPE: Primary | ICD-10-CM

## 2022-03-28 DIAGNOSIS — F90.2 ADHD (ATTENTION DEFICIT HYPERACTIVITY DISORDER), COMBINED TYPE: ICD-10-CM

## 2022-03-28 DIAGNOSIS — F31.9 BIPOLAR 1 DISORDER, DEPRESSED: Primary | ICD-10-CM

## 2022-03-28 DIAGNOSIS — F14.10 COCAINE ABUSE: ICD-10-CM

## 2022-03-28 DIAGNOSIS — F10.11 ALCOHOL ABUSE, IN REMISSION: ICD-10-CM

## 2022-03-28 DIAGNOSIS — G47.00 INSOMNIA, UNSPECIFIED TYPE: ICD-10-CM

## 2022-03-28 PROCEDURE — 99214 OFFICE O/P EST MOD 30 MIN: CPT | Mod: 95,,, | Performed by: PSYCHIATRY & NEUROLOGY

## 2022-03-28 PROCEDURE — 99214 PR OFFICE/OUTPT VISIT, EST, LEVL IV, 30-39 MIN: ICD-10-PCS | Mod: 95,,, | Performed by: PSYCHIATRY & NEUROLOGY

## 2022-03-28 RX ORDER — DEXTROAMPHETAMINE SACCHARATE, AMPHETAMINE ASPARTATE MONOHYDRATE, DEXTROAMPHETAMINE SULFATE AND AMPHETAMINE SULFATE 7.5; 7.5; 7.5; 7.5 MG/1; MG/1; MG/1; MG/1
60 CAPSULE, EXTENDED RELEASE ORAL DAILY
Qty: 60 CAPSULE | Refills: 0 | Status: SHIPPED | OUTPATIENT
Start: 2022-04-29 | End: 2022-05-29

## 2022-03-28 RX ORDER — DEXTROAMPHETAMINE SACCHARATE, AMPHETAMINE ASPARTATE MONOHYDRATE, DEXTROAMPHETAMINE SULFATE AND AMPHETAMINE SULFATE 7.5; 7.5; 7.5; 7.5 MG/1; MG/1; MG/1; MG/1
60 CAPSULE, EXTENDED RELEASE ORAL DAILY
Qty: 60 CAPSULE | Refills: 0 | Status: SHIPPED | OUTPATIENT
Start: 2022-03-30 | End: 2022-03-28

## 2022-03-28 RX ORDER — ZOLPIDEM TARTRATE 10 MG/1
10 TABLET ORAL NIGHTLY PRN
Qty: 30 TABLET | Refills: 2 | Status: SHIPPED | OUTPATIENT
Start: 2022-03-28 | End: 2022-06-13 | Stop reason: SDUPTHER

## 2022-03-28 RX ORDER — DEXTROAMPHETAMINE SACCHARATE, AMPHETAMINE ASPARTATE MONOHYDRATE, DEXTROAMPHETAMINE SULFATE AND AMPHETAMINE SULFATE 7.5; 7.5; 7.5; 7.5 MG/1; MG/1; MG/1; MG/1
60 CAPSULE, EXTENDED RELEASE ORAL EVERY MORNING
Qty: 60 CAPSULE | Refills: 0 | Status: SHIPPED | OUTPATIENT
Start: 2022-03-28 | End: 2022-04-27

## 2022-03-28 RX ORDER — DEXTROAMPHETAMINE SACCHARATE, AMPHETAMINE ASPARTATE MONOHYDRATE, DEXTROAMPHETAMINE SULFATE AND AMPHETAMINE SULFATE 7.5; 7.5; 7.5; 7.5 MG/1; MG/1; MG/1; MG/1
60 CAPSULE, EXTENDED RELEASE ORAL DAILY
Qty: 60 CAPSULE | Refills: 0 | Status: SHIPPED | OUTPATIENT
Start: 2022-05-28 | End: 2022-06-13 | Stop reason: SDUPTHER

## 2022-03-28 RX ORDER — LAMOTRIGINE 200 MG/1
200 TABLET ORAL DAILY
Qty: 30 TABLET | Refills: 2 | Status: SHIPPED | OUTPATIENT
Start: 2022-03-28 | End: 2022-06-13 | Stop reason: SDUPTHER

## 2022-03-28 SDOH — SOCIAL DETERMINANTS OF HEALTH (SDOH): PROBLEM RELATED TO PRIMARY SUPPORT GROUP, UNSPECIFIED: Z63.9

## 2022-03-29 NOTE — PROGRESS NOTES
I cancelled an earlier Rx for Adderall XR 30 mg (2) capsules in morning that was sent out for 3-30-22; tho   I did resubmit for today 3-28-22  For same Adderall XR 30 mg (2) capsules in morning    Notation made:Established pt[place]; asks for 2 days early; ok by me as subsequent (April) Rx takes such into account; if any issue let MD know Thank you    D Post MD    PS: There are 2 other Rx for same regimen for April and May 2022 / already in place

## 2022-04-27 ENCOUNTER — PATIENT MESSAGE (OUTPATIENT)
Dept: ADMINISTRATIVE | Facility: HOSPITAL | Age: 52
End: 2022-04-27
Payer: COMMERCIAL

## 2022-04-29 RX ORDER — AMLODIPINE BESYLATE 5 MG/1
TABLET ORAL
Qty: 90 TABLET | Refills: 0 | Status: SHIPPED | OUTPATIENT
Start: 2022-04-29 | End: 2022-07-27

## 2022-04-29 RX ORDER — LISINOPRIL 10 MG/1
TABLET ORAL
Qty: 90 TABLET | Refills: 0 | Status: SHIPPED | OUTPATIENT
Start: 2022-04-29 | End: 2022-07-27

## 2022-04-29 NOTE — TELEPHONE ENCOUNTER
Care Due:                  Date            Visit Type   Department     Provider  --------------------------------------------------------------------------------                                EP -                              PRIMARY      [facility]  Last Visit: 04-      CARE ([facility])   MEDICINE       Oscar Rodríguez  Next Visit: None Scheduled  None         None Found                                                            Last  Test          Frequency    Reason                     Performed    Due Date  --------------------------------------------------------------------------------    Office Visit  12 months..  lisinopriL...............  04- 04-    CMP.........  12 months..  lisinopriL...............  06- 06-    Powered by [employer]. Reference number: [id number].   4/29/2022 8:29:05 AM ROSITAT

## 2022-04-29 NOTE — TELEPHONE ENCOUNTER
Refill Authorization Note   Andrei Casanova  is requesting a refill authorization.  Brief Assessment and Rationale for Refill:  Approve    -Medication-Related Problems Identified:   Requires labs  Requires appointment  Medication Therapy Plan:       Medication Reconciliation Completed: No   Comments:     Provider Staff:     Action is required for this patient.   Please see care gap opportunities below in Care Due Message.     Thanks!  [facility]     Appointments      Date Provider   Last Visit   4/19/2021 Oscar Rodríguez MD   Next Visit   Visit date not found Oscar Rodríguez MD     Note composed:11:48 AM 04/29/2022           Note composed:11:48 AM 04/29/2022

## 2022-04-29 NOTE — TELEPHONE ENCOUNTER
Provider Staff:     Action is required for this patient.   Please see care gap opportunities below in Care Due Message.     Thanks!  [facility]     Appointments      Date Provider   Last Visit   4/19/2021 Oscar Rodríguez MD   Next Visit   Visit date not found Oscar Rodríguez MD     Note composed:11:48 AM 04/29/2022

## 2022-06-13 ENCOUNTER — OFFICE VISIT (OUTPATIENT)
Dept: PSYCHIATRY | Facility: CLINIC | Age: 52
End: 2022-06-13
Payer: COMMERCIAL

## 2022-06-13 ENCOUNTER — PATIENT MESSAGE (OUTPATIENT)
Dept: PSYCHIATRY | Facility: CLINIC | Age: 52
End: 2022-06-13
Payer: COMMERCIAL

## 2022-06-13 VITALS
HEART RATE: 73 BPM | DIASTOLIC BLOOD PRESSURE: 89 MMHG | SYSTOLIC BLOOD PRESSURE: 138 MMHG | BODY MASS INDEX: 29.54 KG/M2 | WEIGHT: 205.94 LBS

## 2022-06-13 DIAGNOSIS — F31.9 BIPOLAR 1 DISORDER, DEPRESSED: Primary | ICD-10-CM

## 2022-06-13 DIAGNOSIS — F40.10 SOCIAL ANXIETY DISORDER: ICD-10-CM

## 2022-06-13 DIAGNOSIS — F14.10 COCAINE ABUSE: ICD-10-CM

## 2022-06-13 DIAGNOSIS — F10.11 ALCOHOL ABUSE, IN REMISSION: ICD-10-CM

## 2022-06-13 DIAGNOSIS — F43.21 GRIEF: ICD-10-CM

## 2022-06-13 DIAGNOSIS — G47.00 INSOMNIA, UNSPECIFIED TYPE: ICD-10-CM

## 2022-06-13 DIAGNOSIS — F90.2 ADHD (ATTENTION DEFICIT HYPERACTIVITY DISORDER), COMBINED TYPE: ICD-10-CM

## 2022-06-13 DIAGNOSIS — F90.2 ADHD (ATTENTION DEFICIT HYPERACTIVITY DISORDER), COMBINED TYPE: Primary | ICD-10-CM

## 2022-06-13 DIAGNOSIS — F17.220 NICOTINE DEPENDENCE, CHEWING TOBACCO, UNCOMPLICATED: ICD-10-CM

## 2022-06-13 DIAGNOSIS — N41.9 PROSTATITIS, UNSPECIFIED PROSTATITIS TYPE: ICD-10-CM

## 2022-06-13 DIAGNOSIS — Z63.9 FAMILY DYNAMICS PROBLEM: ICD-10-CM

## 2022-06-13 DIAGNOSIS — F31.9 BIPOLAR 1 DISORDER, DEPRESSED: ICD-10-CM

## 2022-06-13 PROCEDURE — 99499 UNLISTED E&M SERVICE: CPT | Mod: S$GLB,,, | Performed by: PSYCHIATRY & NEUROLOGY

## 2022-06-13 PROCEDURE — 99214 PR OFFICE/OUTPT VISIT, EST, LEVL IV, 30-39 MIN: ICD-10-PCS | Mod: 95,,, | Performed by: PSYCHIATRY & NEUROLOGY

## 2022-06-13 PROCEDURE — 99499 NO LOS: ICD-10-PCS | Mod: S$GLB,,, | Performed by: PSYCHIATRY & NEUROLOGY

## 2022-06-13 PROCEDURE — 99214 OFFICE O/P EST MOD 30 MIN: CPT | Mod: 95,,, | Performed by: PSYCHIATRY & NEUROLOGY

## 2022-06-13 RX ORDER — LAMOTRIGINE 200 MG/1
200 TABLET ORAL DAILY
Qty: 30 TABLET | Refills: 2 | Status: SHIPPED | OUTPATIENT
Start: 2022-06-13 | End: 2022-08-29 | Stop reason: SDUPTHER

## 2022-06-13 RX ORDER — DEXTROAMPHETAMINE SACCHARATE, AMPHETAMINE ASPARTATE MONOHYDRATE, DEXTROAMPHETAMINE SULFATE AND AMPHETAMINE SULFATE 7.5; 7.5; 7.5; 7.5 MG/1; MG/1; MG/1; MG/1
60 CAPSULE, EXTENDED RELEASE ORAL DAILY
Qty: 60 CAPSULE | Refills: 0 | Status: SHIPPED | OUTPATIENT
Start: 2022-08-26 | End: 2022-08-29

## 2022-06-13 RX ORDER — DEXTROAMPHETAMINE SACCHARATE, AMPHETAMINE ASPARTATE MONOHYDRATE, DEXTROAMPHETAMINE SULFATE AND AMPHETAMINE SULFATE 7.5; 7.5; 7.5; 7.5 MG/1; MG/1; MG/1; MG/1
60 CAPSULE, EXTENDED RELEASE ORAL DAILY
Qty: 60 CAPSULE | Refills: 0 | Status: SHIPPED | OUTPATIENT
Start: 2022-07-27 | End: 2022-07-27 | Stop reason: SDUPTHER

## 2022-06-13 RX ORDER — DEXTROAMPHETAMINE SACCHARATE, AMPHETAMINE ASPARTATE MONOHYDRATE, DEXTROAMPHETAMINE SULFATE AND AMPHETAMINE SULFATE 7.5; 7.5; 7.5; 7.5 MG/1; MG/1; MG/1; MG/1
60 CAPSULE, EXTENDED RELEASE ORAL DAILY
Qty: 60 CAPSULE | Refills: 0 | Status: SHIPPED | OUTPATIENT
Start: 2022-06-27 | End: 2022-07-27

## 2022-06-13 RX ORDER — ZOLPIDEM TARTRATE 10 MG/1
10 TABLET ORAL NIGHTLY PRN
Qty: 30 TABLET | Refills: 2 | Status: SHIPPED | OUTPATIENT
Start: 2022-06-27 | End: 2022-08-29 | Stop reason: SDUPTHER

## 2022-06-13 SDOH — SOCIAL DETERMINANTS OF HEALTH (SDOH): PROBLEM RELATED TO PRIMARY SUPPORT GROUP, UNSPECIFIED: Z63.9

## 2022-06-13 NOTE — PROGRESS NOTES
Andrei Severinoett   1970 06/13/2022     Disclaimer: Evaluation and treatment is based on information presented to date. Any new information may affect assessment and findings.    The patient location is: Patient's home   and reported  that his/her location at the time of this visit was in the [facility]     Visit type: Virtual visit with synchronous audio and video     Each patient to whom he or she provides medical services by telemedicine is: (1) informed of the relationship between the physician and patient and the respective role of any other health care provider with respect to management of the patient; and (2) notified that he or she may decline to receive medical services by telemedicine and may withdraw from such care at any time.    Patient was informed that I am a physician who is licensed in the [facility]:  Kyree Bhatt MD:  Employed by   [facility]     If technology issues arise: YUAN Bhatt MD will attempt to call pt back tho pt also instructed that he/she may  call our office phone at: [phone]    Pt informed that if he / she is ever in crisis (or has acute concerns): pt is instructed to Dial 911 or go to nearest Emergency Room (ER)    Pt informed that if questions related to privacy practices: pt is instructed to contact [facility] Information Department: [phone]    Understanding Expressed. No questions.    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    Work going well at [employer]    Remains Adderall XR 30 mg (TWO)  Daily ; remains on Lamictal ; no rash;  likes it     Medication: no longer on neuroleptic ; off  For some time; he  Tried Abilify  10 lb (wt gain)    then D/C Saphris (muscle stiffness)  Zyprexa (sluggish and blah)    Continues with Lamictal (200mg) and Adderall XR 30  Mg (2 a day);  And Ambien 10 mg / likes his medication regimen    Reports remains 'clean / sober' says  speaks to sponsor everyday     Says work going fine;  Says looking at retiring around   to ; reports that he has received 3 raises in 12 months which favorably affects his care home as well    X mas : 1st  Holiday after mother passing ;  dad remains living in [place]; getting along ok    Working with  Social Work (Chris Alberto LCSW) : Includes in part Family of Origin issues and  BROTHER passed 2020 ALSO of Covid. MOM PASSED AWAY of [employer]  [place] for his mother's  even during covid ; says he will get back in with Chris Alberto LCSW and knows how to schedule himself     Encouraged him to consider some intermittent 'check in' with Chris Florez LCSW even IF quarterly of 2x yr tho he did not express need and says aware he can contact if desired    Back volunteering at [employer] (Andrei Briones counselor) ; he liked such ; goes to [facility]; seems to take pride / value in participating there; "helping others"    Depression Patient Health Questionnaire 2022 3/28/2022 1/3/2022 10/18/2021 2021 2021 3/26/2018   Over the last two weeks how often have you been bothered by little interest or pleasure in doing things 0 0 0 0 1 3 0   Over the last two weeks how often have you been bothered by feeling down, depressed or hopeless 0 1 1 0 0 1 0   PHQ-2 Total Score 0 1 1 0 1 4 0   Over the last two weeks how often have you been bothered by trouble falling or " staying asleep, or sleeping too much 0 0 0 0 0 2 -   Over the last two weeks how often have you been bothered by feeling tired or having little energy 1 2 1 0 1 3 -   Over the last two weeks how often have you been bothered by a poor appetite or overeating 0 0 0 0 0 3 -   Over the last two weeks how often have you been bothered by feeling bad about yourself - or that you are a failure or have let yourself or your family down 0 0 0 1 1 2 -   Over the last two weeks how often have you been bothered by trouble concentrating on things, such as reading the newspaper or watching television 0 1 1 0 0 2 -   Over the last two weeks how often have you been bothered by moving or speaking so slowly that other people could have noticed. Or the opposite - being so fidgety or restless that you have been moving around a lot more than usual. 1 1 1 1 0 0 -   Over the last two weeks how often have you been bothered by thoughts that you would be better off dead, or of hurting yourself 0 0 0 0 0 0 -   If you checked off any problems, how difficult have these problems made it for you to do your work, take care of things at home or get along with other people? Not difficult at all Not difficult at all Not difficult at all Somewhat difficult Somewhat difficult Somewhat difficult -   Total Score 2 5 4 2 3 16 -   Interpretation Minimal or None Mild Minimal or None Minimal or None Minimal or None Moderately Severe -     GAD7 6/13/2022 3/27/2022 1/3/2022   1. Feeling nervous, anxious, or on edge? 1 1 1   2. Not being able to stop or control worrying? 0 0 1   3. Worrying too much about different things? 0 0 1   4. Trouble relaxing? 0 1 0   5. Being so restless that it is hard to sit still? 0 0 0   6. Becoming easily annoyed or irritable? 0 0 1   7. Feeling afraid as if something awful might happen? 0 0 0   8. If you checked off any problems, how difficult have these problems made it for you to do your work, take care of things at home, or get  "along with other people? - - -   MATTHEW-7 Score 1 2 4     Safety:no SI / no HI     Supports: enjoys his daughter ; she graduated in  journalism recently; (he is concerned about her seizure history)     Work / School     Activities tends like more solitarty activities / notes: social     Staying "Sober / "clean"  (I.e., Substance issue): says has sponsor / and participates some in AA    Constitutional Health Concerns:       Review of Systems   HENT: Negative.    Eyes: Negative.    Respiratory: Negative.    Cardiovascular: Negative.    Gastrointestinal: Negative.    Genitourinary:        Prostatitis 2022; seeing urology    Musculoskeletal: Negative.    Skin: Negative.    Neurological: Negative.    Endo/Heme/Allergies: Negative.       Musculoskeletal: no tremor no stiffness       Patient Active Problem List   Diagnosis    ADD (attention deficit disorder)    Bipolar affective    HTN (hypertension)    Alcohol abuse, in remission    Chews tobacco regularly    Mixed hyperlipidemia    Colon cancer screening    Special screening for malignant neoplasms, colon    Family history of colonic polyps    Bipolar 1 disorder, depressed    Social anxiety disorder    ADHD (attention deficit hyperactivity disorder), combined type    Cocaine abuse; full remission x years    Nicotine dependence, chewing tobacco, uncomplicated    Insomnia    Grief: Mom  Covid Dec 2020    Erythrocytosis    Family dynamic issues : dad's health, 16 yr old daughter living with ex wife / has seizures, other (see SW notes)    Prostatitis          Current Outpatient Medications:     amLODIPine (NORVASC) 5 MG tablet, TAKE 1 TABLET BY MOUTH DAILY, Disp: 90 tablet, Rfl: 0    [START ON 2022] dextroamphetamine-amphetamine (ADDERALL XR) 30 MG 24 hr capsule, Take 2 capsules (60 mg total) by mouth once daily., Disp: 60 capsule, Rfl: 0    [START ON 2022] dextroamphetamine-amphetamine (ADDERALL XR) 30 MG 24 hr capsule, Take 2 capsules " (60 mg total) by mouth once daily., Disp: 60 capsule, Rfl: 0    [START ON 2022] dextroamphetamine-amphetamine (ADDERALL XR) 30 MG 24 hr capsule, Take 2 capsules (60 mg total) by mouth once daily., Disp: 60 capsule, Rfl: 0    lamoTRIgine (LAMICTAL) 200 MG tablet, Take 1 tablet (200 mg total) by mouth once daily. STOP all Lamictal IF any RASH and tell Psyc MD, Disp: 30 tablet, Rfl: 2    lisinopriL 10 MG tablet, TAKE 1 TABLET BY MOUTH DAILY, Disp: 90 tablet, Rfl: 0    [START ON 2022] zolpidem (AMBIEN) 10 mg Tab, Take 1 tablet (10 mg total) by mouth nightly as needed (INSOMNIA)., Disp: 30 tablet, Rfl: 2     Social History     Tobacco Use   Smoking Status Former Smoker    Packs/day: 1.00    Years: 15.00    Pack years: 15.00    Quit date: 2009    Years since quittin.3   Smokeless Tobacco Never Used        Review of patient's allergies indicates:   Allergen Reactions    Abilify [aripiprazole] Other (See Comments)     Weight gain 10 lbs    Remeron [mirtazapine] Other (See Comments)     Increased hunger and felt disoriented    Saphris [asenapine] Other (See Comments)     Muscle stiffness    Zoloft [sertraline] Other (See Comments)     Sex dysfunction (anorgasmia)    Zyprexa [olanzapine] Other (See Comments)     sluggish and emotionally flat      Impulse Control: no history SI / nor HI    Mental Status Exam:   Appearance: casual   Oriented: x 3  Attitude: cooperative   Eye Contact: good   Behavior: calm / calm appearing / smiling at times   Mood: doing ok  Cognition: alert   Concentration: grossly intact   Affect: appropriate range   Anxiety: mild  Thought Process: goal directed   Speech: Volume : WNL   Quantity WNL   Quality: appears to openly answer questions   Eye Contact: good   Threats: no SI / no HI   Psychosis: denies all   Estimate of Intellectual Function: above average     ASSESSMENT:   Encounter Diagnoses   Name Primary?    Bipolar 1 disorder, depressed Yes    Social anxiety  disorder     ADHD (attention deficit hyperactivity disorder), combined type     Alcohol abuse, in FULL REMISSION sober 1 year 9-; in past 1/5 th a day      Prostatitis, unspecified (reported such at June 2022 session ; seeing urology)     Insomnia, unspecified type     Nicotine dependence, chewing tobacco, uncomplicated     Family dynamic issues : dad's health, 16 yr old daughter living with ex wife / has seizures, other (see  notes)     Cocaine abuse; full REMISSION x years        Patient Instructions       PLAN:     Follow UP      8/29/2022  9:30 AM ESTABLISHED PATIENT - [facility] Kyree Bhatt MD       Also follow up Chris ELLISW as arranged    Discussed plan for subsequent 2022 appt to be in Clinic     Meds: see aftervisit summary / meds reviewed as prior     References: (reviewed with pt as well):    Anxiety &  phobia workbook by JULIANO Espitia PhD  (web retailers: used: $ 7-10)    Relaxation stress reduction workbook: ALFA Lal PhD ( used: $7-10)    Feeling Good Website: Kyree Sweeney MD / www.[employer] website (free) PODCASTS    [place]: Path to Better Sleep : https://www.veterantraining.va.gov/insomnia/ (free)     Pt expressed appreciation for the visit today and did not have further question at this time though pt  was still informed to:     Call  if problems.    Call / Report Side Effects to Psyc MD     Encouraged to follow up with primary care / Gen Med MD for continued monitoring of general health and wellness.    Understanding was expressed; and no further concerns nor questions were raised at this time.     remember healthy self care:   eat right  attempt adequate rest   HANDWASHING / encourage such paris. During this corona virus time   walk or light exercise within reason and as your general med team approves  read or explore any of reference materials / homework mentioned  reach out (I.e.,  connect with)  others who nuture and bring out best  "in you  avoid risky behaviors  keep your appointments  IF you  cannot make your appt THEN please call or go online to reschedule.  avoid  alcohol and illicit substances.  Look for the positive.  All is often relative-seek balance  Call sooner if needed : [phone]   Call 911 or go to Emergency Room  (ER)  if any acute concerns        >>BACKGROUND from admit victoriano Romero Sept 2020 <<     Andrei S Casanova vale 52 y.o. Bipolar (depressed)  male presents today as referred by [facility] MD    Was seeing Carlos Barber MD for 8-10 yrs;he retired. 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 Dr Javed has continued on meds as from Dr Segovia    Pt has been maintained on:  on lamictal 200 mg (no rash)   adderall XR 30   ambien  5mg    Sober x 1 yr as of 9- via AA; prior to that 9 and 1/2 yr after relapsed    Says has sponsor    Says 'to be up front have had substance issues: alcohol and cocaine     Social anxiety ' big " "struggle for long time' ; more recently though time leave house; always struggle; people who not know think I outgoing; tho says push through it; makes feel present someone I am not    prior Juliette SCHNEIDER: tried paxil zoloft tho no clear recall of how did: interest to try /    Never abilify jack    Says he did Tried effexor XR (75 mg) difficult sex side effect;  Denies any suicidal ideation / nor gesture / nor any thoughts to harm other     No psychosis    Self Rating Scales: (Such submitted for scanning) :     Quick Inventory of Depression (QID-Short Form):21  Zung Anxiety Index:78   Mood Disorder Questionnaire (MDQ): 8  The [employer] Framework: a "bio-psycho-social" screening form for use as clinical raw data. / (submitted for scanning)      World Health Organization (W.H.O.) Adult ADHD Self Report Scale (ASRS-henrique 1.1):  5 of 6 core and 6 of 12 adjunctive    PAST PSYCHIATRIC HISTORY:     Inpatient: n  Outpatient: (incl. Primary Care): juliette Barber MD      Past Surgical History:   Procedure Laterality Date    APPENDECTOMY      COLONOSCOPY N/A 2/25/2019    Procedure: COLONOSCOPY;  Surgeon: Denis Eller MD;  [facility];  Service: Endoscopy;  Laterality: N/A;    KNEE SURGERY      right    SHOULDER SURGERY      bilateral    SINUS SURGERY      TONSILLECTOMY      VASECTOMY        Other (medical) :    Head Injury: n  Seizure: cocaine use / in 20s; late 30's (couple mre)  Diabetes :n  HTN Yes   Other:     Substance Use:    Once start [employer] avoid cannabis    When revert would isolate ; did for release ; drug choice was cocaine    Alcohol: last 9-8 2020;   Start: 10y  regular use: 12y UNTIL decaon (1st period sober) when left start back drinking  At 12y: every weekend: beer 6 12 oa  16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 / 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; 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 (2-6 beer)    Blackouts: deneis   No seizure:    Relationships: when drink / isolate / fear of work " "consequence IF they learned     Was drinking 5th a day (3 yr ago; "drank hard = 5th a day"); until passed out; had get up 4 a or 4;30a ; in fear breathalizer; combat by get grits rodríguez     After deacon daily and coacine    Went rehab: [facility] 1 month 2008 ; 2 months prior: in [place]: 21 days (out pocket private); none other    When went [facility], self report drugs and alcohol; they said do not worry; when got back ; was sent psychiatrist pradip said lied on questionairre as he said not drinknig so terminated    2 and 1/2 yrs later: he contact [facility] ; says was wrongfully terminated as self report; go job back    Says for 1st 2 y back weekly drug test and random; and employer said go see psyc MD and go to counselor ; NOW just random.    Cannabis: 5th grade 1st  Try; 8th grade : at least 1x week; daily use: 16y until 18y then when came across    Tobacco:former ; stopped 2008 / start 21 yrs olf  Cocaine:years ago   Benzo: n  Opioid: n  Ecstasy:n  Other:n     Grandparent: Drug problems brother drug problem   Parents uncles / depression   Daughter depression    Mother and aunt history significant anxiety     3 wishes? :  Serenity, comfortable in skin , (20y) daughter not have seizures or migranes     PSYCHO-SOCIAL DEVELOPMENT HISTORY:     [facility] Born: [facility]    Siblings (full or half)  Brothers: 1 older 2 1/2   Sisters: none    Parents : alive     Briefly Describe  your Mom: poor health controlling   Briefly Describe your Dad: poor health unhappy anxious    Bio Mom: Occupation:    Bio Dad:  Occupation: car sales     Marital Status:  / x2 to same lady (15 y / 1995); 2 1/2 y [place] ([place], [place]) ; speak frequently ; she RN school     Children   Girls  (ages): 1 daughter 20 y: seizures migraines vertigo / depression anxiety / [employer] / 2nd yr journalism  Boys (ages):     Physical Abuse: N    Sexual abuse  N    Other trauma / abuse? 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 n

## 2022-06-14 NOTE — PATIENT INSTRUCTIONS
PLAN:     Follow UP      8/29/2022  9:30 AM ESTABLISHED PATIENT - [facility] Kyree Bhatt MD       Also follow up Chris Alberto LCSW as arranged    Discussed plan for subsequent 2022 appt to be in Clinic     Meds: see aftervisit summary / meds reviewed as prior     References: (reviewed with pt as well):    Anxiety &  phobia workbook by JULIANO Espitia PhD  (web retailers: used: $ 7-10)    Relaxation stress reduction workbook: ALFA Lal PhD ( used: $7-10)    Feeling Good Website: Kyree Sweeney MD / [email or web address] website (free) PODCASTS    [place]: Path to Better Sleep : https://www.veterantraining.va.gov/insomnia/ (free)     Pt expressed appreciation for the visit today and did not have further question at this time though pt  was still informed to:     Call  if problems.    Call / Report Side Effects to Psyc MD     Encouraged to follow up with primary care / Gen Med MD for continued monitoring of general health and wellness.    Understanding was expressed; and no further concerns nor questions were raised at this time.     remember healthy self care:   eat right  attempt adequate rest   HANDWASHING / encourage such paris. During this corona virus time   walk or light exercise within reason and as your general med team approves  read or explore any of reference materials / homework mentioned  reach out (I.e.,  connect with)  others who nuture and bring out best in you  avoid risky behaviors  keep your appointments  IF you  cannot make your appt THEN please call or go online to reschedule.  avoid  alcohol and illicit substances.  Look for the positive.  All is often relative-seek balance  Call sooner if needed : [phone]   Call 911 or go to Emergency Room  (ER)  if any acute concerns

## 2022-07-01 NOTE — PROGRESS NOTES
Pt was present to provider was dealing with important clinical matter / delayed   cancelled / rescheduled / and was seen at end of day 6-13-22 / see that encounter   Noel SCHNEIDER

## 2022-07-27 ENCOUNTER — PATIENT MESSAGE (OUTPATIENT)
Dept: ADMINISTRATIVE | Facility: HOSPITAL | Age: 52
End: 2022-07-27
Payer: COMMERCIAL

## 2022-07-27 ENCOUNTER — PATIENT MESSAGE (OUTPATIENT)
Dept: PSYCHIATRY | Facility: CLINIC | Age: 52
End: 2022-07-27
Payer: COMMERCIAL

## 2022-07-27 DIAGNOSIS — F90.2 ADHD (ATTENTION DEFICIT HYPERACTIVITY DISORDER), COMBINED TYPE: ICD-10-CM

## 2022-07-27 RX ORDER — DEXTROAMPHETAMINE SACCHARATE, AMPHETAMINE ASPARTATE MONOHYDRATE, DEXTROAMPHETAMINE SULFATE AND AMPHETAMINE SULFATE 7.5; 7.5; 7.5; 7.5 MG/1; MG/1; MG/1; MG/1
60 CAPSULE, EXTENDED RELEASE ORAL DAILY
Qty: 60 CAPSULE | Refills: 0 | Status: SHIPPED | OUTPATIENT
Start: 2022-07-27 | End: 2022-08-29

## 2022-07-28 DIAGNOSIS — F90.2 ADHD (ATTENTION DEFICIT HYPERACTIVITY DISORDER), COMBINED TYPE: ICD-10-CM

## 2022-07-29 ENCOUNTER — TELEPHONE (OUTPATIENT)
Dept: PHARMACY | Facility: CLINIC | Age: 52
End: 2022-07-29
Payer: COMMERCIAL

## 2022-07-30 ENCOUNTER — PATIENT MESSAGE (OUTPATIENT)
Dept: PSYCHIATRY | Facility: CLINIC | Age: 52
End: 2022-07-30
Payer: COMMERCIAL

## 2022-07-30 RX ORDER — DEXTROAMPHETAMINE SACCHARATE, AMPHETAMINE ASPARTATE MONOHYDRATE, DEXTROAMPHETAMINE SULFATE AND AMPHETAMINE SULFATE 7.5; 7.5; 7.5; 7.5 MG/1; MG/1; MG/1; MG/1
60 CAPSULE, EXTENDED RELEASE ORAL DAILY
Qty: 60 CAPSULE | Refills: 0 | OUTPATIENT
Start: 2022-07-30 | End: 2022-08-29

## 2022-08-24 DIAGNOSIS — I10 HTN (HYPERTENSION): ICD-10-CM

## 2022-08-29 ENCOUNTER — OFFICE VISIT (OUTPATIENT)
Dept: PSYCHIATRY | Facility: CLINIC | Age: 52
End: 2022-08-29
Payer: COMMERCIAL

## 2022-08-29 DIAGNOSIS — G47.00 INSOMNIA, UNSPECIFIED TYPE: ICD-10-CM

## 2022-08-29 DIAGNOSIS — F90.2 ADHD (ATTENTION DEFICIT HYPERACTIVITY DISORDER), COMBINED TYPE: ICD-10-CM

## 2022-08-29 DIAGNOSIS — F31.9 BIPOLAR 1 DISORDER, DEPRESSED: Primary | ICD-10-CM

## 2022-08-29 DIAGNOSIS — F17.220 NICOTINE DEPENDENCE, CHEWING TOBACCO, UNCOMPLICATED: ICD-10-CM

## 2022-08-29 DIAGNOSIS — F14.10 COCAINE ABUSE: ICD-10-CM

## 2022-08-29 DIAGNOSIS — F31.62 MODERATE MIXED BIPOLAR I DISORDER: Primary | ICD-10-CM

## 2022-08-29 DIAGNOSIS — F10.11 ALCOHOL ABUSE, IN REMISSION: ICD-10-CM

## 2022-08-29 DIAGNOSIS — F40.10 SOCIAL ANXIETY DISORDER: ICD-10-CM

## 2022-08-29 DIAGNOSIS — Z63.9 FAMILY DYNAMICS PROBLEM: ICD-10-CM

## 2022-08-29 PROCEDURE — 90834 PSYTX W PT 45 MINUTES: CPT | Mod: S$GLB,,, | Performed by: SOCIAL WORKER

## 2022-08-29 PROCEDURE — 90834 PR PSYCHOTHERAPY W/PATIENT, 45 MIN: ICD-10-PCS | Mod: S$GLB,,, | Performed by: SOCIAL WORKER

## 2022-08-29 PROCEDURE — 99999 PR PBB SHADOW E&M-EST. PATIENT-LVL I: CPT | Mod: PBBFAC,,, | Performed by: SOCIAL WORKER

## 2022-08-29 PROCEDURE — 99214 OFFICE O/P EST MOD 30 MIN: CPT | Mod: 95,,, | Performed by: PSYCHIATRY & NEUROLOGY

## 2022-08-29 PROCEDURE — 99999 PR PBB SHADOW E&M-EST. PATIENT-LVL I: ICD-10-PCS | Mod: PBBFAC,,, | Performed by: SOCIAL WORKER

## 2022-08-29 PROCEDURE — 99214 PR OFFICE/OUTPT VISIT, EST, LEVL IV, 30-39 MIN: ICD-10-PCS | Mod: 95,,, | Performed by: PSYCHIATRY & NEUROLOGY

## 2022-08-29 RX ORDER — DEXTROAMPHETAMINE SACCHARATE, AMPHETAMINE ASPARTATE MONOHYDRATE, DEXTROAMPHETAMINE SULFATE AND AMPHETAMINE SULFATE 7.5; 7.5; 7.5; 7.5 MG/1; MG/1; MG/1; MG/1
60 CAPSULE, EXTENDED RELEASE ORAL EVERY MORNING
Qty: 60 CAPSULE | Refills: 0 | Status: SHIPPED | OUTPATIENT
Start: 2022-09-28 | End: 2022-10-28

## 2022-08-29 RX ORDER — DEXTROAMPHETAMINE SACCHARATE, AMPHETAMINE ASPARTATE MONOHYDRATE, DEXTROAMPHETAMINE SULFATE AND AMPHETAMINE SULFATE 7.5; 7.5; 7.5; 7.5 MG/1; MG/1; MG/1; MG/1
60 CAPSULE, EXTENDED RELEASE ORAL EVERY MORNING
Qty: 60 CAPSULE | Refills: 0 | Status: SHIPPED | OUTPATIENT
Start: 2022-10-28 | End: 2022-11-07 | Stop reason: SDUPTHER

## 2022-08-29 RX ORDER — DEXTROAMPHETAMINE SACCHARATE, AMPHETAMINE ASPARTATE MONOHYDRATE, DEXTROAMPHETAMINE SULFATE AND AMPHETAMINE SULFATE 7.5; 7.5; 7.5; 7.5 MG/1; MG/1; MG/1; MG/1
60 CAPSULE, EXTENDED RELEASE ORAL EVERY MORNING
Qty: 60 CAPSULE | Refills: 0 | Status: SHIPPED | OUTPATIENT
Start: 2022-08-29 | End: 2022-09-28

## 2022-08-29 RX ORDER — LAMOTRIGINE 200 MG/1
200 TABLET ORAL DAILY
Qty: 30 TABLET | Refills: 2 | Status: SHIPPED | OUTPATIENT
Start: 2022-08-29 | End: 2022-11-07 | Stop reason: SDUPTHER

## 2022-08-29 RX ORDER — ZOLPIDEM TARTRATE 10 MG/1
10 TABLET ORAL NIGHTLY PRN
Qty: 30 TABLET | Refills: 2 | Status: SHIPPED | OUTPATIENT
Start: 2022-09-23 | End: 2022-11-07 | Stop reason: SDUPTHER

## 2022-08-29 SDOH — SOCIAL DETERMINANTS OF HEALTH (SDOH): PROBLEM RELATED TO PRIMARY SUPPORT GROUP, UNSPECIFIED: Z63.9

## 2022-08-29 NOTE — PROGRESS NOTES
Andrei Casanova   1970 08/29/2022     Disclaimer: Evaluation and treatment is based on information presented to date. Any new information may affect assessment and findings.    The patient location is: Patient's home   and reported  that his/her location at the time of this visit was in the [facility]     Visit type: Virtual visit with synchronous audio and video     Each patient to whom he or she provides medical services by telemedicine is: (1) informed of the relationship between the physician and patient and the respective role of any other health care provider with respect to management of the patient; and (2) notified that he or she may decline to receive medical services by telemedicine and may withdraw from such care at any time.    Patient was informed that I am a physician who is licensed in the [facility]:  Kyree Bhatt MD:  Employed by   [employer]     If technology issues arise: YUAN Bhatt MD will attempt to call pt back tho pt also instructed that he/she may  call our office phone at: [phone]    Pt informed that if he / she is ever in crisis (or has acute concerns): pt is instructed to Dial 911 or go to nearest Emergency Room (ER)    Pt informed that if questions related to privacy practices: pt is instructed to contact [facility] Information Department: [phone]    Understanding Expressed. No questions.    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    Work going well at [employer]  "plant    Remains Adderall XR 30 mg (TWO)  Daily ; remains on Lamictal ; no rash;  likes it     Medication: no longer on neuroleptic ; off  For some time; he  Tried Abilify  10 lb (wt gain)    then D/C Saphris (muscle stiffness)  Zyprexa (sluggish and blah)    Continues with Lamictal (200mg) and Adderall XR 30  Mg (2 a day);  And Ambien 10 mg / likes his medication regimen; has done well on such    Reports remains 'clean / sober' says  speaks to sponsor everyday     Says work going fine;  Says looking at retiring around   to ; reports that he has received 3 raises in 12 months which favorably affects his USP as well    X mas :   Holiday after mother passing ;  dad remains living in [place]; getting along ok    Has worked with  Social Work (Chris Alberto LCSW) : Includes in part Family of Origin issues and  BROTHER passed 2020 ALSO of Covid. MOM PASSED AWAY of [employer]  as [place] for his mother's  even during covid ; says he will get back in with Chris Alberto LCSW and knows how to schedule himself     Has an appt with Mr Remy STONE today 2022 / doing well continuity of care     Back volunteering at [employer] (Andrei Briones counselor) ; he liked such ; goes to [facility]; seems to take pride / value in participating there; "helping others"    Depression Patient Health Questionnaire 2022 2022 3/28/2022 1/3/2022 10/18/2021 2021 2021   Over the last two weeks how often have you been bothered by little interest or pleasure in doing things Not at all Not at all Not at all Not at all Not at all Several days Nearly every day   Over the last two weeks how often have you been bothered by feeling down, depressed or hopeless Not at all Not at all Several days Several days Not at all Not at all Several days   PHQ-2 Total Score 0 0 1 1 0 1 4   Over the last two weeks how often have you been bothered by trouble falling or staying asleep, or sleeping " too much Not at all Not at all Not at all Not at all Not at all Not at all More than half the days   Over the last two weeks how often have you been bothered by feeling tired or having little energy Not at all Several days More than half the days Several days Not at all Several days Nearly every day   Over the last two weeks how often have you been bothered by a poor appetite or overeating Not at all Not at all Not at all Not at all Not at all Not at all Nearly every day   Over the last two weeks how often have you been bothered by feeling bad about yourself - or that you are a failure or have let yourself or your family down Not at all Not at all Not at all Not at all Several days Several days More than half the days   Over the last two weeks how often have you been bothered by trouble concentrating on things, such as reading the newspaper or watching television Not at all Not at all Several days Several days Not at all Not at all More than half the days   Over the last two weeks how often have you been bothered by moving or speaking so slowly that other people could have noticed. Or the opposite - being so fidgety or restless that you have been moving around a lot more than usual. Not at all Several days Several days Several days Several days Not at all Not at all   Over the last two weeks how often have you been bothered by thoughts that you would be better off dead, or of hurting yourself Not at all Not at all Not at all Not at all Not at all Not at all Not at all   If you checked off any problems, how difficult have these problems made it for you to do your work, take care of things at home or get along with other people? Not difficult at all Not difficult at all Not difficult at all Not difficult at all Somewhat difficult Somewhat difficult Somewhat difficult   Total Score 0 2 5 4 2 3 16   Interpretation Minimal or None Minimal or None Mild Minimal or None Minimal or None Minimal or None Moderately Severe  "      GAD7 2022   1. Feeling nervous, anxious, or on edge? 1 1 1   2. Not being able to stop or control worrying? 0 0 0   3. Worrying too much about different things? 0 0 0   4. Trouble relaxing? 1 1 0   5. Being so restless that it is hard to sit still? 0 0 0   6. Becoming easily annoyed or irritable? 0 0 0   7. Feeling afraid as if something awful might happen? 0 0 0   8. If you checked off any problems, how difficult have these problems made it for you to do your work, take care of things at home, or get along with other people? - - -   MATTHEW-7 Score 2 2 1     Safety:no SI / no HI     Supports: enjoys his daughter ; she graduated in  [employer] recently; (he is concerned about her seizure history)     Work / School     Activities tends like more solitarty activities / notes:  some social avoidant    Staying "Sober / "clean"  (I.e., Substance issue): says has sponsor / and participates some in AA    Constitutional Health Concerns:       Review of Systems   HENT: Negative.     Eyes: Negative.    Respiratory: Negative.     Cardiovascular: Negative.    Gastrointestinal: Negative.    Genitourinary:         Prostatitis resolved    Musculoskeletal: Negative.    Skin: Negative.    Neurological: Negative.    Endo/Heme/Allergies: Negative.     Musculoskeletal: no tremor no stiffness       Patient Active Problem List   Diagnosis    ADD (attention deficit disorder)    Bipolar affective    HTN (hypertension)    Alcohol abuse, in remission    Chews tobacco regularly    Mixed hyperlipidemia    Colon cancer screening    Special screening for malignant neoplasms, colon    Family history of colonic polyps    Bipolar 1 disorder, depressed    Social anxiety disorder    ADHD (attention deficit hyperactivity disorder), combined type    Cocaine abuse; full remission x years    Nicotine dependence, chewing tobacco, uncomplicated    Insomnia    Grief: Mom  Covid Dec 2020    Erythrocytosis    Family dynamic " issues : dad's health, 16 yr old daughter living with ex wife / has seizures, other (see SW notes)    Prostatitis          Current Outpatient Medications:     amLODIPine (NORVASC) 5 MG tablet, TAKE 1 TABLET BY MOUTH DAILY, Disp: 10 tablet, Rfl: 0    dextroamphetamine-amphetamine (ADDERALL XR) 30 MG 24 hr capsule, Take 2 capsules (60 mg total) by mouth every morning., Disp: 60 capsule, Rfl: 0    [START ON 2022] dextroamphetamine-amphetamine (ADDERALL XR) 30 MG 24 hr capsule, Take 2 capsules (60 mg total) by mouth every morning., Disp: 60 capsule, Rfl: 0    [START ON 10/28/2022] dextroamphetamine-amphetamine (ADDERALL XR) 30 MG 24 hr capsule, Take 2 capsules (60 mg total) by mouth every morning., Disp: 60 capsule, Rfl: 0    lamoTRIgine (LAMICTAL) 200 MG tablet, Take 1 tablet (200 mg total) by mouth once daily. STOP all Lamictal IF any RASH and tell Psyc MD, Disp: 30 tablet, Rfl: 2    lisinopriL 10 MG tablet, TAKE 1 TABLET BY MOUTH DAILY, Disp: 10 tablet, Rfl: 0    [START ON 2022] zolpidem (AMBIEN) 10 mg Tab, Take 1 tablet (10 mg total) by mouth nightly as needed (INSOMNIA)., Disp: 30 tablet, Rfl: 2     Social History     Tobacco Use   Smoking Status Former    Packs/day: 1.00    Years: 15.00    Pack years: 15.00    Types: Cigarettes    Quit date: 2009    Years since quittin.5   Smokeless Tobacco Never        Review of patient's allergies indicates:   Allergen Reactions    Abilify [aripiprazole] Other (See Comments)     Weight gain 10 lbs    Remeron [mirtazapine] Other (See Comments)     Increased hunger and felt disoriented    Saphris [asenapine] Other (See Comments)     Muscle stiffness    Zoloft [sertraline] Other (See Comments)     Sex dysfunction (anorgasmia)    Zyprexa [olanzapine] Other (See Comments)     sluggish and emotionally flat      Impulse Control: no history SI / nor HI    Mental Status Exam:   Appearance: casual   Oriented: x 3  Attitude: cooperative   Eye Contact: good   Behavior:  calm / calm appearing / smiling at times   Mood: doing ok  Cognition: alert   Concentration: grossly intact   Affect: appropriate range   Anxiety: very mild  Thought Process: goal directed   Speech: Volume : WNL   Quantity WNL   Quality: appears to openly answer questions   Eye Contact: good   Threats: no SI / no HI   Psychosis: denies all   Estimate of Intellectual Function: above average     ASSESSMENT:   Encounter Diagnoses   Name Primary?    Bipolar 1 disorder, depressed Yes    ADHD (attention deficit hyperactivity disorder), combined type     Social anxiety disorder     Insomnia, unspecified type     Alcohol abuse, in FULL REMISSION sober 1 year 9-; in past 1/5 th a day      Cocaine abuse; full REMISSION x years     Family dynamic issues : dad's health, 16 yr old daughter living with ex wife / has seizures, other (see SW notes)     Nicotine dependence, chewing tobacco, uncomplicated          Patient Instructions     PLAN:     Follow UP      11/7/2022 10:00 AM ESTABLISHED PATIENT - [facility] Kyree Bhatt MD     Follow up Chris Alberto [facility] as arranged    Meds: see aftervisit summary / meds reviewed as prior     References: (reviewed with pt as well):    Anxiety &  phobia workbook by JULIANO Espitia PhD  (web retailers: used: $ 7-10)    Relaxation stress reduction workbook: ALFA Lal PhD ( used: $7-10)    Feeling Good Website: Kyree Sweeney MD / [email or web address] website (free) PODCASTS    [place]: Path to Better Sleep : https://www.veterantraining.va.gov/insomnia/ (free)     Pt expressed appreciation for the visit today and did not have further question at this time though pt  was still informed to:     Call  if problems.    Call / Report Side Effects to Psmeche SCHNEIDER     Encouraged to follow up with primary care / Gen Med MD for continued monitoring of general health and wellness.    Understanding was expressed; and no further concerns nor questions were raised at this time.  "    remember healthy self care:   eat right  attempt adequate rest   HANDWASHING / encourage such paris. During this corona virus time   walk or light exercise within reason and as your general med team approves  read or explore any of reference materials / homework mentioned  reach out (I.e.,  connect with)  others who nuture and bring out best in you  avoid risky behaviors  keep your appointments  IF you  cannot make your appt THEN please call or go online to reschedule.  avoid  alcohol and illicit substances.  Look for the positive.  All is often relative-seek balance  Call sooner if needed : [phone]   Call 911 or go to Emergency Room  (ER)  if any acute concerns    >>BACKGROUND from admit victoriano Romero Sept 2020 <<     Andrei collazo 52 y.o. Bipolar (depressed)  male presents today as referred by [facility] Que[facility] MD    Was seeing Carlos Barber MD for 8-10 yrs;he retired. 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 Dr" "Tegan has continued on meds as from Dr Segovia    Pt has been maintained on:  on lamictal 200 mg (no rash)   adderall XR 30   ambien  5mg    Sober x 1 yr as of 9- via AA; prior to that 9 and 1/2 yr after relapsed    Says has sponsor    Says 'to be up front have had substance issues: alcohol and cocaine     Social anxiety ' big struggle for long time' ; more recently though time leave house; always struggle; people who not know think I outgoing; tho says push through it; makes feel present someone I am not    prior Juliette SCHNEIDER: tried paxil zoloft tho no clear recall of how did: interest to try /    Never abilify saphroz    Says he did Tried effexor XR (75 mg) difficult sex side effect;  Denies any suicidal ideation / nor gesture / nor any thoughts to harm other     No psychosis    Self Rating Scales: (Such submitted for scanning) :     Quick Inventory of Depression (QID-Short Form):21  Zung Anxiety Index:78   Mood Disorder Questionnaire (MDQ): 8  The [place] Framework: a "bio-psycho-social" screening form for use as clinical raw data. / (submitted for scanning)      World Health Organization (W.H.O.) Adult ADHD Self Report Scale (ASRS-henrique 1.1):  5 of 6 core and 6 of 12 adjunctive    PAST PSYCHIATRIC HISTORY:     Inpatient: n  Outpatient: (incl. Primary Care): juliette Barber MD      Past Surgical History:   Procedure Laterality Date    APPENDECTOMY      COLONOSCOPY N/A 2/25/2019    Procedure: COLONOSCOPY;  Surgeon: Denis Eller MD;  [facility];  Service: Endoscopy;  Laterality: N/A;    KNEE SURGERY      right    SHOULDER SURGERY      bilateral    SINUS SURGERY      TONSILLECTOMY      VASECTOMY        Other (medical) :    Head Injury: n  Seizure: cocaine use / in 20s; late 30's (couple mre)  Diabetes :n  HTN Yes   Other:     Substance Use:    Once start [employer] avoid cannabis    When revert would isolate ; did for release ; drug choice was cocaine    Alcohol: last 9-8 2020;   Start: 10y  " "regular use: 12y UNTIL decaon (1st period sober) when left start back drinking  At 12y: every weekend: beer 6 12 oa  16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 / 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; 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 (2-6 beer)    Blackouts: deneis   No seizure:    Relationships: when drink / isolate / fear of work consequence IF they learned     Was drinking 5th a day (3 yr ago; "drank hard = 5th a day"); until passed out; had get up 4 a or 4;30a ; in fear breathalizer; combat by get [employer]     After deacon daily and coacine    Went rehab: [facility] 1 month 2008 ; 2 months prior: in [facility]: 21 days (out pocket private); none other    When went [place], self report drugs and alcohol; they said do not worry; when got back ; was sent psychiatrist tho said lied on questionairre as he said not drinknig so terminated    2 and 1/2 yrs later: he contact [facility] ; says was wrongfully terminated as self report; go job back    Says for 1st 2 y back weekly drug test and random; and employer said go see psyc MD and go to counselor ; NOW just random.    Cannabis: 5th grade 1st  Try; 8th grade : at least 1x week; daily use: 16y until 18y then when came across    Tobacco:former ; stopped 2008 / start 21 yrs olf  Cocaine:years ago   Benzo: n  Opioid: n  Ecstasy:n  Other:n     Grandparent: Drug problems brother drug problem   Parents uncles / depression   Daughter depression    Mother and aunt history significant anxiety     3 wishes? :  Serenity, comfortable in skin , (20y) daughter not have seizures or migranes     PSYCHO-SOCIAL DEVELOPMENT HISTORY:     City Born: [facility]    Siblings (full or half)  Brothers: 1 older 2 1/2   Sisters: none    Parents : alive     Briefly Describe  your Mom: poor health controlling   Briefly Describe your Dad: poor health unhappy anxious    Bio Mom: Occupation:    Bio Dad:  Occupation: car sales     Marital Status:  / x2 to same "  (15 y / 1995); 2 1/2 y [place] ([place] ; speak frequently ; she RN school     Children   Girls  (ages): 1 daughter 20 y: seizures migraines vertigo / depression anxiety / [employer] / 2nd yr journalism  Boys (ages):     Physical Abuse: N    Sexual abuse  N    Other trauma / abuse? 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 n

## 2022-08-29 NOTE — PATIENT INSTRUCTIONS
PLAN:     Follow UP      11/7/2022 10:00 AM ESTABLISHED PATIENT - [facility] Kyree Bhatt MD     Follow up Chris Alberto LCSW as arranged    Meds: see aftervisit summary / meds reviewed as prior     References: (reviewed with pt as well):    Anxiety &  phobia workbook by JULIANO Espitia PhD  (web retailers: used: $ 7-10)    Relaxation stress reduction workbook: ALFA Lal PhD ( used: $7-10)    Feeling Good Website: Kyree Sweeney MD / [email or web address] website (free) PODCASTS    [place]: Path to Better Sleep : https://www.veterantraining.[place].gov/insomnia/ (free)     Pt expressed appreciation for the visit today and did not have further question at this time though pt  was still informed to:     Call  if problems.    Call / Report Side Effects to Psyc MD     Encouraged to follow up with primary care / Gen Med MD for continued monitoring of general health and wellness.    Understanding was expressed; and no further concerns nor questions were raised at this time.     remember healthy self care:   eat right  attempt adequate rest   HANDWASHING / encourage such paris. During this corona virus time   walk or light exercise within reason and as your general med team approves  read or explore any of reference materials / homework mentioned  reach out (I.e.,  connect with)  others who nuture and bring out best in you  avoid risky behaviors  keep your appointments  IF you  cannot make your appt THEN please call or go online to reschedule.  avoid  alcohol and illicit substances.  Look for the positive.  All is often relative-seek balance  Call sooner if needed : [phone]   Call 911 or go to Emergency Room  (ER)  if any acute concerns

## 2022-08-30 DIAGNOSIS — F90.2 ADHD (ATTENTION DEFICIT HYPERACTIVITY DISORDER), COMBINED TYPE: ICD-10-CM

## 2022-08-31 NOTE — PROGRESS NOTES
"Individual Psychotherapy (PhD/LCSW)    8/29/2022    Site:  [facility]         Therapeutic Intervention: Met with patient.  Outpatient - Insight oriented psychotherapy 45 min - CPT code 77047    Chief complaint/reason for encounter: addictive disorder, depression, and anxiety     Interval history and content of current session:  Patient presents to ongoing individual therapy due to recovery from addiction, depression, and anxiety.  He was last in session on 1/24/22.  He began talking to an ex girlfriend on July 4th.  She had stopped their relationship previously to focus on her recovery and spirituality.  [place]  She has a six year old son.  She shares custody with the father and they have a contentious relationship.  The patient has been surprised with how much she has changed by getting deeper into her spirituality.  He went to [facility] with her and he felt that the  was talking to him.  He was surprised with how genuine the message was.  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  The patient felt that he had to be "perfect" to hold the position.  He was surprised to hear a  talking honestly about his own struggles.  The patient continues to date the lady.  He is no longer seeing his ex wife though they are friendly.  He details how she is accepted as a family member.  Note that he might finally be ready for a healthy relationship because of the work in his own recovery.  Praise the patient for setting boundaries with his ex wife.  Encourage the patient to work on being comfortable with the differences in a healthy relationship.  He has been surprised that he is okay with some of the limits they have put on the relationship.  He desired some of the limits himself.  His father is now dating a new woman.  He seems happy with her.  His daughter has moved into an apartment near campus.  She continues to have seizures which has prohibited her driving. However, she is near the " \[facility] [place] [facility]\"" bus line and a grocery store.  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       Treatment plan:  Target symptoms: depression, anxiety , grief  Why chosen therapy is appropriate versus another modality: relevant to diagnosis  Outcome monitoring methods: self-report, observation  Therapeutic intervention type: insight oriented psychotherapy, supportive psychotherapy, interactive psychotherapy     Risk parameters:  Patient reports no suicidal ideation  Patient reports no homicidal ideation  Patient reports no self-injurious behavior  Patient reports no violent behavior     Verbal deficits: None     Patient's response to intervention:  The patient's response to intervention is accepting, motivated.     Progress toward goals and other mental status changes:  The patient's progress toward goals is fair .     Diagnosis:   Bipolar disorder mixed moderate     Plan:  individual psychotherapy, medication management by physician and AA     Return to clinic: as scheduled     Length of Service (minutes):  45

## 2022-09-02 ENCOUNTER — PATIENT MESSAGE (OUTPATIENT)
Dept: PSYCHIATRY | Facility: CLINIC | Age: 52
End: 2022-09-02
Payer: COMMERCIAL

## 2022-09-02 RX ORDER — DEXTROAMPHETAMINE SACCHARATE, AMPHETAMINE ASPARTATE MONOHYDRATE, DEXTROAMPHETAMINE SULFATE AND AMPHETAMINE SULFATE 7.5; 7.5; 7.5; 7.5 MG/1; MG/1; MG/1; MG/1
60 CAPSULE, EXTENDED RELEASE ORAL EVERY MORNING
Qty: 60 CAPSULE | Refills: 0 | OUTPATIENT
Start: 2022-09-02 | End: 2022-10-02

## 2022-10-20 ENCOUNTER — PATIENT MESSAGE (OUTPATIENT)
Dept: ADMINISTRATIVE | Facility: HOSPITAL | Age: 52
End: 2022-10-20
Payer: COMMERCIAL

## 2022-10-28 ENCOUNTER — PATIENT MESSAGE (OUTPATIENT)
Dept: PSYCHIATRY | Facility: CLINIC | Age: 52
End: 2022-10-28
Payer: COMMERCIAL

## 2022-10-28 ENCOUNTER — TELEPHONE (OUTPATIENT)
Dept: PSYCHIATRY | Facility: CLINIC | Age: 52
End: 2022-10-28

## 2022-11-07 ENCOUNTER — OFFICE VISIT (OUTPATIENT)
Dept: PSYCHIATRY | Facility: CLINIC | Age: 52
End: 2022-11-07
Payer: COMMERCIAL

## 2022-11-07 DIAGNOSIS — F31.9 BIPOLAR 1 DISORDER, DEPRESSED: Primary | ICD-10-CM

## 2022-11-07 DIAGNOSIS — F90.2 ADHD (ATTENTION DEFICIT HYPERACTIVITY DISORDER), COMBINED TYPE: ICD-10-CM

## 2022-11-07 DIAGNOSIS — F14.10 COCAINE ABUSE: ICD-10-CM

## 2022-11-07 DIAGNOSIS — G47.00 INSOMNIA, UNSPECIFIED TYPE: ICD-10-CM

## 2022-11-07 DIAGNOSIS — F10.11 ALCOHOL ABUSE, IN REMISSION: ICD-10-CM

## 2022-11-07 DIAGNOSIS — F40.10 SOCIAL ANXIETY DISORDER: ICD-10-CM

## 2022-11-07 PROBLEM — Z63.9 FAMILY DYNAMICS PROBLEM: Status: RESOLVED | Noted: 2021-10-18 | Resolved: 2022-11-07

## 2022-11-07 PROCEDURE — 99214 OFFICE O/P EST MOD 30 MIN: CPT | Mod: 95,S$GLB,, | Performed by: PSYCHIATRY & NEUROLOGY

## 2022-11-07 PROCEDURE — 99214 PR OFFICE/OUTPT VISIT, EST, LEVL IV, 30-39 MIN: ICD-10-PCS | Mod: 95,S$GLB,, | Performed by: PSYCHIATRY & NEUROLOGY

## 2022-11-07 PROCEDURE — 99999 PR PBB SHADOW E&M-EST. PATIENT-LVL II: CPT | Mod: PBBFAC,,, | Performed by: PSYCHIATRY & NEUROLOGY

## 2022-11-07 PROCEDURE — 99999 PR PBB SHADOW E&M-EST. PATIENT-LVL II: ICD-10-PCS | Mod: PBBFAC,,, | Performed by: PSYCHIATRY & NEUROLOGY

## 2022-11-07 RX ORDER — DEXTROAMPHETAMINE SACCHARATE, AMPHETAMINE ASPARTATE MONOHYDRATE, DEXTROAMPHETAMINE SULFATE AND AMPHETAMINE SULFATE 7.5; 7.5; 7.5; 7.5 MG/1; MG/1; MG/1; MG/1
60 CAPSULE, EXTENDED RELEASE ORAL EVERY MORNING
Qty: 60 CAPSULE | Refills: 0 | Status: SHIPPED | OUTPATIENT
Start: 2022-11-26 | End: 2022-11-30

## 2022-11-07 RX ORDER — LAMOTRIGINE 200 MG/1
200 TABLET ORAL DAILY
Qty: 30 TABLET | Refills: 2 | Status: SHIPPED | OUTPATIENT
Start: 2022-11-07 | End: 2023-01-23 | Stop reason: SDUPTHER

## 2022-11-07 RX ORDER — ZOLPIDEM TARTRATE 10 MG/1
10 TABLET ORAL NIGHTLY PRN
Qty: 30 TABLET | Refills: 2 | Status: SHIPPED | OUTPATIENT
Start: 2022-11-26 | End: 2023-01-23 | Stop reason: SDUPTHER

## 2022-11-07 RX ORDER — DEXTROAMPHETAMINE SACCHARATE, AMPHETAMINE ASPARTATE MONOHYDRATE, DEXTROAMPHETAMINE SULFATE AND AMPHETAMINE SULFATE 7.5; 7.5; 7.5; 7.5 MG/1; MG/1; MG/1; MG/1
60 CAPSULE, EXTENDED RELEASE ORAL EVERY MORNING
Qty: 60 CAPSULE | Refills: 0 | Status: SHIPPED | OUTPATIENT
Start: 2023-01-25 | End: 2023-01-23

## 2022-11-07 RX ORDER — DEXTROAMPHETAMINE SACCHARATE, AMPHETAMINE ASPARTATE MONOHYDRATE, DEXTROAMPHETAMINE SULFATE AND AMPHETAMINE SULFATE 7.5; 7.5; 7.5; 7.5 MG/1; MG/1; MG/1; MG/1
60 CAPSULE, EXTENDED RELEASE ORAL EVERY MORNING
Qty: 60 CAPSULE | Refills: 0 | Status: SHIPPED | OUTPATIENT
Start: 2022-12-26 | End: 2022-12-30 | Stop reason: SDUPTHER

## 2022-11-07 NOTE — PROGRESS NOTES
Andrei Casanova   1970 11/07/2022     Disclaimer: Evaluation and treatment is based on information presented to date. Any new information may affect assessment and findings.    The patient location is: Patient's home   and reported  that his/her location at the time of this visit was in the [facility]     Visit type: Virtual visit with synchronous audio and video     Each patient to whom he or she provides medical services by telemedicine is: (1) informed of the relationship between the physician and patient and the respective role of any other health care provider with respect to management of the patient; and (2) notified that he or she may decline to receive medical services by telemedicine and may withdraw from such care at any time.    Patient was informed that I am a physician who is licensed in the [facility]:  Kyree Bhatt MD:  Employed by   [facility]     If technology issues arise: YUAN Bhatt MD will attempt to call pt back tho pt also instructed that he/she may  call our office phone at: [phone]    Pt informed that if he / she is ever in crisis (or has acute concerns): pt is instructed to Dial 911 or go to nearest Emergency Room (ER)    Pt informed that if questions related to privacy practices: pt is instructed to contact [facility] Information Department: [phone]    Understanding Expressed. No questions.    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 [email or web address]    Work going well at [employer]      Medication: no longer on neuroleptic ; off  For some  "time; he  Tried Abilify  10 lb (wt gain)    then D/C Saphris (muscle stiffness)  Zyprexa (sluggish and blah)    Remains Adderall XR 30 mg (TWO)  Daily ; remains on Lamictal ; no rash;  likes it     Continues with Lamictal (200mg) and Adderall XR 30  Mg (2 a day);  And Ambien 10 mg / likes his medication regimen; has done well on such    Reports remains 'clean / sober' says  speaks to sponsor everyday ; got 3 yr chip esterday     Says work going fine;  Says looking at retiring around   to ; reports that he has received 3 raises in 12 months which favorably affects his alf as well    mother passed  ;  dad remains living in [place]; getting along ok    Has worked with  Social Work (Chris Alberto LCSW) : Includes in part Family of Origin issues and  BROTHER passed 2020 ALSO of Covid. MOM PASSED AWAY of [facility]  as [place] for his mother's  even during covid ; says he will get back in with Chris Alberto LCSW and knows how to schedule himself     Volunteering at [employer] (Andrei Briones counselor) ; he liked such ; goes to [facility]; seems to take pride / value in participating there; "helping others"    Safety:no SI / no HI     Supports: enjoys his daughter ; she graduated in  journalism recently; (he is concerned about her seizure history)     Work / School     Activities tends like more solitarty activities / notes:  some social avoidant    Staying "Sober / "clean"  (I.e., Substance issue): says has sponsor / and participates some in AA    Constitutional Health Concerns:       Review of Systems   HENT: Negative.     Eyes: Negative.    Respiratory: Negative.     Cardiovascular: Negative.    Gastrointestinal: Negative.    Musculoskeletal: Negative.    Skin: Negative.    Neurological: Negative.    Endo/Heme/Allergies: Negative.     Musculoskeletal: no tremor no stiffness       Patient Active Problem List   Diagnosis    ADD (attention deficit disorder)    Bipolar " affective    HTN (hypertension)    Alcohol abuse, in remission    Chews tobacco regularly    Mixed hyperlipidemia    Colon cancer screening    Special screening for malignant neoplasms, colon    Family history of colonic polyps    Bipolar 1 disorder, depressed    Social anxiety disorder    ADHD (attention deficit hyperactivity disorder), combined type    Cocaine abuse; full remission x years    Nicotine dependence, chewing tobacco, uncomplicated    Insomnia    Grief: Mom  Covid Dec 2020    Erythrocytosis    Prostatitis          Current Outpatient Medications:     amLODIPine (NORVASC) 5 MG tablet, TAKE 1 TABLET BY MOUTH DAILY, Disp: 10 tablet, Rfl: 0    [START ON 2022] dextroamphetamine-amphetamine (ADDERALL XR) 30 MG 24 hr capsule, Take 2 capsules (60 mg total) by mouth every morning., Disp: 60 capsule, Rfl: 0    [START ON 2022] dextroamphetamine-amphetamine (ADDERALL XR) 30 MG 24 hr capsule, Take 2 capsules (60 mg total) by mouth every morning., Disp: 60 capsule, Rfl: 0    [START ON 2023] dextroamphetamine-amphetamine (ADDERALL XR) 30 MG 24 hr capsule, Take 2 capsules (60 mg total) by mouth every morning., Disp: 60 capsule, Rfl: 0    lamoTRIgine (LAMICTAL) 200 MG tablet, Take 1 tablet (200 mg total) by mouth once daily. STOP all Lamictal IF any RASH and tell Psyc MD, Disp: 30 tablet, Rfl: 2    lisinopriL 10 MG tablet, TAKE 1 TABLET BY MOUTH DAILY, Disp: 10 tablet, Rfl: 0    [START ON 2022] zolpidem (AMBIEN) 10 mg Tab, Take 1 tablet (10 mg total) by mouth nightly as needed (INSOMNIA)., Disp: 30 tablet, Rfl: 2     Social History     Tobacco Use   Smoking Status Former    Packs/day: 1.00    Years: 15.00    Pack years: 15.00    Types: Cigarettes    Quit date: 2009    Years since quittin.7   Smokeless Tobacco Never        Review of patient's allergies indicates:   Allergen Reactions    Abilify [aripiprazole] Other (See Comments)     Weight gain 10 lbs    Remeron [mirtazapine] Other  (See Comments)     Increased hunger and felt disoriented    Saphris [asenapine] Other (See Comments)     Muscle stiffness    Zoloft [sertraline] Other (See Comments)     Sex dysfunction (anorgasmia)    Zyprexa [olanzapine] Other (See Comments)     sluggish and emotionally flat      Impulse Control: no history SI / nor HI    Mental Status Exam:   Appearance: casual   Oriented: x 3  Attitude: cooperative   Eye Contact: good   Behavior: calm / smiling at times   Mood: doing ok  Cognition: alert   Concentration: grossly intact   Affect: appropriate range   Anxiety: very mild  Thought Process: goal directed   Speech: Volume : WNL   Quantity WNL   Quality: appears to openly answer questions   Eye Contact: good   Threats: no SI / no HI   Psychosis: denies all   Estimate of Intellectual Function: above average     ASSESSMENT:   Encounter Diagnoses   Name Primary?    Bipolar 1 disorder, depressed Yes    ADHD (attention deficit hyperactivity disorder), combined type     Social anxiety disorder     Insomnia, unspecified type     Alcohol abuse, in FULL REMISSION sober 1 year 9-; in past 1/5 th a day      Cocaine abuse; full REMISSION x years            Patient Instructions     PLAN:     Follow UP  D Post MD Dec 20 2022 @ 9:30a  TELEHEALTH     Follow up Chris Alberto LCSW as arranged    Meds: see aftervisit summary / meds reviewed as prior     References: (reviewed with pt as well):    Anxiety &  phobia workbook by JULIANO Espitia PhD  (web retailers: used: $ 7-10)    Relaxation stress reduction workbook: ALFA Lal PhD ( used: $7-10)    Feeling Good Website: Kyree Sweeney MD / [email or web address] website (free) PODCASTS    [place]: Path to Better Sleep : https://www.veterantraining.va.gov/insomnia/ (free)     Pt expressed appreciation for the visit today and did not have further question at this time though pt  was still informed to:     Call  if problems.    Call / Report Side Effects to Psyc MD     Encouraged to follow up  "with primary care / Gen Med MD for continued monitoring of general health and wellness.    Understanding was expressed; and no further concerns nor questions were raised at this time.     remember healthy self care:   eat right  attempt adequate rest   HANDWASHING / encourage such paris. During this corona virus time   walk or light exercise within reason and as your general med team approves  read or explore any of reference materials / homework mentioned  reach out (I.e.,  connect with)  others who nuture and bring out best in you  avoid risky behaviors  keep your appointments  IF you  cannot make your appt THEN please call or go online to reschedule.  avoid  alcohol and illicit substances.  Look for the positive.  All is often relative-seek balance  Call sooner if needed : [phone]   Call 911 or go to Emergency Room  (ER)  if any acute concerns    >>BACKGROUND from admit victoriano Romero Sept 2020 <<     Andrei collazo 52 y.o. Bipolar (depressed)  male presents today as referred by [facility] MD    Was seeing Carlos Barber MD for 8-10 yrs;he retired. 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 Dr Javed has continued on meds as from Dr Segovia    Pt has been maintained on:  on lamictal 200 mg (no rash)   adderall XR 30   ambien  5mg    Sober x 1 yr as of 9- via AA; prior to that 9 and 1/2 yr after relapsed    Says has sponsor    Says 'to be up front have had substance issues: alcohol and cocaine     Social anxiety ' big struggle for long time' ; more recently though time leave house; always struggle; people who not know think I outgoing; tho says push through it; makes feel present someone I am not    prior Juliette SCHNEIDER: tried paxil zoloft tho no clear recall of how did: interest to try /    Never abilify jack    Says he did Tried effexor XR (75 mg) difficult sex side effect;  Denies any suicidal ideation / nor gesture / nor any thoughts to harm other     No psychosis    Self Rating Scales: (Such submitted for scanning) :     Quick Inventory of Depression (QID-Short Form):21  Zung Anxiety Index:78   Mood Disorder Questionnaire (MDQ): 8  The [employer] Framework: a "bio-psycho-social" screening form for use as clinical raw data. / (submitted for scanning)      World Health Organization (W.H.O.) Adult ADHD Self Report Scale (ASRS-henrique 1.1):  5 of 6 core and 6 of 12 adjunctive    PAST PSYCHIATRIC HISTORY:     Inpatient: n  Outpatient: (incl. 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    When revert would isolate ; did for release ; drug choice was cocaine    Alcohol: last 9-8 2020;   Start: 10y  regular use: 12y UNTIL decaon (1st period sober) when left start back drinking  At 12y: every weekend: beer 6 12 oa  16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 / 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; 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 (2-6 beer)    Blackouts: deneis   No seizure:    Relationships: when drink / isolate / fear of work consequence IF they learned     Was drinking 5th a day (3 yr ago; "drank hard = 5th a day"); until passed out; had get up 4 a or 4;30a ; in fear breathalizer; combat by [employer]     After deacon daily and cocaine    Went rehab: [facility] 1 month 2008 ; 2 months prior: in [facility]: 21 days (out pocket private); none other    When went [place], self report drugs and alcohol; they said do not worry; when got back ; was sent psychiatrist tho said lied on questionairre as he said not drinknig so terminated    2 and 1/2 yrs later: he contact [facility] ; says was wrongfully terminated as self report; go job back    Says for 1st 2 y back weekly drug test and random; and employer said go see psyc MD and go to counselor ; NOW just random.    Cannabis: 5th grade 1st  Try; 8th grade : at least 1x week; daily use: 16y until 18y then when came across    Tobacco:former ; stopped 2008 / start 21 yrs olf  Cocaine:years ago   Benzo: n  Opioid: n  Ecstasy:n  Other:n     Grandparent: Drug problems brother drug problem   Parents uncles / depression   Daughter depression    Mother and aunt history significant anxiety     3 wishes? :  Serenity, comfortable in skin , (20y) daughter not have seizures or migranes     PSYCHO-SOCIAL DEVELOPMENT HISTORY:     City Born: [facility]    Siblings (full or half)  Brothers: 1 older 2 1/2   Sisters: none    Parents : alive     Briefly Describe  your Mom: poor " health controlling   Briefly Describe your Dad: poor health unhappy anxious    Bio Mom: Occupation:    Bio Dad:  Occupation: car sales     Marital Status:  / x2 to same lady (15 y / 1995); 2 1/2 y Neida (lizzette gomez [place]) ; speak frequently ; she RN school     Children   Girls  (ages): 1 daughter 20 y: seizures migraines vertigo / depression anxiety / [employer] / 2nd yr journalism  Boys (ages):     Physical Abuse: N    Sexual abuse  N    Other trauma / abuse? 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 n

## 2022-11-07 NOTE — PATIENT INSTRUCTIONS
PLAN:     Follow UP  D Post MD Dec 20 2022 @ 9:30a  TELEHEALTH     Follow up Chris Alberto LCSW as arranged    Meds: see aftervisit summary / meds reviewed as prior     References: (reviewed with pt as well):    Anxiety &  phobia workbook by JULIANO Espitia PhD  (web retailers: used: $ 7-10)    Relaxation stress reduction workbook: ALFA Lal PhD ( used: $7-10)    Feeling Good Website: Kyree Sweeney MD / [email or web address] website (free) PODCASTS    [place]: Path to Better Sleep : https://www.veterantraining.[place].gov/insomnia/ (free)     Pt expressed appreciation for the visit today and did not have further question at this time though pt  was still informed to:     Call  if problems.    Call / Report Side Effects to Psyc MD     Encouraged to follow up with primary care / Gen Med MD for continued monitoring of general health and wellness.    Understanding was expressed; and no further concerns nor questions were raised at this time.     remember healthy self care:   eat right  attempt adequate rest   HANDWASHING / encourage such [place]is. During this corona virus time   walk or light exercise within reason and as your general med team approves  read or explore any of reference materials / homework mentioned  reach out (I.e.,  connect with)  others who nuture and bring out best in you  avoid risky behaviors  keep your appointments  IF you  cannot make your appt THEN please call or go online to reschedule.  avoid  alcohol and illicit substances.  Look for the positive.  All is often relative-seek balance  Call sooner if needed : [phone]   Call 911 or go to Emergency Room  (ER)  if any acute concerns

## 2022-11-28 ENCOUNTER — PATIENT MESSAGE (OUTPATIENT)
Dept: PSYCHIATRY | Facility: CLINIC | Age: 52
End: 2022-11-28
Payer: COMMERCIAL

## 2022-11-28 ENCOUNTER — TELEPHONE (OUTPATIENT)
Dept: PSYCHIATRY | Facility: CLINIC | Age: 52
End: 2022-11-28

## 2022-11-29 ENCOUNTER — TELEPHONE (OUTPATIENT)
Dept: PSYCHIATRY | Facility: CLINIC | Age: 52
End: 2022-11-29
Payer: COMMERCIAL

## 2022-11-30 DIAGNOSIS — F90.2 ADHD (ATTENTION DEFICIT HYPERACTIVITY DISORDER), COMBINED TYPE: Primary | ICD-10-CM

## 2022-11-30 RX ORDER — DEXTROAMPHETAMINE SACCHARATE, AMPHETAMINE ASPARTATE MONOHYDRATE, DEXTROAMPHETAMINE SULFATE AND AMPHETAMINE SULFATE 7.5; 7.5; 7.5; 7.5 MG/1; MG/1; MG/1; MG/1
CAPSULE, EXTENDED RELEASE ORAL
Qty: 60 CAPSULE | Refills: 0 | Status: SHIPPED | OUTPATIENT
Start: 2022-11-30 | End: 2023-01-23

## 2022-11-30 NOTE — TELEPHONE ENCOUNTER
Called him     Informed I was told our pharmacy out of all adderall products    Our pharmacy working to get supply     Noted that not only [facility] tho many major retailers    That our pharmacy working on it     Also I will be inquiring re vyvanse    IF I find adderall or alternatives will let him know REYNOLD also told him IF HE locates to let me know and I will address soon as able.    D Miller SCHNEIDER

## 2022-11-30 NOTE — PROGRESS NOTES
Wrote fro 1 month adderall XR 30 mg to [facility] not have spoke ester [facility] [employer] ho says has       Take 2 capsules (60 mg total) by mouth every morning.     dp

## 2022-12-22 ENCOUNTER — PATIENT OUTREACH (OUTPATIENT)
Dept: ADMINISTRATIVE | Facility: HOSPITAL | Age: 52
End: 2022-12-22
Payer: COMMERCIAL

## 2022-12-22 NOTE — PROGRESS NOTES
Attempted to contact patient by phone for PCP visit, was able to speak to patient. Patient was rude. Asked if I could replace PCP with who he see. 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

## 2022-12-30 ENCOUNTER — PATIENT MESSAGE (OUTPATIENT)
Dept: PSYCHIATRY | Facility: CLINIC | Age: 52
End: 2022-12-30
Payer: COMMERCIAL

## 2022-12-30 ENCOUNTER — TELEPHONE (OUTPATIENT)
Dept: PSYCHIATRY | Facility: CLINIC | Age: 52
End: 2022-12-30
Payer: COMMERCIAL

## 2022-12-30 NOTE — TELEPHONE ENCOUNTER
Got instant message about this and asked Debra [facility]  to call me:    In Nov had moved to [facility] in [facility] tho opt is NOW at the [facility] desiring; spoke to Debra and will put this Dec rx thru to [facility]     Will ERx Adderall XR 30 mg 2 caps in morning # 60     Kayden ERx remains Lucia BOLDEN Post MD

## 2023-01-23 ENCOUNTER — OFFICE VISIT (OUTPATIENT)
Dept: PSYCHIATRY | Facility: CLINIC | Age: 53
End: 2023-01-23
Payer: COMMERCIAL

## 2023-01-23 DIAGNOSIS — G47.00 INSOMNIA, UNSPECIFIED TYPE: ICD-10-CM

## 2023-01-23 DIAGNOSIS — F17.220 NICOTINE DEPENDENCE, CHEWING TOBACCO, UNCOMPLICATED: ICD-10-CM

## 2023-01-23 DIAGNOSIS — F14.10 COCAINE ABUSE: ICD-10-CM

## 2023-01-23 DIAGNOSIS — F90.2 ADHD (ATTENTION DEFICIT HYPERACTIVITY DISORDER), COMBINED TYPE: ICD-10-CM

## 2023-01-23 DIAGNOSIS — F40.10 SOCIAL ANXIETY DISORDER: ICD-10-CM

## 2023-01-23 DIAGNOSIS — F31.9 BIPOLAR 1 DISORDER, DEPRESSED: Primary | ICD-10-CM

## 2023-01-23 DIAGNOSIS — F10.11 ALCOHOL ABUSE, IN REMISSION: ICD-10-CM

## 2023-01-23 PROCEDURE — 99214 OFFICE O/P EST MOD 30 MIN: CPT | Mod: 95,,, | Performed by: PSYCHIATRY & NEUROLOGY

## 2023-01-23 PROCEDURE — 99214 PR OFFICE/OUTPT VISIT, EST, LEVL IV, 30-39 MIN: ICD-10-PCS | Mod: 95,,, | Performed by: PSYCHIATRY & NEUROLOGY

## 2023-01-23 RX ORDER — DEXTROAMPHETAMINE SACCHARATE, AMPHETAMINE ASPARTATE MONOHYDRATE, DEXTROAMPHETAMINE SULFATE AND AMPHETAMINE SULFATE 7.5; 7.5; 7.5; 7.5 MG/1; MG/1; MG/1; MG/1
60 CAPSULE, EXTENDED RELEASE ORAL EVERY MORNING
Qty: 60 CAPSULE | Refills: 0 | Status: SHIPPED | OUTPATIENT
Start: 2023-01-27 | End: 2023-03-01

## 2023-01-23 RX ORDER — LAMOTRIGINE 200 MG/1
200 TABLET ORAL DAILY
Qty: 30 TABLET | Refills: 3 | Status: SHIPPED | OUTPATIENT
Start: 2023-01-23 | End: 2023-04-18 | Stop reason: SDUPTHER

## 2023-01-23 RX ORDER — DEXTROAMPHETAMINE SACCHARATE, AMPHETAMINE ASPARTATE MONOHYDRATE, DEXTROAMPHETAMINE SULFATE AND AMPHETAMINE SULFATE 7.5; 7.5; 7.5; 7.5 MG/1; MG/1; MG/1; MG/1
60 CAPSULE, EXTENDED RELEASE ORAL EVERY MORNING
Qty: 60 CAPSULE | Refills: 0 | Status: SHIPPED | OUTPATIENT
Start: 2023-02-24 | End: 2023-03-31

## 2023-01-23 RX ORDER — DEXTROAMPHETAMINE SACCHARATE, AMPHETAMINE ASPARTATE MONOHYDRATE, DEXTROAMPHETAMINE SULFATE AND AMPHETAMINE SULFATE 7.5; 7.5; 7.5; 7.5 MG/1; MG/1; MG/1; MG/1
60 CAPSULE, EXTENDED RELEASE ORAL EVERY MORNING
Qty: 60 CAPSULE | Refills: 0 | Status: SHIPPED | OUTPATIENT
Start: 2023-03-27 | End: 2023-04-18 | Stop reason: SDUPTHER

## 2023-01-23 RX ORDER — ZOLPIDEM TARTRATE 10 MG/1
10 TABLET ORAL NIGHTLY PRN
Qty: 30 TABLET | Refills: 3 | Status: SHIPPED | OUTPATIENT
Start: 2023-01-23 | End: 2023-04-18 | Stop reason: SDUPTHER

## 2023-01-23 NOTE — PROGRESS NOTES
Andrei Casanova   1970 01/23/2023     Disclaimer: Evaluation and treatment is based on information presented to date. Any new information may affect assessment and findings.    The patient location is: Patient's home   and reported  that his home in [place], [place]    Visit type: Virtual visit with synchronous audio and video     Each patient to whom he or she provides medical services by telemedicine is: (1) informed of the relationship between the physician and patient and the respective role of any other health care provider with respect to management of the patient; and (2) notified that he or she may decline to receive medical services by telemedicine and may withdraw from such care at any time.    Patient was informed that I am a physician who is licensed in the [facility]:  Kyree Bhatt MD:  Employed by   [facility] [employer]     If technology issues arise: YUAN Bhatt MD will attempt to call pt back th pt also instructed that he/she may  call our office phone at: [phone]    Pt informed that if he / she is ever in crisis (or has acute concerns): pt is instructed to Dial 911 or go to nearest Emergency Room (ER)    Pt informed that if questions related to privacy practices: pt is instructed to contact [facility] Information Department: [phone]    Understanding Expressed. No questions.    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    S: Patient's Own Perception of Condition (& Side Effects) : none ; says  no chest pain, no lightheadedness nor other reported difficulty ; says very productive ; no rash     O:     Continues working going   [employer] in [facility] area    Smiling / content  ;  "says 2023 that he was Promoted at work ;  has new duties as work shortages / reminded to be mindful to not put 'too much on his plate'  / understanding expressed ; says at present not an issue    also says (2023) that he is now  dating lady from [place] who attends his Baptism ; he finds going to [facility] or [place];    Has not been in to see Chris Alberto LCSW since Aug 2022 ; suggested he consider a follow up; says probably good idea he will consider    Continues being sponsor for others in AA    Medication: no longer on neuroleptic ; off  For some time; he  Tried Abilify  10 lb (wt gain)    then D/C Saphris (muscle stiffness)  Zyprexa (sluggish and blah)    Remains Adderall XR 30 mg (TWO)  Daily ; remains on Lamictal ; no rash;  likes it     Continues with Lamictal (200mg) and Adderall XR 30  Mg (2 a day);  And Ambien 10 mg / likes his medication regimen; has done well on such    Reports remains 'clean / sober' says  speaks to sponsor everyday ; got 3 yr chip esterday     Says work going fine;  Says looking at retiring around   to ; reports that he has received 3 raises in 12 months which favorably affects his MCC as well    mother passed  ;  dad remains living in [place]; getting along ok    Has worked with  Social Work (Chris Alberto LCSW) : Includes in part Family of Origin issues and  BROTHER passed 2020 ALSO of Covid. MOM PASSED AWAY of [facility]  as [place] for his mother's  even during covid ; says he will get back in with Chris Alberto LCSW and knows how to schedule himself     Volunteering at [place] (Andrei Briones counselor) ; he liked such ; goes to [facility]; seems to take pride / value in participating there; "helping others" every Monday night he goes help out    Safety:no SI / no HI     Supports: enjoys his daughter ; she near graduation in  [employer] recently; (he is concerned about her seizure history)     Work / " "School     Activities tends like more solitarty activities / notes:  some social avoidant    Staying "Sober / "clean"  (I.e., Substance issue): says has sponsor / and participates some in AA    Constitutional Health Concerns:       Review of Systems   HENT: Negative.     Eyes: Negative.    Respiratory: Negative.     Cardiovascular: Negative.    Gastrointestinal: Negative.    Musculoskeletal: Negative.    Skin: Negative.    Neurological: Negative.    Endo/Heme/Allergies: Negative.     Musculoskeletal: no tremor no stiffness       Patient Active Problem List   Diagnosis    ADD (attention deficit disorder)    Bipolar affective    HTN (hypertension)    Alcohol abuse, in remission    Chews tobacco regularly    Mixed hyperlipidemia    Colon cancer screening    Special screening for malignant neoplasms, colon    Family history of colonic polyps    Bipolar 1 disorder, depressed    Social anxiety disorder    ADHD (attention deficit hyperactivity disorder), combined type    Cocaine abuse; full remission x years    Nicotine dependence, chewing tobacco, uncomplicated    Insomnia    Grief: Mom  Covid Dec 2020    Erythrocytosis    Prostatitis          Current Outpatient Medications:     amLODIPine (NORVASC) 5 MG tablet, TAKE 1 TABLET BY MOUTH DAILY, Disp: 10 tablet, Rfl: 0    [START ON 2023] dextroamphetamine-amphetamine (ADDERALL XR) 30 MG 24 hr capsule, Take 2 capsules (60 mg total) by mouth every morning., Disp: 60 capsule, Rfl: 0    [START ON 2023] dextroamphetamine-amphetamine (ADDERALL XR) 30 MG 24 hr capsule, Take 2 capsules (60 mg total) by mouth every morning., Disp: 60 capsule, Rfl: 0    [START ON 3/27/2023] dextroamphetamine-amphetamine (ADDERALL XR) 30 MG 24 hr capsule, Take 2 capsules (60 mg total) by mouth every morning., Disp: 60 capsule, Rfl: 0    lamoTRIgine (LAMICTAL) 200 MG tablet, Take 1 tablet (200 mg total) by mouth once daily. STOP all Lamictal IF any RASH and tell Psyc MD, Disp: 30 tablet, " Rfl: 3    lisinopriL 10 MG tablet, TAKE 1 TABLET BY MOUTH DAILY, Disp: 10 tablet, Rfl: 0    zolpidem (AMBIEN) 10 mg Tab, Take 1 tablet (10 mg total) by mouth nightly as needed (INSOMNIA)., Disp: 30 tablet, Rfl: 3     Social History     Tobacco Use   Smoking Status Former    Packs/day: 1.00    Years: 15.00    Pack years: 15.00    Types: Cigarettes    Quit date: 2009    Years since quittin.9   Smokeless Tobacco Never        Review of patient's allergies indicates:   Allergen Reactions    Abilify [aripiprazole] Other (See Comments)     Weight gain 10 lbs    Remeron [mirtazapine] Other (See Comments)     Increased hunger and felt disoriented    Saphris [asenapine] Other (See Comments)     Muscle stiffness    Zoloft [sertraline] Other (See Comments)     Sex dysfunction (anorgasmia)    Zyprexa [olanzapine] Other (See Comments)     sluggish and emotionally flat      Impulse Control: no history SI / nor HI    Mental Status Exam:   Appearance: casual   Oriented: x 3  Attitude: cooperative   Eye Contact: good   Behavior: calm / smiling at times   Mood: doing ok  Cognition: alert   Concentration: grossly intact   Affect: appropriate range   Anxiety: very mild  Thought Process: goal directed   Speech: Volume : WNL   Quantity WNL   Quality: appears to openly answer questions   Eye Contact: good   Threats: no SI / no HI   Psychosis: denies all   Estimate of Intellectual Function: above average     ASSESSMENT:   Encounter Diagnoses   Name Primary?    Bipolar 1 disorder, depressed (2023: doing well on current med regimen (incl Lamictal 200 mg daily) Yes    Social anxiety disorder     ADHD (attention deficit hyperactivity disorder), combined type     Insomnia, unspecified type     Alcohol abuse, in FULL REMISSION sober 1 year 2020; in past 1/5 th a day      Cocaine abuse; full REMISSION x years     Nicotine dependence, chewing tobacco, uncomplicated          Patient Instructions     PLAN:     D Post Elisabeth SCHNEIDER  informed pt that he is moving to [facility] [place] March 2023; he was given option remain Telly tirado provider pradip asks to remain in [place] [facility] [place] ; discussed such mutually agree ; told IF any issues to let me know pradip will flow request to [facility] to make appt for him on [place] [facility] Schedule for [place] mid April     Informed pt that IF for some reason that no prescriber appt  in place by late Feb / (no later than March 1 2023) THEN pt needs to message via my chart to assit in getting on D Post MD glendy lackey     Follow up Chris Alberto LCSW as arranged    Meds: see aftervisit summary / no change / meds reviewed as prior     References: (reviewed with pt as well):    Anxiety &  phobia workbook by JULIANO Espitia PhD  (web retailers: used: $ 7-10)    Relaxation stress reduction workbook: ALFA Lal PhD ( used: $7-10)    Feeling Good Website: Kyree Sweeney MD / www.[employer] website (free) PODCASTS    [place]: Path to Better Sleep : https://www.veterantraining.va.gov/insomnia/ (free)     Pt expressed appreciation for the visit today and did not have further question at this time though pt  was still informed to:     Call  if problems.    Call / Report Side Effects to Psyc MD     Encouraged to follow up with primary care / Gen Med MD for continued monitoring of general health and wellness.    Understanding was expressed; and no further concerns nor questions were raised at this time.     remember healthy self care:   eat right  attempt adequate rest   HANDWASHING / encourage such paris. During this corona virus time   walk or light exercise within reason and as your general med team approves  read or explore any of reference materials / homework mentioned  reach out (I.e.,  connect with)  others who nuture and bring out best in you  avoid risky behaviors  keep your appointments  IF you  cannot make your appt THEN please call or go online to reschedule.  avoid   "alcohol and illicit substances.  Look for the positive.  All is often relative-seek balance  Call sooner if needed : [phone]   Call 911 or go to Emergency Room  (ER)  if any acute concerns      >>BACKGROUND from admit victoriano Romero Sept 2020 <<     Andrei GARIMA Casanova vale 52 y.o. Bipolar (depressed)  male presents today as [facility] by [facility]    Was seeing Carlos Barber MD for 8-10 yrs;he retired. 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 Dr Javed has continued on meds as from Dr Segovia    Pt has been maintained on:  on lamictal 200 mg (no rash)   adderall XR 30   ambien  5mg    Sober x 1 yr as of 9- via AA; prior to that 9 and 1/2 yr after relapsed    Says has sponsor    Says 'to be up front have had substance issues: alcohol and cocaine     Social anxiety ' big struggle for long time' ; more recently though time leave house; always struggle; people who not know think I outgoing; tho says " "push through it; makes feel present someone I am not    prior Juliette SCHNEIDER: tried paxil zoloft tho no clear recall of how did: interest to try /    Never dayana santiago    Says he did Tried effexor XR (75 mg) difficult sex side effect;  Denies any suicidal ideation / nor gesture / nor any thoughts to harm other     No psychosis    Self Rating Scales: (Such submitted for scanning) :     Quick Inventory of Depression (QID-Short Form):21  Zung Anxiety Index:78   Mood Disorder Questionnaire (MDQ): 8  The [employer]post Framework: a "bio-psycho-social" screening form for use as clinical raw data. / (submitted for scanning)      World Health Organization (W.H.O.) Adult ADHD Self Report Scale (ASRS-henrique 1.1):  5 of 6 core and 6 of 12 adjunctive    PAST PSYCHIATRIC HISTORY:     Inpatient: n  Outpatient: (incl. Primary Care): juliette Barber MD      Past Surgical History:   Procedure Laterality Date    APPENDECTOMY      COLONOSCOPY N/A 2/25/2019    Procedure: COLONOSCOPY;  Surgeon: Denis Eller MD;  Location: [facility];  Service: Endoscopy;  Laterality: N/A;    KNEE SURGERY      right    SHOULDER SURGERY      bilateral    SINUS SURGERY      TONSILLECTOMY      VASECTOMY        Other (medical) :    Head Injury: n  Seizure: cocaine use / in 20s; late 30's (couple mre)  Diabetes :n  HTN Yes   Other:     Substance Use:    Once start [employer] avoid cannabis    When revert would isolate ; did for release ; drug choice was cocaine    Alcohol: last 9-8 2020;   Start: 10y  regular use: 12y UNTIL decaon (1st period sober) when left start back drinking  At 12y: every weekend: beer 6 12 oa  16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 / 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: 21 days (out pocket private); none other    When went [place], self report drugs and alcohol; they said do not worry; when got back ; was sent psychiatrist pradip said lied on questionairre as he said not drinknig so terminated    2 and 1/2 yrs later: he contact [facility] ; says was wrongfully terminated as self report; go job back    Says for 1st 2 y back weekly drug test and random; and employer said go see psyc MD and go to counselor ; NOW just random.    Cannabis: 5th grade 1st  Try; 8th grade : at least 1x week; daily use: 16y until 18y then when came across    Tobacco:former ; stopped 2008 / start 21 yrs olf  Cocaine:years ago   Benzo: n  Opioid: n  Ecstasy:n  Other:n     Grandparent: Drug problems brother drug problem   Parents uncles / depression   Daughter depression    Mother and aunt history significant anxiety     3 wishes? :  Serenity, comfortable in skin , (20y) daughter not have seizures or migranes     PSYCHO-SOCIAL DEVELOPMENT HISTORY:     City Born: [facility]    Siblings (full or half)  Brothers: 1 older 2 1/2   Sisters: none    Parents : alive     Briefly Describe  your Mom: poor health controlling   Briefly Describe your Dad: poor health unhappy anxious    Bio Mom: Occupation:    Bio Dad:  Occupation: car sales     Marital Status:  / x2 to same lady (15 y / 1995); 2 1/2 y [place] ([place]) ; speak frequently ; she RN school     Children   Girls  (ages): 1 daughter 20 y: seizures migraines vertigo / depression anxiety / [employer] / 2nd yr journalism  Boys (ages):     Physical Abuse: N    Sexual abuse  N    Other trauma / abuse? 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 n

## 2023-01-23 NOTE — PATIENT INSTRUCTIONS
PLAN:     D Post Elisabeth SCHNEIDER informed pt that he is moving to [facility] [place] March 2023; he was given option remain Telly tirado provider pradip asks to remain in [place] [facility] [place] ; discussed such mutually agree ; told IF any issues to let me know pradip will flow request to herminiomeinig to make appt for him on [place] [facility] Schedule for [place] mid April     Informed pt that IF for some reason that no prescriber appt  in place by late Feb / (no later than March 1 2023) THEN pt needs to message via my chart to assit in getting on D Post MD glendy lackey     Follow up Chris ELLISW as arranged    Meds: see aftervisit summary / no change / meds reviewed as prior     References: (reviewed with pt as well):    Anxiety &  phobia workbook by JULIANO Espitia PhD  (web retailers: used: $ 7-10)    Relaxation stress reduction workbook: ALFA Lal PhD ( used: $7-10)    Feeling Good Website: Kyree Sweeney MD / [email or web address] website (free) PODCASTS    [place]: Path to Better Sleep : https://www.veterantraining.[place].gov/insomnia/ (free)     Pt expressed appreciation for the visit today and did not have further question at this time though pt  was still informed to:     Call  if problems.    Call / Report Side Effects to Elisabeth SCHNEIDER     Encouraged to follow up with primary care / Gen Med MD for continued monitoring of general health and wellness.    Understanding was expressed; and no further concerns nor questions were raised at this time.     remember healthy self care:   eat right  attempt adequate rest   HANDWASHING / encourage such paris. During this corona virus time   walk or light exercise within reason and as your general med team approves  read or explore any of reference materials / homework mentioned  reach out (I.e.,  connect with)  others who nuture and bring out best in you  avoid risky behaviors  keep your appointments  IF you  cannot make your appt THEN please call or go  online to reschedule.  avoid  alcohol and illicit substances.  Look for the positive.  All is often relative-seek balance  Call sooner if needed : [phone]   Call 911 or go to Emergency Room  (ER)  if any acute concerns

## 2023-03-15 NOTE — PATIENT INSTRUCTIONS
PLAN:     Follow up IN CLINIC D Post MD Oct 19 at 9am     offered social work though politely decline ; aware can re visit     Meds:Has enough other meds / tho stopping zoloft  / and write for remeron     References: (reviewed with pt as well):    Anxiety &  phobia workbook by JULIANO Espitia PhD  (web retailers: used: $ 7-10)    Relaxation stress reduction workbook: ALFA Lal PhD ( used: $7-10)    Feeling Good Website: Kyree Sweeney MD / [email or web address] website (free)     [place]: Path to Better Sleep : https://www.veterantraining.va.gov/insomnia/ (free)    Continue with AA and sponsor     Pt expressed appreciation for the visit today and did not have further question at this time though pt  was still informed to:     Call  if problems.    Call / Report Side Effects to Psyc MD     Encouraged to follow up with primary care / Gen Med MD for continued monitoring of general health and wellness.    Understanding was expressed; and no further concerns nor questions were raised at this time.     remember healthy self care:   eat right  attempt adequate rest   HANDWASHING / encourage such paris. During this corona virus time   walk or light exercise within reason and as your general med team approves  read or explore any of reference materials / homework mentioned  reach out (I.e.,  connect with)  others who nuture and bring out best in you  avoid risky behaviors  keep your appointments  IF you  cannot make your appt THEN please call or go online to reschedule.  avoid  alcohol and illicit substances.  Look for the positive.  All is often relative-seek balance  Call sooner if needed : [phone]   Call 911 or go to Emergency Room  (ER)  if any acute concerns       Head,  normocephalic,  atraumatic,  Face,  Face within normal limits,  Ears,  External ears within normal limits,  Nose/Nasopharynx,  External nose  normal appearance,  nares patent,

## 2023-04-18 ENCOUNTER — OFFICE VISIT (OUTPATIENT)
Dept: PSYCHIATRY | Facility: CLINIC | Age: 53
End: 2023-04-18
Payer: COMMERCIAL

## 2023-04-18 DIAGNOSIS — F31.9 BIPOLAR 1 DISORDER, DEPRESSED: Primary | ICD-10-CM

## 2023-04-18 DIAGNOSIS — F40.10 SOCIAL ANXIETY DISORDER: ICD-10-CM

## 2023-04-18 DIAGNOSIS — F17.220 NICOTINE DEPENDENCE, CHEWING TOBACCO, UNCOMPLICATED: ICD-10-CM

## 2023-04-18 DIAGNOSIS — F14.10 COCAINE ABUSE: ICD-10-CM

## 2023-04-18 DIAGNOSIS — G47.00 INSOMNIA, UNSPECIFIED TYPE: ICD-10-CM

## 2023-04-18 DIAGNOSIS — F10.11 ALCOHOL ABUSE, IN REMISSION: ICD-10-CM

## 2023-04-18 DIAGNOSIS — F90.2 ADHD (ATTENTION DEFICIT HYPERACTIVITY DISORDER), COMBINED TYPE: ICD-10-CM

## 2023-04-18 PROCEDURE — 99214 OFFICE O/P EST MOD 30 MIN: CPT | Mod: 95,,, | Performed by: PSYCHIATRY & NEUROLOGY

## 2023-04-18 PROCEDURE — 99214 PR OFFICE/OUTPT VISIT, EST, LEVL IV, 30-39 MIN: ICD-10-PCS | Mod: 95,,, | Performed by: PSYCHIATRY & NEUROLOGY

## 2023-04-18 RX ORDER — DEXTROAMPHETAMINE SACCHARATE, AMPHETAMINE ASPARTATE MONOHYDRATE, DEXTROAMPHETAMINE SULFATE AND AMPHETAMINE SULFATE 7.5; 7.5; 7.5; 7.5 MG/1; MG/1; MG/1; MG/1
60 CAPSULE, EXTENDED RELEASE ORAL EVERY MORNING
Qty: 60 CAPSULE | Refills: 0 | Status: SHIPPED | OUTPATIENT
Start: 2023-05-26 | End: 2023-06-25

## 2023-04-18 RX ORDER — ZOLPIDEM TARTRATE 10 MG/1
10 TABLET ORAL NIGHTLY PRN
Qty: 30 TABLET | Refills: 3 | Status: SHIPPED | OUTPATIENT
Start: 2023-04-28 | End: 2023-06-19 | Stop reason: SDUPTHER

## 2023-04-18 RX ORDER — LAMOTRIGINE 200 MG/1
200 TABLET ORAL DAILY
Qty: 30 TABLET | Refills: 3 | Status: SHIPPED | OUTPATIENT
Start: 2023-04-18 | End: 2023-06-19 | Stop reason: SDUPTHER

## 2023-04-18 RX ORDER — DEXTROAMPHETAMINE SACCHARATE, AMPHETAMINE ASPARTATE MONOHYDRATE, DEXTROAMPHETAMINE SULFATE AND AMPHETAMINE SULFATE 7.5; 7.5; 7.5; 7.5 MG/1; MG/1; MG/1; MG/1
60 CAPSULE, EXTENDED RELEASE ORAL EVERY MORNING
Qty: 60 CAPSULE | Refills: 0 | Status: SHIPPED | OUTPATIENT
Start: 2023-06-26 | End: 2023-06-19 | Stop reason: SDUPTHER

## 2023-04-18 RX ORDER — DEXTROAMPHETAMINE SACCHARATE, AMPHETAMINE ASPARTATE MONOHYDRATE, DEXTROAMPHETAMINE SULFATE AND AMPHETAMINE SULFATE 7.5; 7.5; 7.5; 7.5 MG/1; MG/1; MG/1; MG/1
60 CAPSULE, EXTENDED RELEASE ORAL EVERY MORNING
Qty: 60 CAPSULE | Refills: 0 | Status: SHIPPED | OUTPATIENT
Start: 2023-04-28 | End: 2023-05-28

## 2023-04-18 NOTE — PROGRESS NOTES
Andrei Casanova   1970 04/18/2023     Disclaimer: Evaluation and treatment is based on information presented to date. Any new information may affect assessment and findings.    The patient location is: Patient's home   and reported  that his home in [place], [place]    Visit type: Virtual visit with synchronous audio and video     Each patient to whom he or she provides medical services by telemedicine is: (1) informed of the relationship between the physician and patient and the respective role of any other health care provider with respect to management of the patient; and (2) notified that he or she may decline to receive medical services by telemedicine and may withdraw from such care at any time.    Patient was informed that I am a physician who is licensed in the [facility]:  Kyree Bhatt MD:  Employed by   [employer]     If technology issues arise: YUAN Bhatt MD will attempt to call pt back th pt also instructed that he/she may  call our office phone at: [phone]    Pt informed that if he / she is ever in crisis (or has acute concerns): pt is instructed to Dial 911 or go to nearest Emergency Room (ER)    Pt informed that if questions related to privacy practices: pt is instructed to contact [facility] Information Department: [phone]    Understanding Expressed. No questions.    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 ; history alcohol and substance use; reports speaks to [employer] daily ; reports substance and alcohol free    Who (in attendance) :  Pt himself     Note: asks for Adderall XR to go [facility] / re supply issues a nd other meds to remain [facility]    S: Patient's Own Perception of Condition (& Side Effects) : none ; says  no chest pain, no lightheadedness nor other reported difficulty ; says very productive ; no rash     O:     Continues working going   [employer] in [facility] area    Smiling / content  ;  "says 2023 that he was Promoted at work ;  has new duties as work shortages / reminded to be mindful to not put 'too much on his plate'  / understanding expressed ; says at present not an issue    Said (2023) that he was dating lady from [place] who attends his Faith ; he finds going to [facility] or [place]; Says 2023 he is now ENGAGED to her.    Has not been in to see Chris Alberto LCSW since Aug 2022; aware he can reach out if desires    Continues being sponsor for others in AA    Medication: no longer on neuroleptic ; off  For some time; he  Tried Abilify  10 lb (wt gain)    then D/C Saphris (muscle stiffness)  Zyprexa (sluggish and blah)    Remains Adderall XR 30 mg (TWO)  Daily ; remains on Lamictal ; no rash;  likes it     Continues with Lamictal (200mg) and Adderall XR 30  Mg (2 a day);  And Ambien 10 mg / likes his medication regimen; has done well on such    Reports remains 'clean / sober' says  speaks to sponsor everyday ; got 3 yr chip      Says work going fine;  Says looking at retiring around   to ; reports that he has received 3 raises in 12 months which favorably affects his long term as well    mother passed  ;  dad remains living in [place]; getting along ok    Has worked with  Social Work (Chris Alberto LCSW) : Includes in part Family of Origin issues and  BROTHER passed 2020 ALSO of Covid. MOM PASSED AWAY of [facility]  as [place] for his mother's  even during covid ; says he will get back in with Chris Alberto LCSW and knows how to schedule himself     Volunteering at [employer] (Andrei Briones counselor) ; he liked such ; goes to [facility]; seems to take pride / value in participating there; "helping others" every Monday night he goes help out    Safety:no SI / no HI     Supports: enjoys his daughter ; she near graduation in  [employer] recently; (he is concerned about her seizure history)     Work / School " "    Activities tends like more solitarty activities / notes:  some social avoidant    Staying "Sober / "clean"  (I.e., Substance issue): says has sponsor / and participates some in AA    Constitutional Health Concerns:       Review of Systems   HENT: Negative.     Eyes: Negative.    Respiratory: Negative.     Cardiovascular: Negative.    Gastrointestinal: Negative.    Musculoskeletal: Negative.    Skin: Negative.    Neurological: Negative.    Endo/Heme/Allergies: Negative.     Musculoskeletal: no tremor no stiffness       Patient Active Problem List   Diagnosis    ADD (attention deficit disorder)    Bipolar affective    HTN (hypertension)    Alcohol abuse, in remission    Chews tobacco regularly    Mixed hyperlipidemia    Colon cancer screening    Special screening for malignant neoplasms, colon    Family history of colonic polyps    Bipolar 1 disorder, depressed    Social anxiety disorder    ADHD (attention deficit hyperactivity disorder), combined type    Cocaine abuse; full remission x years    Nicotine dependence, chewing tobacco, uncomplicated    Insomnia    Grief: Mom  Covid Dec 2020    Erythrocytosis    Prostatitis          Current Outpatient Medications:     amLODIPine (NORVASC) 5 MG tablet, TAKE 1 TABLET BY MOUTH DAILY, Disp: 10 tablet, Rfl: 0    [START ON 2023] dextroamphetamine-amphetamine (ADDERALL XR) 30 MG 24 hr capsule, Take 2 capsules (60 mg total) by mouth every morning., Disp: 60 capsule, Rfl: 0    [START ON 2023] dextroamphetamine-amphetamine (ADDERALL XR) 30 MG 24 hr capsule, Take 2 capsules (60 mg total) by mouth every morning., Disp: 60 capsule, Rfl: 0    [START ON 2023] dextroamphetamine-amphetamine (ADDERALL XR) 30 MG 24 hr capsule, Take 2 capsules (60 mg total) by mouth every morning., Disp: 60 capsule, Rfl: 0    lamoTRIgine (LAMICTAL) 200 MG tablet, Take 1 tablet (200 mg total) by mouth once daily. STOP all Lamictal IF any RASH and tell Psmeche SCHNEIDER, Disp: 30 tablet, Rfl: 3    " lisinopriL 10 MG tablet, TAKE 1 TABLET BY MOUTH DAILY, Disp: 10 tablet, Rfl: 0    [START ON 2023] zolpidem (AMBIEN) 10 mg Tab, Take 1 tablet (10 mg total) by mouth nightly as needed (INSOMNIA)., Disp: 30 tablet, Rfl: 3     Social History     Tobacco Use   Smoking Status Former    Packs/day: 1.00    Years: 15.00    Pack years: 15.00    Types: Cigarettes    Quit date: 2009    Years since quittin.1   Smokeless Tobacco Never        Review of patient's allergies indicates:   Allergen Reactions    Abilify [aripiprazole] Other (See Comments)     Weight gain 10 lbs    Remeron [mirtazapine] Other (See Comments)     Increased hunger and felt disoriented    Saphris [asenapine] Other (See Comments)     Muscle stiffness    Zoloft [sertraline] Other (See Comments)     Sex dysfunction (anorgasmia)    Zyprexa [olanzapine] Other (See Comments)     sluggish and emotionally flat      Impulse Control: no history SI / nor HI    Mental Status Exam:   Appearance: casual   Oriented: x 3  Attitude: cooperative   Eye Contact: good   Behavior: calm / smiling at times   Mood: doing ok  Cognition: alert   Concentration: grossly intact   Affect: appropriate range   Anxiety: very mild  Thought Process: goal directed   Speech: Volume : WNL   Quantity WNL   Quality: appears to openly answer questions   Eye Contact: good   Threats: no SI / no HI   Psychosis: denies all   Estimate of Intellectual Function: above average     ASSESSMENT:   Encounter Diagnoses   Name Primary?    Bipolar 1 disorder, depressed (2023: doing well on current med regimen (incl Lamictal 200 mg daily) Yes    Insomnia, unspecified type     Nicotine dependence, chewing tobacco, uncomplicated     Social anxiety disorder     ADHD (attention deficit hyperactivity disorder), combined type     Cocaine abuse; full REMISSION x years     Alcohol abuse, in FULL REMISSION sober 1 year 2020; in past /5 th a day             Patient Instructions     PLAN:     Follow  up     7/3/2023 10:00 AM ESTABLISHED PATIENT - VIRTUAL [facility] - Psych [address] Kyree Bhatt MD     Meds: see aftervisit summary / no change / 3 Rx  adderall XR 30 mg (two capsules daily) / starting 4-28-23 / sent to [facility]; [facility] to [facility]    References: (reviewed with pt as well):    Anxiety &  phobia workbook by JULIANO Espitia PhD  (web retailers: used: $ 7-10)    Relaxation stress reduction workbook: ALFA Lal PhD ( used: $7-10)    Feeling Good Website: Kyree Sweeney MD / www.feelinggood.com website (free) PODCASTS    [place]: Path to Better Sleep : https://www.veterantraining.va.gov/insomnia/ (free)     Pt expressed appreciation for the visit today and did not have further question at this time though pt  was still informed to:     Call  if problems.    Call / Report Side Effects to Elisabeth SCHNEIDER     Encouraged to follow up with primary care / Gen Med MD for continued monitoring of general health and wellness.    Understanding was expressed; and no further concerns nor questions were raised at this time.     remember healthy self care:   eat right  attempt adequate rest   HANDWASHING / encourage such paris. During this corona virus time   walk or light exercise within reason and as your general med team approves  read or explore any of reference materials / homework mentioned  reach out (I.e.,  connect with)  others who nuture and bring out best in you  avoid risky behaviors  keep your appointments  IF you  cannot make your appt THEN please call or go online to reschedule.  avoid  alcohol and illicit substances.  Look for the positive.  All is often relative-seek balance  Call sooner if needed : [phone]   Call 911 or go to Emergency Room  (ER)  if any acute concerns      >>BACKGROUND from admit elisabeth Eval Sept 2020 <<     Andrei collazo 52 y.o. Bipolar (depressed)  male presents today as [facility] by [facility]    Was seeing Carlos Barber MD for 8-10 yrs;he  "retired. 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 Dr Javed has continued on meds as from Dr Segovia    Pt has been maintained on:  on lamictal 200 mg (no rash)   adderall XR 30   ambien  5mg    Sober x 1 yr as of 9- via AA; prior to that 9 and 1/2 yr after relapsed    Says has sponsor    Says 'to be up front have had substance issues: alcohol and cocaine     Social anxiety ' big struggle for long time' ; more recently though time leave house; always struggle; people who not know think I outgoing; tho says push through it; makes feel present someone I am not    prior Juliette SCHNEIDER: tried paxil zoloft tho no clear recall of how did: interest to try /    Never abilify jack    Says he did Tried effexor XR (75 mg) difficult sex side effect;  Denies any suicidal ideation / nor gesture / nor any thoughts to harm other     No psychosis    Self Rating Scales: (Such submitted for scanning) :     Quick Inventory of " "Depression (QID-Short Form):21  Zung Anxiety Index:78   Mood Disorder Questionnaire (MDQ): 8  The [place] Framework: a "bio-psycho-social" screening form for use as clinical raw data. / (submitted for scanning)      World Health Organization (W.H.O.) Adult ADHD Self Report Scale (ASRS-henrique 1.1):  5 of 6 core and 6 of 12 adjunctive    PAST PSYCHIATRIC HISTORY:     Inpatient: n  Outpatient: (incl. Primary Care): bradley Barber MD      Past Surgical History:   Procedure Laterality Date    APPENDECTOMY      COLONOSCOPY N/A 2/25/2019    Procedure: COLONOSCOPY;  Surgeon: Denis Eller MD;  Location: [facility];  Service: Endoscopy;  Laterality: N/A;    KNEE SURGERY      right    SHOULDER SURGERY      bilateral    SINUS SURGERY      TONSILLECTOMY      VASECTOMY        Other (medical) :    Head Injury: n  Seizure: cocaine use / in 20s; late 30's (couple mre)  Diabetes :n  HTN Yes   Other:     Substance Use:    Once start [employer] avoid cannabis    When revert would isolate ; did for release ; drug choice was cocaine    Alcohol: last 9-8 2020;   Start: 10y  regular use: 12y UNTIL decaon (1st period sober) when left start back drinking  At 12y: every weekend: beer 6 12 oa  16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 / 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 1 month 2008 ; 2 months prior: in [place]: 21 days (out pocket private); none other    When went [place], self report drugs and alcohol; they said do not worry; when got back ; was sent psychiatrist tho said lied on questionairre as he said not drinknig so terminated    2 " and 1/2 yrs later: he contact [facility] ; says was wrongfully terminated as self report; go job back    Says for 1st 2 y back weekly drug test and random; and employer said go see psmeche SCHNEIDER and go to counselor ; NOW just random.    Cannabis: 5th grade 1st  Try; 8th grade : at least 1x week; daily use: 16y until 18y then when came across    Tobacco:former ; stopped 2008 / start 21 yrs olf  Cocaine:years ago   Benzo: n  Opioid: n  Ecstasy:n  Other:n     Grandparent: Drug problems brother drug problem   Parents uncles / depression   Daughter depression    Mother and aunt history significant anxiety     3 wishes? :  Serenity, comfortable in skin , (20y) daughter not have seizures or migranes     PSYCHO-SOCIAL DEVELOPMENT HISTORY:     City Born: [facility]    Siblings (full or half)  Brothers: 1 older 2 1/2   Sisters: none    Parents : alive     Briefly Describe  your Mom: poor health controlling   Briefly Describe your Dad: poor health unhappy anxious    Bio Mom: Occupation:    Bio Dad:  Occupation: car sales     Marital Status:  / x2 to same lady (15 y / 1995); 2 1/2 y [place] ([place]) ; speak frequently ; she RN school     Children   Girls  (ages): 1 daughter 20 y: seizures migraines vertigo / depression anxiety / [employer] / 2nd yr journalism  Boys (ages):     Physical Abuse: N    Sexual abuse  N    Other trauma / abuse? 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 n

## 2023-04-18 NOTE — PATIENT INSTRUCTIONS
PLAN:     Follow up     7/3/2023 10:00 AM ESTABLISHED PATIENT - VIRTUAL [facility] - Psych [address] Kyree Bhatt MD     Meds: see aftervisit summary / no change / 3 Rx  adderall XR 30 mg (two capsules daily) / starting 4-28-23 / sent to [facility]; other meds to [facility]    References: (reviewed with pt as well):    Anxiety &  phobia workbook by JULIANO Espitia PhD  (web retailers: used: $ 7-10)    Relaxation stress reduction workbook: ALFA Lal PhD ( used: $7-10)    Feeling Good Website: Kyree Sweeney MD / www.feelinggood.com website (free) PODCASTS    [place]: Path to Better Sleep : https://www.veterantraining.[place].gov/insomnia/ (free)     Pt expressed appreciation for the visit today and did not have further question at this time though pt  was still informed to:     Call  if problems.    Call / Report Side Effects to Psyc MD     Encouraged to follow up with primary care / Gen Med MD for continued monitoring of general health and wellness.    Understanding was expressed; and no further concerns nor questions were raised at this time.     remember healthy self care:   eat right  attempt adequate rest   HANDWASHING / encourage such paris. During this corona virus time   walk or light exercise within reason and as your general med team approves  read or explore any of reference materials / homework mentioned  reach out (I.e.,  connect with)  others who nuture and bring out best in you  avoid risky behaviors  keep your appointments  IF you  cannot make your appt THEN please call or go online to reschedule.  avoid  alcohol and illicit substances.  Look for the positive.  All is often relative-seek balance  Call sooner if needed : [phone]   Call 911 or go to Emergency Room  (ER)  if any acute concerns

## 2023-06-19 ENCOUNTER — PATIENT MESSAGE (OUTPATIENT)
Dept: PSYCHIATRY | Facility: CLINIC | Age: 53
End: 2023-06-19

## 2023-06-19 ENCOUNTER — OFFICE VISIT (OUTPATIENT)
Dept: PSYCHIATRY | Facility: CLINIC | Age: 53
End: 2023-06-19
Payer: COMMERCIAL

## 2023-06-19 DIAGNOSIS — F17.220 NICOTINE DEPENDENCE, CHEWING TOBACCO, UNCOMPLICATED: ICD-10-CM

## 2023-06-19 DIAGNOSIS — F40.10 SOCIAL ANXIETY DISORDER: ICD-10-CM

## 2023-06-19 DIAGNOSIS — N41.9 PROSTATITIS, UNSPECIFIED PROSTATITIS TYPE: ICD-10-CM

## 2023-06-19 DIAGNOSIS — G47.00 INSOMNIA, UNSPECIFIED TYPE: ICD-10-CM

## 2023-06-19 DIAGNOSIS — F10.11 ALCOHOL ABUSE, IN REMISSION: ICD-10-CM

## 2023-06-19 DIAGNOSIS — F31.62 MODERATE MIXED BIPOLAR I DISORDER: ICD-10-CM

## 2023-06-19 DIAGNOSIS — F90.2 ADHD (ATTENTION DEFICIT HYPERACTIVITY DISORDER), COMBINED TYPE: ICD-10-CM

## 2023-06-19 DIAGNOSIS — F14.10 COCAINE ABUSE: ICD-10-CM

## 2023-06-19 DIAGNOSIS — F31.9 BIPOLAR 1 DISORDER, DEPRESSED: Primary | ICD-10-CM

## 2023-06-19 PROCEDURE — 99214 OFFICE O/P EST MOD 30 MIN: CPT | Mod: 95,,, | Performed by: PSYCHIATRY & NEUROLOGY

## 2023-06-19 PROCEDURE — 99214 PR OFFICE/OUTPT VISIT, EST, LEVL IV, 30-39 MIN: ICD-10-PCS | Mod: 95,,, | Performed by: PSYCHIATRY & NEUROLOGY

## 2023-06-19 RX ORDER — ZOLPIDEM TARTRATE 10 MG/1
10 TABLET ORAL NIGHTLY PRN
Qty: 30 TABLET | Refills: 3 | Status: SHIPPED | OUTPATIENT
Start: 2023-06-23 | End: 2023-09-14 | Stop reason: SDUPTHER

## 2023-06-19 RX ORDER — DEXTROAMPHETAMINE SACCHARATE, AMPHETAMINE ASPARTATE MONOHYDRATE, DEXTROAMPHETAMINE SULFATE AND AMPHETAMINE SULFATE 7.5; 7.5; 7.5; 7.5 MG/1; MG/1; MG/1; MG/1
60 CAPSULE, EXTENDED RELEASE ORAL EVERY MORNING
Qty: 60 CAPSULE | Refills: 0 | Status: SHIPPED | OUTPATIENT
Start: 2023-08-25 | End: 2023-09-14

## 2023-06-19 RX ORDER — DEXTROAMPHETAMINE SACCHARATE, AMPHETAMINE ASPARTATE MONOHYDRATE, DEXTROAMPHETAMINE SULFATE AND AMPHETAMINE SULFATE 7.5; 7.5; 7.5; 7.5 MG/1; MG/1; MG/1; MG/1
60 CAPSULE, EXTENDED RELEASE ORAL EVERY MORNING
Qty: 60 CAPSULE | Refills: 0 | Status: SHIPPED | OUTPATIENT
Start: 2023-07-26 | End: 2023-08-31

## 2023-06-19 RX ORDER — DEXTROAMPHETAMINE SACCHARATE, AMPHETAMINE ASPARTATE MONOHYDRATE, DEXTROAMPHETAMINE SULFATE AND AMPHETAMINE SULFATE 7.5; 7.5; 7.5; 7.5 MG/1; MG/1; MG/1; MG/1
60 CAPSULE, EXTENDED RELEASE ORAL EVERY MORNING
Qty: 60 CAPSULE | Refills: 0 | Status: SHIPPED | OUTPATIENT
Start: 2023-06-26 | End: 2023-07-27

## 2023-06-19 RX ORDER — LAMOTRIGINE 200 MG/1
200 TABLET ORAL DAILY
Qty: 30 TABLET | Refills: 3 | Status: SHIPPED | OUTPATIENT
Start: 2023-06-19 | End: 2023-09-14 | Stop reason: SDUPTHER

## 2023-06-19 NOTE — PROGRESS NOTES
Andrei Casanova   1970 06/19/2023     Disclaimer: Evaluation and treatment is based on information presented to date. Any new information may affect assessment and findings.    The patient location is: Patient's home   and reported  that his home in [place], [place]    Visit type: Virtual visit with synchronous audio and video     Each patient to whom he or she provides medical services by telemedicine is: (1) informed of the relationship between the physician and patient and the respective role of any other health care provider with respect to management of the patient; and (2) notified that he or she may decline to receive medical services by telemedicine and may withdraw from such care at any time.    Patient was informed that I am a physician who is licensed in the [facility]:  Kyree Bhatt MD:  Employed by   [facility] [employer]     If technology issues arise: YUAN Bhatt MD will attempt to call pt back th pt also instructed that he/she may  call our office phone at: [phone]    Pt informed that if he / she is ever in crisis (or has acute concerns): pt is instructed to Dial 911 or go to nearest Emergency Room (ER)    Pt informed that if questions related to privacy practices: pt is instructed to contact [facility] Information Department: [phone]    Understanding Expressed. No questions.    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 ; history alcohol and substance use; reports speaks to [employer] daily ; reports substance and alcohol free    Who (in attendance) :  Pt himself     Note: asks for Adderall XR to go to [facility] [place] / re supply issues a nd other meds to remain [facility]    S: Patient's Own Perception of Condition (& Side Effects) : none ; says  no chest pain, no lightheadedness nor other reported difficulty ; says very productive ; no rash     O:     Continues working going   [employer] in [facility] area   2or 3 x week and 1  where bring mtg     Smiling / content  ; says 2023 that he was Promoted at work ;  has new duties as work shortages / reminded to be mindful to not put 'too much on his plate'  / understanding expressed ; says at present not an issue    Said (2023) that he was dating lady from [place] who attends his Orthodoxy ; he finds going to [facility] or [place]; as of  2023 ENGAGED ; to be  2023     Has not been in to see Chris Alberto LCSW since Aug 2022; aware he can reach out if desires    Continues being sponsor for others in AA / says sponsors 4 people tho 2 are rather active    2023 confirms He himself attends AA 2-3 time a weeks    Medication: no longer on neuroleptic ; off  For some time; he  Tried Abilify  10 lb (wt gain)    then D/C Saphris (muscle stiffness)  Zyprexa (sluggish and blah)    Remains Adderall XR 30 mg (TWO)  Daily ; remains on Lamictal ; no rash;  likes it     Continues with Lamictal (200mg) and Adderall XR 30  Mg (2 a day);  And Ambien 10 mg / likes his medication regimen; has done well on such    Reports remains 'clean / sober' says  speaks to sponsor everyday ; got 3 yr chip      Says work going fine;  Says looking at retiring around   to ; reports that he has received 3 raises in 12 months which favorably affects his longterm as well    mother passed  ;  dad remains living in [place]; getting along ok    Has worked with  Social Work (Chris Alberto LCSW) : Includes in part Family of Origin issues and  BROTHER passed 2020 ALSO of Covid. MOM PASSED AWAY of [employer]  as [place] for his mother's  even during covid     Volunteering at [employer] (Andrei Briones counselor) ; he liked such ; goes to [facility]; seems to take pride / value in participating there;     Safety:no SI / no HI     Supports: enjoys his daughter ; she near graduation in  [employer] recently; (he is concerned about her seizure  "history)     Work / School     Activities tends like more solitarty activities / notes:  some social avoidant    Staying "Sober / "clean"  (I.e., Substance issue): says has sponsor / and participates in AA 2-3 x a week    Constitutional Health Concerns:       Review of Systems   HENT: Negative.     Eyes: Negative.    Respiratory: Negative.     Cardiovascular: Negative.    Gastrointestinal: Negative.    Musculoskeletal: Negative.    Skin: Negative.    Neurological: Negative.    Endo/Heme/Allergies: Negative.     Musculoskeletal: no tremor no stiffness       Patient Active Problem List   Diagnosis    ADD (attention deficit disorder)    Bipolar affective    HTN (hypertension)    Alcohol abuse, in remission    Chews tobacco regularly    Mixed hyperlipidemia    Colon cancer screening    Special screening for malignant neoplasms, colon    Family history of colonic polyps    Bipolar 1 disorder, depressed    Social anxiety disorder    ADHD (attention deficit hyperactivity disorder), combined type    Cocaine abuse; full remission x years    Nicotine dependence, chewing tobacco, uncomplicated    Insomnia    Grief: Mom  Covid Dec 2020    Erythrocytosis    Prostatitis          Current Outpatient Medications:     amLODIPine (NORVASC) 5 MG tablet, TAKE 1 TABLET BY MOUTH DAILY, Disp: 10 tablet, Rfl: 0    dextroamphetamine-amphetamine (ADDERALL XR) 30 MG 24 hr capsule, Take 2 capsules (60 mg total) by mouth every morning., Disp: 60 capsule, Rfl: 0    [START ON 2023] dextroamphetamine-amphetamine (ADDERALL XR) 30 MG 24 hr capsule, Take 2 capsules (60 mg total) by mouth every morning., Disp: 60 capsule, Rfl: 0    [START ON 2023] dextroamphetamine-amphetamine (ADDERALL XR) 30 MG 24 hr capsule, Take 2 capsules (60 mg total) by mouth every morning., Disp: 60 capsule, Rfl: 0    [START ON 2023] dextroamphetamine-amphetamine (ADDERALL XR) 30 MG 24 hr capsule, Take 2 capsules (60 mg total) by mouth every morning., Disp: " 60 capsule, Rfl: 0    lamoTRIgine (LAMICTAL) 200 MG tablet, Take 1 tablet (200 mg total) by mouth once daily. STOP all Lamictal IF any RASH and tell Psyc MD, Disp: 30 tablet, Rfl: 3    lisinopriL 10 MG tablet, TAKE 1 TABLET BY MOUTH DAILY, Disp: 10 tablet, Rfl: 0    [START ON 2023] zolpidem (AMBIEN) 10 mg Tab, Take 1 tablet (10 mg total) by mouth nightly as needed (INSOMNIA)., Disp: 30 tablet, Rfl: 3     Social History     Tobacco Use   Smoking Status Former    Packs/day: 1.00    Years: 15.00    Pack years: 15.00    Types: Cigarettes    Quit date: 2009    Years since quittin.3   Smokeless Tobacco Never        Review of patient's allergies indicates:   Allergen Reactions    Abilify [aripiprazole] Other (See Comments)     Weight gain 10 lbs    Remeron [mirtazapine] Other (See Comments)     Increased hunger and felt disoriented    Saphris [asenapine] Other (See Comments)     Muscle stiffness    Zoloft [sertraline] Other (See Comments)     Sex dysfunction (anorgasmia)    Zyprexa [olanzapine] Other (See Comments)     sluggish and emotionally flat      Impulse Control: no history SI / nor HI    Mental Status Exam:   Appearance: casual   Oriented: x 3  Attitude: cooperative   Eye Contact: good   Behavior: calm / smiling at times   Mood: doing ok  Cognition: alert   Concentration: grossly intact   Affect: appropriate range   Anxiety: very mild  Thought Process: goal directed   Speech: Volume : WNL   Quantity WNL   Quality: appears to openly answer questions   Eye Contact: good   Threats: no SI / no HI   Psychosis: denies all   Estimate of Intellectual Function: above average     ASSESSMENT:   Encounter Diagnoses   Name Primary?    Bipolar 1 disorder, depressed (2023: doing well on current med regimen (incl Lamictal 200 mg daily) Yes    Social anxiety disorder     Alcohol abuse, in FULL REMISSION sober 1 year 2020; in past 1/5 th a day      Prostatitis, unspecified (reported such at 2022 session  "; seeing urology)     Insomnia, unspecified type     Nicotine dependence, chewing tobacco, uncomplicated     Cocaine abuse; full REMISSION x years     ADHD (attention deficit hyperactivity disorder), combined type     Moderate mixed bipolar I disorder        Patient Instructions     PLAN:     Elisabeth SCHNEIDER flowed request to scheduling for Follow UP Sept 11 2023 @8:30a TELEHEALTH  TH PATIENT  NEEDS TO CONFIRM APPOINTMENT  by seeing such appointment  appear on their "My Chart" peggy.      NOTE:  IF  appointment is not visible, THEN patient is  instructed to call [facility] ([facility]) at:  [phone]  and coordinate appointment scheduling with . Understanding Expressed.      Meds: see aftervisit summary / no change / 3 Rx  adderall XR 30 mg (two capsules daily: 60 mg total dose)/ sent to [facility]; other meds (Lamictal 200 mg #30 x3 and ambien  10 mg #30x 3 to [facility]    Continues attending AA as doing ; if need arises to return to counseling [phone] / (note: previously saw Chris Alberto [facility])    References: (reviewed with pt as well):    Anxiety &  phobia workbook by JULIANO Espitia PhD  (web retailers: used: $ 7-10)    Relaxation stress reduction workbook: ALFA Lal PhD ( used: $7-10)    Feeling Good Website: Kyree Sweeney MD / www.feelinggoTotally Interactive [email or web address] website (free) PODCASTS    [place]: Path to Better Sleep : https://www.veterantraining.va.gov/insomnia/ (free)     Pt expressed appreciation for the visit today and did not have further question at this time though pt  was still informed to:     Call  if problems.    Call / Report Side Effects to Elisabeth SCHNEIDER     Encouraged to follow up with primary care / Gen Med MD for continued monitoring of general health and wellness.    Understanding was expressed; and no further concerns nor questions were raised at this time.     remember healthy self care:   eat right  attempt adequate rest   HANDWASHING / encourage such paris. During " "this corona virus time   walk or light exercise within reason and as your general med team approves  read or explore any of reference materials / homework mentioned  reach out (I.e.,  connect with)  others who nuture and bring out best in you  avoid risky behaviors  keep your appointments  IF you  cannot make your appt THEN please call or go online to reschedule.  avoid  alcohol and illicit substances.  Look for the positive.  All is often relative-seek balance  Call sooner if needed : [phone]   Call 911 or go to Emergency Room  (ER)  if any acute concerns      >>BACKGROUND from admit psmeche Eval Sept 2020 <<     nAdrei collazo 53 y.o. Bipolar (depressed)  male presents today as [facility]    Was seeing Carlos Barber MD for 8-10 yrs;he retired. 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 Dr Javed has continued on meds as from Dr Segovia    Pt has been maintained on:  on lamictal 200 " "mg (no rash)   adderall XR 30   ambien  5mg    Sober x 1 yr as of 9- via AA; prior to that 9 and 1/2 yr after relapsed    Says has sponsor    Says 'to be up front have had substance issues: alcohol and cocaine     Social anxiety ' big struggle for long time' ; more recently though time leave house; always struggle; people who not know think I outgoing; tho says push through it; makes feel present someone I am not    prior Juliette SCHNEIDER: tried paxil zoloft tho no clear recall of how did: interest to try /    Never abilify jack    Says he did Tried effexor XR (75 mg) difficult sex side effect;  Denies any suicidal ideation / nor gesture / nor any thoughts to harm other     No psychosis    Self Rating Scales: (Such submitted for scanning) :     Quick Inventory of Depression (QID-Short Form):21  Zung Anxiety Index:78   Mood Disorder Questionnaire (MDQ): 8  The Milepost Framework: a "bio-psycho-social" screening form for use as clinical raw data. / (submitted for scanning)      World Health Organization (W.H.O.) Adult ADHD Self Report Scale (ASRS-henrique 1.1):  5 of 6 core and 6 of 12 adjunctive    PAST PSYCHIATRIC HISTORY:     Inpatient: n  Outpatient: (incl. Primary Care): juliette Barber MD      Past Surgical History:   Procedure Laterality Date    APPENDECTOMY      COLONOSCOPY N/A 2/25/2019    Procedure: COLONOSCOPY;  Surgeon: Denis Eller MD;  Location: [facility];  Service: Endoscopy;  Laterality: N/A;    KNEE SURGERY      right    SHOULDER SURGERY      bilateral    SINUS SURGERY      TONSILLECTOMY      VASECTOMY        Other (medical) :    Head Injury: n  Seizure: cocaine use / in 20s; late 30's (couple mre)  Diabetes :n  HTN Yes   Other:     Substance Use:    Once start [employer] avoid cannabis    When revert would isolate ; did for release ; drug choice was cocaine    Alcohol: last 9-8 2020;   Start: 10y  regular use: 12y UNTIL decaon (1st period sober) when left start back drinking  At 12y: every " "weekend: beer 6 12 oa  16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 / 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 1 month 2008 ; 2 months prior: in [place]: 21 days (out pocket private); none other    When went [place], self report drugs and alcohol; they said do not worry; when got back ; was sent psychiatrist tho said lied on questionairre as he said not drinknig so terminated    2 and 1/2 yrs later: he contact [facility] ; says was wrongfully terminated as self report; go job back    Says for 1st 2 y back weekly drug test and random; and employer said go see psyc MD and go to counselor ; NOW just random.    Cannabis: 5th grade 1st  Try; 8th grade : at least 1x week; daily use: 16y until 18y then when came across    Tobacco:former ; stopped 2008 / start 21 yrs olf  Cocaine:years ago   Benzo: n  Opioid: n  Ecstasy:n  Other:n     Grandparent: Drug problems brother drug problem   Parents uncles / depression   Daughter depression    Mother and aunt history significant anxiety     3 wishes? :  Serenity, comfortable in skin , (20y) daughter not have seizures or migranes     PSYCHO-SOCIAL DEVELOPMENT HISTORY:     City Born: [facility]    Siblings (full or half)  Brothers: 1 older 2 1/2   Sisters: none    Parents : alive     Briefly Describe  your Mom: poor health controlling   Briefly Describe your Dad: poor health unhappy anxious    Bio Mom: Occupation:    Bio Dad:  Occupation: car sales     Marital Status:  / x2 to same lady (15 y / 1995); 2 1/2 y [place] ([place]) ; speak frequently ; she RN school "     Children   Girls  (ages): 1 daughter 20 y: seizures migraines vertigo / depression anxiety / [employer] / 2nd yr journalism  Boys (ages):     Physical Abuse: N    Sexual abuse  N    Other trauma / abuse? 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 n

## 2023-06-19 NOTE — PATIENT INSTRUCTIONS
"  PLAN:     Elisabeth SCHNEIDER flowed request to scheduling for Follow UP Sept 11 2023 @8:30a TELEHEALTH  THO PATIENT  NEEDS TO CONFIRM APPOINTMENT  by seeing such appointment  appear on their "My Chart" peggy.      NOTE:  IF  appointment is not visible, THEN patient is  instructed to call [facility] ([facility]) at:  [phone]  and coordinate appointment scheduling with . Understanding Expressed.      Meds: see aftervisit summary / no change / 3 Rx  adderall XR 30 mg (two capsules daily: 60 mg total dose)/ sent to [facility]; other meds (Lamictal 200 mg #30 x3 and ambien  10 mg #30x 3 to [facility]    Continues attending AA as doing ; if need arises to return to counseling [phone] / (note: previously saw Chris Alberto LCSW)    References: (reviewed with pt as well):    Anxiety &  phobia workbook by JULIANO Espitia PhD  (web retailers: used: $ 7-10)    Relaxation stress reduction workbook: LAFA Lal PhD ( used: $7-10)    Feeling Good Website: Kyree Sweeney MD / www.feelinggo[place] [employer] [email or web address] website (free) PODCASTS    [place]: Path to Better Sleep : https://www.veterantraining.[place].gov/insomnia/ (free)     Pt expressed appreciation for the visit today and did not have further question at this time though pt  was still informed to:     Call  if problems.    Call / Report Side Effects to Elisabeth SCHNEIDER     Encouraged to follow up with primary care / Gen Med MD for continued monitoring of general health and wellness.    Understanding was expressed; and no further concerns nor questions were raised at this time.     remember healthy self care:   eat right  attempt adequate rest   HANDWASHING / encourage such paris. During this corona virus time   walk or light exercise within reason and as your general med team approves  read or explore any of reference materials / homework mentioned  reach out (I.e.,  connect with)  others who nuture and bring out best in you  avoid risky behaviors  keep your " appointments  IF you  cannot make your appt THEN please call or go online to reschedule.  avoid  alcohol and illicit substances.  Look for the positive.  All is often relative-seek balance  Call sooner if needed : [phone]   Call 911 or go to Emergency Room  (ER)  if any acute concerns

## 2023-08-02 ENCOUNTER — TELEPHONE (OUTPATIENT)
Dept: PHARMACY | Facility: CLINIC | Age: 53
End: 2023-08-02
Payer: COMMERCIAL

## 2023-09-14 ENCOUNTER — OFFICE VISIT (OUTPATIENT)
Dept: PSYCHIATRY | Facility: CLINIC | Age: 53
End: 2023-09-14
Payer: COMMERCIAL

## 2023-09-14 DIAGNOSIS — R41.840 IMPAIRED CONCENTRATION: ICD-10-CM

## 2023-09-14 DIAGNOSIS — Z63.9 FAMILY DYNAMICS PROBLEM: ICD-10-CM

## 2023-09-14 DIAGNOSIS — F17.220 NICOTINE DEPENDENCE, CHEWING TOBACCO, UNCOMPLICATED: ICD-10-CM

## 2023-09-14 DIAGNOSIS — F40.10 SOCIAL ANXIETY DISORDER: ICD-10-CM

## 2023-09-14 DIAGNOSIS — F14.10 COCAINE ABUSE: ICD-10-CM

## 2023-09-14 DIAGNOSIS — G47.00 INSOMNIA, UNSPECIFIED TYPE: ICD-10-CM

## 2023-09-14 DIAGNOSIS — F90.2 ADHD (ATTENTION DEFICIT HYPERACTIVITY DISORDER), COMBINED TYPE: ICD-10-CM

## 2023-09-14 DIAGNOSIS — R25.1 TREMOR: ICD-10-CM

## 2023-09-14 DIAGNOSIS — F10.11 ALCOHOL ABUSE, IN REMISSION: ICD-10-CM

## 2023-09-14 DIAGNOSIS — F31.9 BIPOLAR 1 DISORDER, DEPRESSED: Primary | ICD-10-CM

## 2023-09-14 PROCEDURE — 99215 OFFICE O/P EST HI 40 MIN: CPT | Mod: 95,,, | Performed by: PSYCHIATRY & NEUROLOGY

## 2023-09-14 PROCEDURE — 99215 PR OFFICE/OUTPT VISIT, EST, LEVL V, 40-54 MIN: ICD-10-PCS | Mod: 95,,, | Performed by: PSYCHIATRY & NEUROLOGY

## 2023-09-14 RX ORDER — DEXTROAMPHETAMINE SACCHARATE, AMPHETAMINE ASPARTATE, DEXTROAMPHETAMINE SULFATE AND AMPHETAMINE SULFATE 3.75; 3.75; 3.75; 3.75 MG/1; MG/1; MG/1; MG/1
15 TABLET ORAL 2 TIMES DAILY
Qty: 60 TABLET | Refills: 0 | Status: SHIPPED | OUTPATIENT
Start: 2023-12-06 | End: 2023-10-25 | Stop reason: SDUPTHER

## 2023-09-14 RX ORDER — DEXTROAMPHETAMINE SACCHARATE, AMPHETAMINE ASPARTATE, DEXTROAMPHETAMINE SULFATE AND AMPHETAMINE SULFATE 3.75; 3.75; 3.75; 3.75 MG/1; MG/1; MG/1; MG/1
15 TABLET ORAL 2 TIMES DAILY
Qty: 60 TABLET | Refills: 0 | Status: SHIPPED | OUTPATIENT
Start: 2023-11-06 | End: 2023-12-06

## 2023-09-14 RX ORDER — DEXTROAMPHETAMINE SACCHARATE, AMPHETAMINE ASPARTATE, DEXTROAMPHETAMINE SULFATE AND AMPHETAMINE SULFATE 3.75; 3.75; 3.75; 3.75 MG/1; MG/1; MG/1; MG/1
15 TABLET ORAL 2 TIMES DAILY
Qty: 60 TABLET | Refills: 0 | Status: SHIPPED | OUTPATIENT
Start: 2023-10-06 | End: 2023-11-08

## 2023-09-14 RX ORDER — LAMOTRIGINE 200 MG/1
200 TABLET ORAL DAILY
Qty: 30 TABLET | Refills: 3 | Status: SHIPPED | OUTPATIENT
Start: 2023-09-14 | End: 2023-10-25 | Stop reason: SDUPTHER

## 2023-09-14 RX ORDER — ZOLPIDEM TARTRATE 10 MG/1
10 TABLET ORAL NIGHTLY PRN
Qty: 30 TABLET | Refills: 3 | Status: SHIPPED | OUTPATIENT
Start: 2023-09-22 | End: 2023-12-20

## 2023-09-14 SDOH — SOCIAL DETERMINANTS OF HEALTH (SDOH): PROBLEM RELATED TO PRIMARY SUPPORT GROUP, UNSPECIFIED: Z63.9

## 2023-09-14 NOTE — PATIENT INSTRUCTIONS
PLAN:   Follow up     10/25/2023  8:30 AM ESTABLISHED PATIENT - VIRTUAL [facility] - Psych [facility] 4th Fl Post, Kyree WALTERS MD       As discussed with him: will work toward getting him followed by psyc MD Telly Shetty given recent clinical reports / med regiment / in interim will follow Understanding Expressed    Meds: informs he moved down  to a single Adderall XR 30 mg / after discussion / mutually agree to adderall 15 mg twice daily  (equating to 30 mg total daily) #60 / 1 month x 2 refills     Renewed other  psyc meds as prior (Lamictal 200 mg #30 x3 and ambien  10 mg #30x 3 to [facility]    Robiyc MD placing referrals:    1) Janet Shi PhD /  neuropsych testing   2) [facility] Movement disorder / lip tremor     Continues attending AA as doing ; if need arises to return to counseling [phone] / (note: previously saw Chris Alberto [facility])    References: (reviewed with pt as well):    Anxiety &  phobia workbook by JULIANO Espitia PhD  (web retailers: used: $ 7-10)    Relaxation stress reduction workbook: ALFA Lal PhD ( used: $7-10)    Feeling Good Website: Kyree Sweeney MD / www.feelingFilament Labs.com website (free) PODCASTS    [place]: Path to Better Sleep : https://www.veterantraining.va.gov/insomnia/ (free)     Pt expressed appreciation for the visit today and did not have further question at this time though pt  was still informed to:     Call  if problems.    Call / Report Side Effects to Psyc MD     Encouraged to follow up with primary care / Gen Med MD for continued monitoring of general health and wellness.    Understanding was expressed; and no further concerns nor questions were raised at this time.     remember healthy self care:   eat right  attempt adequate rest   HANDWASHING / encourage such paris. During this corona virus time   walk or light exercise within reason and as your general med team approves  read or explore any of reference materials / homework mentioned  reach out  (I.e.,  connect with)  others who nuture and bring out best in you  avoid risky behaviors  keep your appointments  IF you  cannot make your appt THEN please call or go online to reschedule.  avoid  alcohol and illicit substances.  Look for the positive.  All is often relative-seek balance  Call sooner if needed : [phone]   Call 911 or go to Emergency Room  (ER)  if any acute concerns

## 2023-09-14 NOTE — PROGRESS NOTES
Andrei Casanova   1970 09/16/2023     Disclaimer: Evaluation and treatment is based on information presented to date. Any new information may affect assessment and findings.    The patient location is: Patient's home   and reported  that his home in [place], [place]    Visit type: Virtual visit with synchronous audio and video     Each patient to whom he or she provides medical services by telemedicine is: (1) informed of the relationship between the physician and patient and the respective role of any other health care provider with respect to management of the patient; and (2) notified that he or she may decline to receive medical services by telemedicine and may withdraw from such care at any time.    Patient was informed that I am a physician who is licensed in the [facility]:  Kyree Bhatt MD:  Employed by   [employer]     If technology issues arise: YUAN Bhatt MD will attempt to call pt back tho pt also instructed that he/she may  call our office phone at: [phone]    Pt informed that if he / she is ever in crisis (or has acute concerns): pt is instructed to Dial 911 or go to nearest Emergency Room (ER)    Pt informed that if questions related to privacy practices: pt is instructed to contact [facility] [employer] Information Department: [phone]    Understanding Expressed. No questions.    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 ; history alcohol and substance use; reports speaks to sponsor daily ; reports substance and alcohol free    Who (in attendance) :  Pt himself     he  says has intermittent mild tremor lip / told would put in for neurology referral /     as well says memory / more forgetful / says forgetfulness predates adderall dose decrease that he did on own (from Adderall XR 30 mg TWO daily to adderall XR 30 mg once daily)     told would put in referral to Janet Shi PhD / also discussed moving him to  based prescriber tho understands will need be  coordianted with BACILIO Starrmartine team       Note: asks for all meds to [facility]     S: Patient's Own Perception of Condition (& Side Effects) : reports more forgetful ; reports mild tremor lip    O:     By review of his Adderall history  had worked with Carlos Barber MD x years; early on when I first started working with him Fall 2020: pt himself reported  that he was prescribed and did well on adderall 30 mg Immed release twice  daily  (60 mg total dose) which he was continued on (via Adderall XR 30mg  (two daily)    Sept 2023 He informed  himself  moved to a single Adderall XR 30 mg (from prior Adderall XR 30 mg TWO capsules daily) ; he does not assoc with what he calling 'forget fulness' ; saying forgetfulness predated his decreasnig Adderall     Also tho he has had some lifestyle adjustments as now  since June 2023 to [place] who lives [facility] and he stays [facility] 3 days week and with her 2 days week / says deneis that such is impacting his concentration     He had been giving report after med check report x MULTIPLE MONTHS / that was doing well on regimen.    3-4 months ago  he was even given promotion at work / he felt  stress level went down / tho sayinbg that there are times when he does not  recall what was  spoken  about at meeting    Continues working going   [employer] in [facility] area / deneis any safety issues / also notes works with team. Have noted to him: IF any situation of risk then he would need as for leave while further reviewed tho deneis that situation applies. Understanding Expressed    Has not been in to see Chris Alberto LCSW since Aug 2022; aware he can reach out if desires      Medication: no longer on neuroleptic ; off  For some time; he  Tried Abilify  10 lb (wt gain)    then D/C Saphris (muscle stiffness)  Zyprexa (sluggish and blah)    Remains Lamictal (200mg)  And Ambien 10 mg / likes his medication regimen; has done well on such    Reports  "remains 'clean / sober' says  speaks to sponsor everyday ; got 3 yr chip day     Continues being sponsor for others in AA / says sponsors 4 people tho 2 are rather active; he also   Volunteers at [employer] ([employer]rei [employer] counselor) ; he liked such ; goes to [facility]; seems to take pride / value in participating there;     2023 confirms He himself attends AA 2-3 time a weeks    Says looking at retiring around   to ; reports that he has received 3 raises in 12 months which favorably affects his group home as well    mother passed  ;  dad remains living in [place]; getting along ok    Safety:no SI / no HI     Supports: enjoys his daughter ; she near graduation in  [employer] recently; (he is concerned about her seizure history)     Work / School     Activities tends like more solitarty activities / notes:  some social avoidant    Staying "Sober / "clean"  (I.e., Substance issue): says has sponsor / and participates in AA 2-3 x a week    Constitutional Health Concerns: forgetful > referral placing to neuropsyc ; tremor lip ; referring to neurology OL       Patient Active Problem List   Diagnosis    ADD (attention deficit disorder)    Bipolar affective    HTN (hypertension)    Alcohol abuse, in remission    Chews tobacco regularly    Mixed hyperlipidemia    Colon cancer screening    Special screening for malignant neoplasms, colon    Family history of colonic polyps    Bipolar 1 disorder, depressed    Social anxiety disorder    ADHD (attention deficit hyperactivity disorder), combined type    Cocaine abuse; full remission x years    Nicotine dependence, chewing tobacco, uncomplicated    Insomnia    Grief: Mom  Covid Dec 2020    Erythrocytosis    Family dynamic issues : dad's health, 16 yr old daughter living with ex wife / has seizures, other (see  notes)    Prostatitis    Self reports Impaired concentration (mild)    Tremor/ intermittent lips          Current Outpatient " Medications:     amLODIPine (NORVASC) 5 MG tablet, TAKE 1 TABLET BY MOUTH DAILY, Disp: 10 tablet, Rfl: 0    [START ON 10/6/2023] dextroamphetamine-amphetamine (ADDERALL) 15 mg tablet, Take 1 tablet (15 mg total) by mouth 2 (two) times a day., Disp: 60 tablet, Rfl: 0    [START ON 2023] dextroamphetamine-amphetamine (ADDERALL) 15 mg tablet, Take 1 tablet (15 mg total) by mouth 2 (two) times a day., Disp: 60 tablet, Rfl: 0    [START ON 2023] dextroamphetamine-amphetamine (ADDERALL) 15 mg tablet, Take 1 tablet (15 mg total) by mouth 2 (two) times a day., Disp: 60 tablet, Rfl: 0    lamoTRIgine (LAMICTAL) 200 MG tablet, Take 1 tablet (200 mg total) by mouth once daily. STOP all Lamictal IF any RASH and tell Psyc MD, Disp: 30 tablet, Rfl: 3    lisinopriL 10 MG tablet, TAKE 1 TABLET BY MOUTH DAILY, Disp: 10 tablet, Rfl: 0    [START ON 2023] zolpidem (AMBIEN) 10 mg Tab, Take 1 tablet (10 mg total) by mouth nightly as needed (INSOMNIA)., Disp: 30 tablet, Rfl: 3     Social History     Tobacco Use   Smoking Status Former    Current packs/day: 0.00    Average packs/day: 1 pack/day for 15.0 years (15.0 ttl pk-yrs)    Types: Cigarettes    Start date: 1994    Quit date: 2009    Years since quittin.5   Smokeless Tobacco Never        Review of patient's allergies indicates:   Allergen Reactions    Abilify [aripiprazole] Other (See Comments)     Weight gain 10 lbs    Remeron [mirtazapine] Other (See Comments)     Increased hunger and felt disoriented    Saphris [asenapine] Other (See Comments)     Muscle stiffness    Zoloft [sertraline] Other (See Comments)     Sex dysfunction (anorgasmia)    Zyprexa [olanzapine] Other (See Comments)     sluggish and emotionally flat      Impulse Control: no history SI / nor HI    Mental Status Exam:   Appearance: casual   Oriented: x 3  Attitude: cooperative   Eye Contact: good   Behavior: calm   Mood: ok  Cognition: alert   Concentration: grossly intact   Affect:  appropriate range   Anxiety: mild  Thought Process: goal directed   Speech: Volume : WNL   Quantity WNL   Quality: appears to openly answer questions   Eye Contact: good   Threats: no SI / no HI   Psychosis: denies all   Estimate of Intellectual Function: above average     ASSESSMENT:   Encounter Diagnoses   Name Primary?    Bipolar 1 disorder, depressed (Jan 2023: doing well on current med regimen (incl Lamictal 200 mg daily) Yes    ADHD (attention deficit hyperactivity disorder), combined type     Self reports Impaired concentration (mild)     Social anxiety disorder     Insomnia, unspecified type     Tremor/ intermittent lips     Cocaine abuse; full REMISSION x years     Family dynamic issues : dad's health, 16 yr old daughter (living with ex wife) has seizures, other (see SW notes)     Nicotine dependence, chewing tobacco, uncomplicated     Alcohol abuse, in FULL REMISSION sober 1 year 9-; in past 1/5 th a day           Patient Instructions     PLAN:   Follow up     10/25/2023  8:30 AM ESTABLISHED PATIENT - [facility]IRTUAL [facility] 4th Fl Post, Kyree WALTERS MD       As discussed with him: will work toward getting him followed by psyc MD Telly Shetty given recent clinical reports / med regiment / in interim will follow Understanding Expressed    Meds: informs he moved down  to a single Adderall XR 30 mg / after discussion / mutually agree to adderall 15 mg twice daily  (equating to 30 mg total daily) #60 / 1 month x 2 refills     Renewed other  psyc meds as prior (Lamictal 200 mg #30 x3 and ambien  10 mg #30x 3 to [facility]    Psyc MD placing referrals:    1) Janet Shi PhD /  neuropsych testing   2) [facility] Movement disorder / lip tremor     Continues attending AA as doing ; if need arises to return to counseling [phone] / (note: previously saw Chris Alberto LCSW)    References: (reviewed with pt as well):    Anxiety &  phobia workbook by JULIANO Espitia PhD  (web  retailers: used: $ 7-10)    Relaxation stress reduction workbook: ALFA Lal PhD ( used: $7-10)    Feeling Good Website: Kyree Sweeney MD / [email or web address] website (free) PODCASTS    [place]: Path to Better Sleep : https://www.veterantraining.va.gov/insomnia/ (free)     Pt expressed appreciation for the visit today and did not have further question at this time though pt  was still informed to:     Call  if problems.    Call / Report Side Effects to Psmeche SCHNEIDER     Encouraged to follow up with primary care / Gen Med MD for continued monitoring of general health and wellness.    Understanding was expressed; and no further concerns nor questions were raised at this time.     remember healthy self care:   eat right  attempt adequate rest   HANDWASHING / encourage such paris. During this corona virus time   walk or light exercise within reason and as your general med team approves  read or explore any of reference materials / homework mentioned  reach out (I.e.,  connect with)  others who nuture and bring out best in you  avoid risky behaviors  keep your appointments  IF you  cannot make your appt THEN please call or go online to reschedule.  avoid  alcohol and illicit substances.  Look for the positive.  All is often relative-seek balance  Call sooner if needed : [phone]   Call 911 or go to Emergency Room  (ER)  if any acute concerns    >>BACKGROUND from admit victoriano Eval Sept 2020 <<     Andrei collazo 53 y.o. Bipolar (depressed)  male presents today as referred by [facility] MD    Was seeing Carlos Barber MD for 8-10 yrs;he retired. 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 Dr Javed has continued on meds as from Dr Segovia    Pt has been maintained on:  on lamictal 200 mg (no rash)   adderall XR 30   ambien  5mg    Sober x 1 yr as of 9- via AA; prior to that 9 and 1/2 yr after relapsed    Says has sponsor    Says 'to be up front have had substance issues: alcohol and cocaine     Social anxiety ' big struggle for long time' ; more recently though time leave house; always struggle; people who not know think I outgoing; tho says push through it; makes feel present someone I am not    prior Juliette SCHNEIDER: tried paxil zoloft tho no clear recall of how did: interest to try /    Never abilify jack    Says he did Tried effexor XR (75 mg) difficult sex side effect;  Denies any suicidal ideation / nor gesture / nor any thoughts to harm other     No psychosis    Self Rating Scales: (Such submitted for scanning) :     Quick Inventory of Depression (QID-Short Form):21  Zung Anxiety Index:78   Mood Disorder Questionnaire (MDQ): 8  The [place] Framework: a "bio-psycho-social" screening form for use as clinical raw data. / (submitted for scanning)      World Health Organization (W.H.O.) Adult ADHD Self Report Scale (ASRS-henrique 1.1):  5 of 6 core and 6 of 12 adjunctive    PAST PSYCHIATRIC HISTORY:     Inpatient: " "n  Outpatient: (incl. Primary Care): bradley Barber MD      Past Surgical History:   Procedure Laterality Date    APPENDECTOMY      COLONOSCOPY N/A 2/25/2019    Procedure: COLONOSCOPY;  Surgeon: Denis Eller MD;  [facility];  Service: Endoscopy;  Laterality: N/A;    KNEE SURGERY      right    SHOULDER SURGERY      bilateral    SINUS SURGERY      TONSILLECTOMY      VASECTOMY        Other (medical) :    Head Injury: n  Seizure: cocaine use / in 20s; late 30's (couple mre)  Diabetes :n  HTN Yes   Other:     Substance Use:    Once start [employer] avoid cannabis    When revert would isolate ; did for release ; drug choice was cocaine    Alcohol: last 9-8 2020;   Start: 10y  regular use: 12y UNTIL decaon (1st period sober) when left start back drinking  At 12y: every weekend: beer 6 12 oa  16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 / 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; 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 (2-6 beer)    Blackouts: deneis   No seizure:    Relationships: when drink / isolate / fear of work consequence IF they learned     Was drinking 5th a day (3 yr ago; "drank hard = 5th a day"); until passed out; had get up 4 a or 4;30a ; in fear breathalizer; combat by get grits rodríguez     After deacon daily and cocaine    Went rehab: [facility] 1 month 2008 ; 2 months prior: in [facility]: 21 days (out pocket private); none other    When went [place], self report drugs and alcohol; they said do not worry; when got back ; was sent psychiatrist pradip said lied on questionairre as he said not drinknig so terminated    2 and 1/2 yrs later: he contact [facility] ; says was wrongfully terminated as self report; go job back    Says for 1st 2 y back weekly drug test and random; and employer said go see victoriano SCHNEIDER and go to counselor ; NOW just random.    Cannabis: 5th grade 1st  Try; 8th grade : at least 1x week; daily use: 16y until 18y then when came across    Tobacco:former ; stopped 2008 / start 21 yrs " olf  Cocaine:years ago   Benzo: n  Opioid: n  Ecstasy:n  Other:n     Grandparent: Drug problems brother drug problem   Parents uncles / depression   Daughter depression    Mother and aunt history significant anxiety     3 wishes? :  Serenity, comfortable in skin , (20y) daughter not have seizures or migranes     PSYCHO-SOCIAL DEVELOPMENT HISTORY:     City Born: [facility]    Siblings (full or half)  Brothers: 1 older 2 1/2   Sisters: none    Parents : alive     Briefly Describe  your Mom: poor health controlling   Briefly Describe your Dad: poor health unhappy anxious    Bio Mom: Occupation:    Bio Dad:  Occupation: car sales     Marital Status:  / x2 to same lady (15 y / 1995); 2 1/2 y Neida ([place]) ; speak frequently ; she RN school     Children   Girls  (ages): 1 daughter 20 y: seizures migraines vertigo / depression anxiety / [employer] / 2nd yr journalism  Boys (ages):     Physical Abuse: N    Sexual abuse  N    Other trauma / abuse? 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 n

## 2023-09-16 PROBLEM — R25.1 TREMOR: Status: ACTIVE | Noted: 2023-09-16

## 2023-09-16 PROBLEM — R41.840 IMPAIRED CONCENTRATION: Status: ACTIVE | Noted: 2023-09-16

## 2023-10-02 ENCOUNTER — PATIENT MESSAGE (OUTPATIENT)
Dept: PSYCHIATRY | Facility: CLINIC | Age: 53
End: 2023-10-02
Payer: COMMERCIAL

## 2023-10-03 ENCOUNTER — TELEPHONE (OUTPATIENT)
Dept: NEUROLOGY | Facility: CLINIC | Age: 53
End: 2023-10-03
Payer: COMMERCIAL

## 2023-10-03 ENCOUNTER — PATIENT MESSAGE (OUTPATIENT)
Dept: NEUROLOGY | Facility: CLINIC | Age: 53
End: 2023-10-03
Payer: COMMERCIAL

## 2023-10-03 NOTE — TELEPHONE ENCOUNTER
Called pt to discuss incorrectly scheduled appt with Dr Downey for memory concerns.   Pt, bipolar for which he takes Lamictal 200 mg qd.   Pt has been on long-term Adderall for ADHD and long-term Ambien for sleep. Pt is weaning doses of both as he believes Adderall has begun to make him anxious and Ambien may be contributing to his memory trouble.   Also endorses recent lip twitch.  Pt lives and works in [facility], is off on Fridays; recently re-, he spends weekends in his [place] home with his wife.   Offered virtual appt to start- with Dr Rodríguez Monday, as well as likely consult for NPSY testing.

## 2023-10-03 NOTE — TELEPHONE ENCOUNTER
Spoke with the patient. Offered next available appointment via VV with Dr Rodríguez at 9 am 10/9. . Pt accepted next available, verbalized understanding, and repeated back date/time/location of scheduled appointment.

## 2023-10-09 ENCOUNTER — OFFICE VISIT (OUTPATIENT)
Dept: NEUROLOGY | Facility: CLINIC | Age: 53
End: 2023-10-09
Payer: COMMERCIAL

## 2023-10-09 DIAGNOSIS — F90.2 ADHD (ATTENTION DEFICIT HYPERACTIVITY DISORDER), COMBINED TYPE: ICD-10-CM

## 2023-10-09 DIAGNOSIS — F31.9 BIPOLAR 1 DISORDER, DEPRESSED: ICD-10-CM

## 2023-10-09 DIAGNOSIS — R41.89 COGNITIVE DECLINE: Primary | ICD-10-CM

## 2023-10-09 DIAGNOSIS — R41.3 MEMORY LOSS: ICD-10-CM

## 2023-10-09 DIAGNOSIS — R41.3 OTHER AMNESIA: ICD-10-CM

## 2023-10-09 DIAGNOSIS — R25.1 TREMOR: ICD-10-CM

## 2023-10-09 PROCEDURE — 99204 OFFICE O/P NEW MOD 45 MIN: CPT | Mod: 95,,, | Performed by: PSYCHIATRY & NEUROLOGY

## 2023-10-09 PROCEDURE — 99204 PR OFFICE/OUTPT VISIT, NEW, LEVL IV, 45-59 MIN: ICD-10-PCS | Mod: 95,,, | Performed by: PSYCHIATRY & NEUROLOGY

## 2023-10-09 NOTE — PROGRESS NOTES
Neurology Telemedicine Note     Name: Andrei Casanova  MRN: [id number]   CSN: [id number]      Date: 10/09/2023    The patient location is: Home  The chief complaint leading to consultation is: Memory loss  Visit type: Virtual visit with synchronous audio and video    TOTAL TIME SPENT: 45 mins    Each patient to whom I provide medical services by telemedicine is:  (1) informed of the relationship between the physician and patient and the respective role of any other health care provider with respect to management of the patient; and (2) notified that they may decline to receive medical services by telemedicine and may withdraw from such care at any time.    History of Present Illness (HPI):  Patient is a 53-year-old male with past medical history of attention deficit, bipolar depression who presents to tele Neurology Clinic due to several months of worsening memory.  Patient states that he is having trouble recalling information from work meetings were held in the past.  He is not getting lost while driving nor are other people noticing any deficits.  He is also stated that he feels sometimes his lower lip twitches.  He is not accompanied by any acute confusional states.  Patient can still perform his activities of daily living appropriately.  He has been trying to decrease his Adderall and Ambien since he feels like this can interfere with his cognition.  He is not had any brain imaging. States that ever since he got COVID in the end of 2020, beg of 2021, he has felt like he has been struggling with his cognition.     Nonmotor/Premotor ROS: as per HPI, and all other systems are negative    Past Medical History: The patient  has a past medical history of ADHD (attention deficit hyperactivity disorder), Bipolar disorder, DVT (deep venous thrombosis), Family history of colonic polyps (2/25/2019), and Hypertension.    Social History: The patient  reports that he quit smoking about 14 years ago. He started smoking about 29  years ago. He has a 15.0 pack-year smoking history. He has never used smokeless tobacco. He reports that he does not drink alcohol and does not use drugs.    Family History: Their family history includes Diabetes in his father; Heart disease in his paternal grandmother; Hypertension in his father and mother.    Allergies: Abilify [aripiprazole], Remeron [mirtazapine], Saphris [asenapine], Zoloft [sertraline], and Zyprexa [olanzapine]     Meds:   Current Outpatient Medications on File Prior to Visit   Medication Sig Dispense Refill    amLODIPine (NORVASC) 5 MG tablet TAKE 1 TABLET BY MOUTH DAILY 10 tablet 0    dextroamphetamine-amphetamine (ADDERALL) 15 mg tablet Take 1 tablet (15 mg total) by mouth 2 (two) times a day. 60 tablet 0    [START ON 11/6/2023] dextroamphetamine-amphetamine (ADDERALL) 15 mg tablet Take 1 tablet (15 mg total) by mouth 2 (two) times a day. 60 tablet 0    [START ON 12/6/2023] dextroamphetamine-amphetamine (ADDERALL) 15 mg tablet Take 1 tablet (15 mg total) by mouth 2 (two) times a day. 60 tablet 0    lamoTRIgine (LAMICTAL) 200 MG tablet Take 1 tablet (200 mg total) by mouth once daily. STOP all Lamictal IF any RASH and tell Psyc MD 30 tablet 3    lisinopriL 10 MG tablet TAKE 1 TABLET BY MOUTH DAILY 10 tablet 0    zolpidem (AMBIEN) 10 mg Tab Take 1 tablet (10 mg total) by mouth nightly as needed (INSOMNIA). 30 tablet 3     No current facility-administered medications on file prior to visit.       Exam:  Vital Signs deferred with home visit                                        Neurological     * Mental status  Alert and oriented to person, place, time, and situation; no dysarthria; no aphasia; normal recent and remote memory; follows commands                                                         Medical Record Review:  - Lab Results:  No visits with results within 3 Month(s) from this visit.   Latest known visit with results is:   Lab Visit on 06/14/2021   Component Date Value Ref Range  Status    WBC 06/14/2021 5.30  3.90 - 12.70 K/uL Final    RBC 06/14/2021 5.88  4.60 - 6.20 M/uL Final    Hemoglobin 06/14/2021 17.9  14.0 - 18.0 g/dL Final    Hematocrit 06/14/2021 54.3 (H)  40.0 - 54.0 % Final    MCV 06/14/2021 92  82 - 98 fL Final    MCH 06/14/2021 30.4  27.0 - 31.0 pg Final    MCHC 06/14/2021 33.0  32.0 - 36.0 g/dL Final    RDW 06/14/2021 12.5  11.5 - 14.5 % Final    Platelets 06/14/2021 193  150 - 450 K/uL Final    MPV 06/14/2021 10.6  9.2 - 12.9 fL Final    Immature Granulocytes 06/14/2021 0.2  0.0 - 0.5 % Final    Gran # (ANC) 06/14/2021 2.9  1.8 - 7.7 K/uL Final    Immature Grans (Abs) 06/14/2021 0.01  0.00 - 0.04 K/uL Final    Lymph # 06/14/2021 1.7  1.0 - 4.8 K/uL Final    Mono # 06/14/2021 0.6  0.3 - 1.0 K/uL Final    Eos # 06/14/2021 0.1  0.0 - 0.5 K/uL Final    Baso # 06/14/2021 0.03  0.00 - 0.20 K/uL Final    nRBC 06/14/2021 0  0 /100 WBC Final    Gran % 06/14/2021 53.7  38.0 - 73.0 % Final    Lymph % 06/14/2021 32.1  18.0 - 48.0 % Final    Mono % 06/14/2021 11.7  4.0 - 15.0 % Final    Eosinophil % 06/14/2021 1.7  0.0 - 8.0 % Final    Basophil % 06/14/2021 0.6  0.0 - 1.9 % Final    Differential Method 06/14/2021 Automated   Final    Sodium 06/14/2021 140  136 - 145 mmol/L Final    Potassium 06/14/2021 4.6  3.5 - 5.1 mmol/L Final    Chloride 06/14/2021 103  95 - 110 mmol/L Final    CO2 06/14/2021 26  23 - 29 mmol/L Final    Glucose 06/14/2021 96  70 - 110 mg/dL Final    BUN 06/14/2021 13  6 - 20 mg/dL Final    Creatinine 06/14/2021 1.2  0.5 - 1.4 mg/dL Final    Calcium 06/14/2021 9.1  8.7 - 10.5 mg/dL Final    Total Protein 06/14/2021 7.4  6.0 - 8.4 g/dL Final    Albumin 06/14/2021 4.3  3.5 - 5.2 g/dL Final    Total Bilirubin 06/14/2021 0.6  0.1 - 1.0 mg/dL Final    Alkaline Phosphatase 06/14/2021 72  55 - 135 U/L Final    AST 06/14/2021 17  10 - 40 U/L Final    ALT 06/14/2021 26  10 - 44 U/L Final    Anion Gap 06/14/2021 11  8 - 16 mmol/L Final    eGFR if African American 06/14/2021 >60   >60 mL/min/1.73 m^2 Final    eGFR if non African American 2021 >60  >60 mL/min/1.73 m^2 Final       Diagnoses:   1) Cognitive decline, memory loss  2) Attention deficit  3) Bipolar disorder  4) Post Covid encephaloapthy    Patient denies any family history of neurodegenerative disease.  It is certainly possible that attention deficit or primary psychiatric issue is not well controlled especially since symptoms initially started appearing after he lost his mother and brother in  due to COVID.  Patient states that in the past few months feels like his memory has gotten worse.  We will obtain MRI, EEG, memory loss labs and neuropsychological testing.    Medical Decision Makin) MRI Brain  2) EEG  3) memory loss labs  4) Neuropsychological testing  5) f/u with psychiatry       I spent 45 minutes with the patient with >50% of the time spent with counseling and education regarding:    F/u after above completed    This is a consult performed through audio-visual using [employer] peggy. Pt and provider are in different locations. History and physical exam are limited due to the nature of this encounter.       Onel Rodríguez MD

## 2023-10-25 ENCOUNTER — OFFICE VISIT (OUTPATIENT)
Dept: PSYCHIATRY | Facility: CLINIC | Age: 53
End: 2023-10-25
Payer: COMMERCIAL

## 2023-10-25 DIAGNOSIS — R25.1 TREMOR: ICD-10-CM

## 2023-10-25 DIAGNOSIS — F14.10 COCAINE ABUSE: ICD-10-CM

## 2023-10-25 DIAGNOSIS — Z63.9 FAMILY DYNAMICS PROBLEM: ICD-10-CM

## 2023-10-25 DIAGNOSIS — F40.10 SOCIAL ANXIETY DISORDER: ICD-10-CM

## 2023-10-25 DIAGNOSIS — F17.220 NICOTINE DEPENDENCE, CHEWING TOBACCO, UNCOMPLICATED: ICD-10-CM

## 2023-10-25 DIAGNOSIS — F31.9 BIPOLAR 1 DISORDER, DEPRESSED: Primary | ICD-10-CM

## 2023-10-25 DIAGNOSIS — F90.2 ADHD (ATTENTION DEFICIT HYPERACTIVITY DISORDER), COMBINED TYPE: ICD-10-CM

## 2023-10-25 DIAGNOSIS — F10.11 ALCOHOL ABUSE, IN REMISSION: ICD-10-CM

## 2023-10-25 DIAGNOSIS — G47.00 INSOMNIA, UNSPECIFIED TYPE: ICD-10-CM

## 2023-10-25 PROCEDURE — 99215 PR OFFICE/OUTPT VISIT, EST, LEVL V, 40-54 MIN: ICD-10-PCS | Mod: 95,,, | Performed by: PSYCHIATRY & NEUROLOGY

## 2023-10-25 PROCEDURE — 99215 OFFICE O/P EST HI 40 MIN: CPT | Mod: 95,,, | Performed by: PSYCHIATRY & NEUROLOGY

## 2023-10-25 RX ORDER — DEXTROAMPHETAMINE SACCHARATE, AMPHETAMINE ASPARTATE, DEXTROAMPHETAMINE SULFATE AND AMPHETAMINE SULFATE 3.75; 3.75; 3.75; 3.75 MG/1; MG/1; MG/1; MG/1
15 TABLET ORAL 2 TIMES DAILY
Qty: 60 TABLET | Refills: 0 | Status: SHIPPED | OUTPATIENT
Start: 2023-11-08 | End: 2023-12-20

## 2023-10-25 RX ORDER — LAMOTRIGINE 200 MG/1
200 TABLET ORAL DAILY
Qty: 30 TABLET | Refills: 3 | Status: SHIPPED | OUTPATIENT
Start: 2023-10-25 | End: 2023-12-20 | Stop reason: SDUPTHER

## 2023-10-25 RX ORDER — ZOLPIDEM TARTRATE 5 MG/1
5 TABLET ORAL NIGHTLY PRN
Qty: 30 TABLET | Refills: 3 | Status: SHIPPED | OUTPATIENT
Start: 2023-10-25 | End: 2023-12-20 | Stop reason: SDUPTHER

## 2023-10-25 RX ORDER — DEXTROAMPHETAMINE SACCHARATE, AMPHETAMINE ASPARTATE, DEXTROAMPHETAMINE SULFATE AND AMPHETAMINE SULFATE 3.75; 3.75; 3.75; 3.75 MG/1; MG/1; MG/1; MG/1
15 TABLET ORAL 2 TIMES DAILY
Qty: 60 TABLET | Refills: 0 | Status: SHIPPED | OUTPATIENT
Start: 2024-01-05 | End: 2023-12-20

## 2023-10-25 RX ORDER — DEXTROAMPHETAMINE SACCHARATE, AMPHETAMINE ASPARTATE, DEXTROAMPHETAMINE SULFATE AND AMPHETAMINE SULFATE 3.75; 3.75; 3.75; 3.75 MG/1; MG/1; MG/1; MG/1
15 TABLET ORAL 2 TIMES DAILY
Qty: 60 TABLET | Refills: 0 | Status: SHIPPED | OUTPATIENT
Start: 2023-12-08 | End: 2023-12-20

## 2023-10-25 SDOH — SOCIAL DETERMINANTS OF HEALTH (SDOH): PROBLEM RELATED TO PRIMARY SUPPORT GROUP, UNSPECIFIED: Z63.9

## 2023-10-25 NOTE — PATIENT INSTRUCTIONS
PLAN:   Follow up     12/13/2023  8:30 AM ESTABLISHED PATIENT - [facility] 4th Fl   Arrive at: Telehealth Post, Kyree WALTERS MD       As discussed with him: will work toward getting him followed by psyc MD Telly Shetty given recent clinical reports / med regiment / in interim will follow Understanding Expressed    Meds:  now all from [facility]     mutually agreed to move to adderall 15 mg twice daily  (equating to 30 mg total daily) #60 / 1 month x 2 refills //  Historically: he did move down  to a single Adderall XR 30 mg down from taking TWO Adderall XR 30 mg (having been referred in from Dr Segovia in past)  / for supply chain and also flexibility on possible further tapering down     Renewed  (Lamictal 200 mg #30 x3) / Stop IF any Rash and Infirm, Psyc MD    ambien  he asks to move down to 5 mg from 10 mg #30x 3   Psyc MD placing referrals:    Reminder sent to  Janet Shi PhD / team /  re neuropsych testing    Follow up [facility] Dr Javed re imaging and appts     Continues attending AA as doing ; if need arises to return to counseling [phone] / (note: previously saw Chris Alberto LCSW)    References: (reviewed with pt as well):    Anxiety &  phobia workbook by JULIANO Espitia PhD  (web retailers: used: $ 7-10)    Relaxation stress reduction workbook: ALFA Lal PhD ( used: $7-10)    Feeling Good Website: Kyree Sweeney MD / [email or web address] website (free) PODCASTS    [place]: Path to Better Sleep : https://www.veterantraining.[place].gov/insomnia/ (free)     Pt expressed appreciation for the visit today and did not have further question at this time though pt  was still informed to:     Call  if problems.    Call / Report Side Effects to Psemche SCHNEIDER     Encouraged to follow up with primary care / Gen Med MD for continued monitoring of general health and wellness.    Understanding was expressed; and no further concerns nor questions were raised at this time.     remember healthy self  care:   eat right  attempt adequate rest   HANDWASHING / encourage such par[place]. During this corona virus time   walk or light exercise within reason and as your general med team approves  read or explore any of reference materials / homework mentioned  reach out (I.e.,  connect with)  others who nuture and bring out best in you  avoid risky behaviors  keep your appointments  IF you  cannot make your appt THEN please call or go online to reschedule.  avoid  alcohol and illicit substances.  Look for the positive.  All is often relative-seek balance  Call sooner if needed : [phone]   Call 911 or go to Emergency Room  (ER)  if any acute concerns

## 2023-10-25 NOTE — PROGRESS NOTES
Andrei Casanova   1970   10/25/2023     Disclaimer: Evaluation and treatment is based on information presented to date. Any new information may affect assessment and findings.    The patient location is: Patient's home   and reported  that his home in [place], [place]    Visit type: Virtual visit with synchronous audio and video     Each patient to whom he or she provides medical services by telemedicine is: (1) informed of the relationship between the physician and patient and the respective role of any other health care provider with respect to management of the patient; and (2) notified that he or she may decline to receive medical services by telemedicine and may withdraw from such care at any time.    Patient was informed that I am a physician who is licensed in the [facility]:  Kyree Bhatt MD:  Employed by   [employer]     If technology issues arise: YUAN Bhatt MD will attempt to call pt back th pt also instructed that he/she may  call our office phone at: [phone]    Pt informed that if he / she is ever in crisis (or has acute concerns): pt is instructed to Dial 911 or go to nearest Emergency Room (ER)    Pt informed that if questions related to privacy practices: pt is instructed to contact [employer] Information Department: [phone]    Understanding Expressed. No questions.    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 Feeling nervous, anxious, or on edge? 2 2 1   2. Not being able to stop or control worrying? 0 2 1   3. Worrying too much about  different things? 0 2 1   4. Trouble relaxing? 1 2 0   5. Being so restless that it is hard to sit still? 0 1 0   6. Becoming easily annoyed or irritable? 0 1 1   7. Feeling afraid as if something awful might happen? 0 1 0   8. If you checked off any problems, how difficult have these problems made it for you to do your work, take care of things at home, or get along with other people? 1 1 1   MATTHEW-7 Score 3 11 4             10/25/2023     8:51 AM 9/14/2023     9:46 AM 6/19/2023     9:54 AM 1/23/2023     9:59 AM 8/29/2022     9:54 AM 6/13/2022     5:09 PM 3/28/2022     8:48 AM   Depression Patient Health Questionnaire   Over the last two weeks how often have you been bothered by little interest or pleasure in doing things Not at all Not at all Not at all       Over the last two weeks how often have you been bothered by feeling down, depressed or hopeless Several days Several days Several days       PHQ-2 Total Score 1 1 1       Over the last two weeks how often have you been bothered by trouble falling or staying asleep, or sleeping too much Not at all Not at all Not at all       Over the last two weeks how often have you been bothered by feeling tired or having little energy Several days Not at all Several days       Over the last two weeks how often have you been bothered by a poor appetite or overeating Not at all Not at all Not at all       Over the last two weeks how often have you been bothered by feeling bad about yourself - or that you are a failure or have let yourself or your family down Not at all Not at all Not at all       Over the last two weeks how often have you been bothered by trouble concentrating on things, such as reading the newspaper or watching television More than half the days Several days Not at all       Over the last two weeks how often have you been bothered by moving or speaking so slowly that other people could have noticed. Or the opposite - being so fidgety or restless that you have  been moving around a lot more than usual. Not at all More than half the days Not at all       Over the last two weeks how often have you been bothered by thoughts that you would be better off dead, or of hurting yourself Not at all Not at all Not at all       If you checked off any problems, how difficult have these problems made it for you to do your work, take care of things at home or get along with other people? Somewhat difficult Somewhat difficult Somewhat difficult       PHQ-9 Score 4 4 2       PHQ-9 Interpretation Minimal or None Minimal or None Minimal or None           Information is confidential and restricted. Go to Review Flowsheets to unlock data.          Saw Neurology  for  mild intermittent tremor rae / Tegan SCHNEIDER placed order for imaging referral neuropsych testing     Had reported  memory / more forgetful / says forgetfulness predates adderall dose.    In past / was referred in from Dr VENANCIO Segovia on Adderall XR 30 mg TWO daily / over course treatment here  / was able move down to  adderall XR 30 mg ONCE daily Tho mutually agreed to move to adderall 15 mg twice daily  (equating to 30 mg total daily) #60 / 1 month x 2 refills     Order placed neuropsych testing to Janet Shi PhD / does not appear such has been scheduled yet I will send a reminder to Dr. Shi   Also tho he has had some lifestyle adjustments as now  since June 2023 to [place] who lives [facility] and he stays [facility] 3 days week and with her 2 days week / says deneis that such is impacting his concentration     He had been giving report after med check report x MULTIPLE MONTHS / that was doing well on regimen.    3-4 months ago  he was even given promotion at work / he felt  stress level went down / tho sayinbg that there are times when he does not  recall what was  spoken  about at meeting    Continues working going   [employer] in [facility] area / deneis any safety issues / also notes works with team. Have  "noted to him: IF any situation of risk then he would need as for leave while further reviewed tho deneis that situation applies. Understanding Expressed    Medication : no longer on neuroleptic ; off  For some time; he  Tried Abilify  10 lb (wt gain)    then D/C Saphris (muscle stiffness)  Zyprexa (sluggish and blah) / moved to [facility] and found helpful     Reports remains 'clean / sober' says  speaks to sponsor everyday ; got 3 yr chip esterday     Continues being sponsor for others in AA / says sponsors 4 people tho 2 are rather active; he also   Volunteers at [employer] (Andrei Briones counselor) ; he liked such ; goes to [facility]; seems to take pride / value in participating there;     June 2023 confirms He himself attends AA 2-3 time a weeks    Says looking at retiring around  2025 to 2027; reports that he has received 3 raises in 12 months which favorably affects his correction as well    mother passed 2021 ;  dad remains living in [place]; getting along ok    Safety:no SI / no HI     Supports: enjoys his daughter ; she near graduation in  [employer] recently; (he is concerned about her seizure history)     Work / School     Activities tends like more solitarty activities / notes:  some social avoidant    Staying "Sober / "clean"  (I.e., Substance issue): says has sponsor / and participates in AA 2-3 x a week    Constitutional Health Concerns: forgetful > referral placing to neuropsyc ; tremor lip ; referring to neurology [facility]       Patient Active Problem List   Diagnosis    ADD (attention deficit disorder)    Bipolar affective    HTN (hypertension)    Alcohol abuse, in remission    Chews tobacco regularly    Mixed hyperlipidemia    Colon cancer screening    Special screening for malignant neoplasms, colon    Family history of colonic polyps    Bipolar 1 disorder, depressed    Social anxiety disorder    ADHD (attention deficit hyperactivity disorder), combined type    Cocaine abuse; full remission x " years    Nicotine dependence, chewing tobacco, uncomplicated    Insomnia    Grief: Mom  Covid Dec 2020    Erythrocytosis    Family dynamic issues : dad's health, 16 yr old daughter living with ex wife / has seizures, other (see SW notes)    Prostatitis    Self reports Impaired concentration (mild)    Tremor/ intermittent lips          Current Outpatient Medications:     amLODIPine (NORVASC) 5 MG tablet, TAKE 1 TABLET BY MOUTH DAILY, Disp: 10 tablet, Rfl: 0    dextroamphetamine-amphetamine (ADDERALL) 15 mg tablet, Take 1 tablet (15 mg total) by mouth 2 (two) times a day., Disp: 60 tablet, Rfl: 0    [START ON 2023] dextroamphetamine-amphetamine (ADDERALL) 15 mg tablet, Take 1 tablet (15 mg total) by mouth 2 (two) times a day., Disp: 60 tablet, Rfl: 0    [START ON 2023] dextroamphetamine-amphetamine (ADDERALL) 15 mg tablet, Take 1 tablet (15 mg total) by mouth 2 (two) times a day., Disp: 60 tablet, Rfl: 0    [START ON 2023] dextroamphetamine-amphetamine (ADDERALL) 15 mg tablet, Take 1 tablet (15 mg total) by mouth 2 (two) times a day., Disp: 60 tablet, Rfl: 0    [START ON 2024] dextroamphetamine-amphetamine (ADDERALL) 15 mg tablet, Take 1 tablet (15 mg total) by mouth 2 (two) times a day., Disp: 60 tablet, Rfl: 0    lamoTRIgine (LAMICTAL) 200 MG tablet, Take 1 tablet (200 mg total) by mouth once daily. STOP all Lamictal IF any RASH and tell Psyc MD, Disp: 30 tablet, Rfl: 3    lisinopriL 10 MG tablet, TAKE 1 TABLET BY MOUTH DAILY, Disp: 10 tablet, Rfl: 0    zolpidem (AMBIEN) 10 mg Tab, Take 1 tablet (10 mg total) by mouth nightly as needed (INSOMNIA)., Disp: 30 tablet, Rfl: 3    zolpidem (AMBIEN) 5 MG Tab, Take 1 tablet (5 mg total) by mouth nightly as needed (INSOMNIA)., Disp: 30 tablet, Rfl: 3     Social History     Tobacco Use   Smoking Status Former    Current packs/day: 0.00    Average packs/day: 1 pack/day for 15.0 years (15.0 ttl pk-yrs)    Types: Cigarettes    Start date: 1994    Quit  date: 2009    Years since quittin.6   Smokeless Tobacco Never        Review of patient's allergies indicates:   Allergen Reactions    Abilify [aripiprazole] Other (See Comments)     Weight gain 10 lbs    Remeron [mirtazapine] Other (See Comments)     Increased hunger and felt disoriented    Saphris [asenapine] Other (See Comments)     Muscle stiffness    Zoloft [sertraline] Other (See Comments)     Sex dysfunction (anorgasmia)    Zyprexa [olanzapine] Other (See Comments)     sluggish and emotionally flat      Impulse Control: no history SI / nor HI    Mental Status Exam:   Appearance: casual   Oriented: x 3  Attitude: cooperative   Eye Contact: good   Behavior: calm   Mood: ok  Cognition: alert   Affect: appropriate range   Anxiety: mild  Thought Process: goal directed   Speech: Volume : WNL   Quantity WNL   Quality: appears to openly answer questions   Eye Contact: good   Threats: no SI / no HI   Psychosis: denies all   Estimate of Intellectual Function: above average     ASSESSMENT:   Encounter Diagnoses   Name Primary?    Bipolar 1 disorder, depressed (2023: doing well on current med regimen (incl Lamictal 200 mg daily) Yes    ADHD (attention deficit hyperactivity disorder), combined type     Social anxiety disorder     Insomnia, unspecified type     Tremor/ intermittent lips     Cocaine abuse; full REMISSION x years     Alcohol abuse, in FULL REMISSION sober 1 year -; in past / th a day      Family dynamic issues : dad's health, 16 yr old daughter (living with ex wife) has seizures, other (see SW notes)     Nicotine dependence, chewing tobacco, uncomplicated            Patient Instructions     PLAN:   Follow up     2023  8:30 AM ESTABLISHED PATIENT - VIRTUAL Georges cornelius - Psych [facility] 4th Fl   Arrive at: Telehealth Post, Kyree WALTERS MD       As discussed with him: will work toward getting him followed by psyc MD Telly Shetty given recent clinical reports / med regiment / in interim  will follow Understanding Expressed    Meds:  now all from [facility]     mutually agreed to move to adderall 15 mg twice daily  (equating to 30 mg total daily) #60 / 1 month x 2 refills //  Historically: he did move down  to a single Adderall XR 30 mg down from taking TWO Adderall XR 30 mg (having been referred in from Dr Segovia in past)  / for supply chain and also flexibility on possible further tapering down     Renewed  (Lamictal 200 mg #30 x3) / Stop IF any Rash and Infirm, Psyc MD    ambien  he asks to move down to 5 mg from 10 mg #30x 3   Psyc MD placing referrals:    Reminder sent to  Janet Shi PhD / team /  re neuropsych testing    Follow up [facility] Dr Javed re imaging and appts     Continues attending AA as doing ; if need arises to return to counseling [phone] / (note: previously saw Chris Alberto LCSLASHONDA)    References: (reviewed with pt as well):    Anxiety &  phobia workbook by JULIANO Espitia PhD  (web retailers: used: $ 7-10)    Relaxation stress reduction workbook: ALFA Lal PhD ( used: $7-10)    Feeling Good Website: Kyree Sweeney MD / [email or web address] website (free) PODCASTS    [place]: Path to Better Sleep : https://www.veterantraining.va.gov/insomnia/ (free)     Pt expressed appreciation for the visit today and did not have further question at this time though pt  was still informed to:     Call  if problems.    Call / Report Side Effects to Psmeche SCHNEIDER     Encouraged to follow up with primary care / Gen Med MD for continued monitoring of general health and wellness.    Understanding was expressed; and no further concerns nor questions were raised at this time.     remember healthy self care:   eat right  attempt adequate rest   HANDWASHING / encourage such paris. During this corona virus time   walk or light exercise within reason and as your general med team approves  read or explore any of reference materials / homework mentioned  reach out (I.e.,  connect with)  others who nuture  "and bring out best in you  avoid risky behaviors  keep your appointments  IF you  cannot make your appt THEN please call or go online to reschedule.  avoid  alcohol and illicit substances.  Look for the positive.  All is often relative-seek balance  Call sooner if needed : [phone]   Call 911 or go to Emergency Room  (ER)  if any acute concerns    >>BACKGROUND from admit victoriano Romero Sept 2020 <<     Andrei collazo 53 y.o. Bipolar (depressed)  male presents today as referred by [facility]    Was seeing Carlos Barber MD for 8-10 yrs;he retired. 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 Dr Javed has continued on meds as from Dr Segovia    Pt has been maintained on:  on lamictal 200 mg (no rash)   adderall XR 30   ambien  5mg    Sober x 1 yr as of 9- via AA; prior to that 9 and 1/2 yr after relapsed    Says has sponsor    Says 'to be up front have had substance issues: alcohol and cocaine " "    Social anxiety ' big struggle for long time' ; more recently though time leave house; always struggle; people who not know think I outgoing; tho says push through it; makes feel present someone I am not    prior Juliette SCHNEIDER: tried paxil zoloft tho no clear recall of how did: interest to try /    Never abilify jack    Says he did Tried effexor XR (75 mg) difficult sex side effect;  Denies any suicidal ideation / nor gesture / nor any thoughts to harm other     No psychosis    Self Rating Scales: (Such submitted for scanning) :     Quick Inventory of Depression (QID-Short Form):21  Zung Anxiety Index:78   Mood Disorder Questionnaire (MDQ): 8  The Milepost Framework: a "bio-psycho-social" screening form for use as clinical raw data. / (submitted for scanning)      World Health Organization (W.H.O.) Adult ADHD Self Report Scale (ASRS-henrique 1.1):  5 of 6 core and 6 of 12 adjunctive    PAST PSYCHIATRIC HISTORY:     Inpatient: n  Outpatient: (incl. 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    When revert would isolate ; did for release ; drug choice was cocaine    Alcohol: last 9-8 2020;   Start: 10y  regular use: 12y UNTIL decaon (1st period sober) when left start back drinking  At 12y: every weekend: beer 6 12 oa  16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 / 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; 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 (2-6 beer)    Blackouts: deneis   No seizure:    Relationships: when drink / isolate / " "fear of work consequence IF they learned     Was drinking 5th a day (3 yr ago; "drank hard = 5th a day"); until passed out; had get up 4 a or 4;30a ; in fear breathalizer; combat by [employer]     After deacon daily and cocaine    Went rehab: [facility] 1 month 2008 ; 2 months prior: in [place]: 21 days (out pocket private); none other    When went [place], self report drugs and alcohol; they said do not worry; when got back ; was sent psychiatrist pradip said lied on questionairre as he said not drinknig so terminated    2 and 1/2 yrs later: he contact [facility] ; says was wrongfully terminated as self report; go job back    Says for 1st 2 y back weekly drug test and random; and employer said go see psyc MD and go to counselor ; NOW just random.    Cannabis: 5th grade 1st  Try; 8th grade : at least 1x week; daily use: 16y until 18y then when came across    Tobacco:former ; stopped 2008 / start 21 yrs olf  Cocaine:years ago   Benzo: n  Opioid: n  Ecstasy:n  Other:n     Grandparent: Drug problems brother drug problem   Parents uncles / depression   Daughter depression    Mother and aunt history significant anxiety     3 wishes? :  Serenity, comfortable in skin , (20y) daughter not have seizures or migranes     PSYCHO-SOCIAL DEVELOPMENT HISTORY:     [facility]: [facility]    Siblings (full or half)  Brothers: 1 older 2 1/2   Sisters: none    Parents : alive     Briefly Describe  your Mom: poor health controlling   Briefly Describe your Dad: poor health unhappy anxious    Bio Mom: Occupation:    Bio Dad:  Occupation: car sales     Marital Status:  / x2 to same lady (15 y / 1995); 2 1/2 y [place] ([place], [place]) ; speak frequently ; she RN school     Children   Girls  (ages): 1 daughter 20 y: seizures migraines vertigo / depression anxiety / [employer] / 2nd yr journalism  Boys (ages):     Physical Abuse: N    Sexual abuse  N    Other trauma / abuse? 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 n

## 2023-11-27 ENCOUNTER — LAB VISIT (OUTPATIENT)
Dept: LAB | Facility: HOSPITAL | Age: 53
End: 2023-11-27
Attending: PSYCHIATRY & NEUROLOGY
Payer: COMMERCIAL

## 2023-11-27 ENCOUNTER — OFFICE VISIT (OUTPATIENT)
Dept: NEUROLOGY | Facility: CLINIC | Age: 53
End: 2023-11-27
Payer: COMMERCIAL

## 2023-11-27 DIAGNOSIS — F31.9 BIPOLAR 1 DISORDER, DEPRESSED: Primary | ICD-10-CM

## 2023-11-27 DIAGNOSIS — R41.89 COGNITIVE DECLINE: ICD-10-CM

## 2023-11-27 DIAGNOSIS — F41.1 GAD (GENERALIZED ANXIETY DISORDER): ICD-10-CM

## 2023-11-27 DIAGNOSIS — F31.9 BIPOLAR 1 DISORDER, DEPRESSED: ICD-10-CM

## 2023-11-27 DIAGNOSIS — F40.10 SOCIAL ANXIETY DISORDER: ICD-10-CM

## 2023-11-27 DIAGNOSIS — R41.9 COGNITIVE COMPLAINTS: ICD-10-CM

## 2023-11-27 DIAGNOSIS — F90.2 ADHD (ATTENTION DEFICIT HYPERACTIVITY DISORDER), COMBINED TYPE: ICD-10-CM

## 2023-11-27 DIAGNOSIS — F14.21 COCAINE USE DISORDER, MODERATE, IN SUSTAINED REMISSION: ICD-10-CM

## 2023-11-27 DIAGNOSIS — F10.21 ALCOHOL USE DISORDER, MODERATE, IN SUSTAINED REMISSION, DEPENDENCE: ICD-10-CM

## 2023-11-27 DIAGNOSIS — R41.3 MEMORY LOSS: ICD-10-CM

## 2023-11-27 LAB
AMMONIA PLAS-SCNC: 29 UMOL/L (ref 10–50)
FOLATE SERPL-MCNC: 8.9 NG/ML (ref 4–24)
T4 FREE SERPL-MCNC: 0.83 NG/DL (ref 0.71–1.51)
TSH SERPL DL<=0.005 MIU/L-ACNC: 1.01 UIU/ML (ref 0.4–4)
VIT B12 SERPL-MCNC: 505 PG/ML (ref 210–950)

## 2023-11-27 PROCEDURE — 86592 SYPHILIS TEST NON-TREP QUAL: CPT | Performed by: PSYCHIATRY & NEUROLOGY

## 2023-11-27 PROCEDURE — 84443 ASSAY THYROID STIM HORMONE: CPT | Performed by: PSYCHIATRY & NEUROLOGY

## 2023-11-27 PROCEDURE — 99999 PR PBB SHADOW E&M-EST. PATIENT-LVL I: CPT | Mod: PBBFAC,,, | Performed by: STUDENT IN AN ORGANIZED HEALTH CARE EDUCATION/TRAINING PROGRAM

## 2023-11-27 PROCEDURE — 99499 UNLISTED E&M SERVICE: CPT | Mod: S$GLB,,, | Performed by: STUDENT IN AN ORGANIZED HEALTH CARE EDUCATION/TRAINING PROGRAM

## 2023-11-27 PROCEDURE — 84425 ASSAY OF VITAMIN B-1: CPT | Performed by: PSYCHIATRY & NEUROLOGY

## 2023-11-27 PROCEDURE — 82140 ASSAY OF AMMONIA: CPT | Performed by: PSYCHIATRY & NEUROLOGY

## 2023-11-27 PROCEDURE — 36415 COLL VENOUS BLD VENIPUNCTURE: CPT | Performed by: PSYCHIATRY & NEUROLOGY

## 2023-11-27 PROCEDURE — 82607 VITAMIN B-12: CPT | Performed by: PSYCHIATRY & NEUROLOGY

## 2023-11-27 PROCEDURE — 84439 ASSAY OF FREE THYROXINE: CPT | Performed by: PSYCHIATRY & NEUROLOGY

## 2023-11-27 PROCEDURE — 99499 NO LOS: ICD-10-PCS | Mod: S$GLB,,, | Performed by: STUDENT IN AN ORGANIZED HEALTH CARE EDUCATION/TRAINING PROGRAM

## 2023-11-27 PROCEDURE — 90791 PR PSYCHIATRIC DIAGNOSTIC EVALUATION: ICD-10-PCS | Mod: S$GLB,,, | Performed by: STUDENT IN AN ORGANIZED HEALTH CARE EDUCATION/TRAINING PROGRAM

## 2023-11-27 PROCEDURE — 82746 ASSAY OF FOLIC ACID SERUM: CPT | Performed by: PSYCHIATRY & NEUROLOGY

## 2023-11-27 PROCEDURE — 99999 PR PBB SHADOW E&M-EST. PATIENT-LVL I: ICD-10-PCS | Mod: PBBFAC,,, | Performed by: STUDENT IN AN ORGANIZED HEALTH CARE EDUCATION/TRAINING PROGRAM

## 2023-11-27 PROCEDURE — 90791 PSYCH DIAGNOSTIC EVALUATION: CPT | Mod: S$GLB,,, | Performed by: STUDENT IN AN ORGANIZED HEALTH CARE EDUCATION/TRAINING PROGRAM

## 2023-11-27 NOTE — PROGRESS NOTES
CONFIDENTIAL NEUROPSYCHOLOGICAL EVALUATION    NAME:  Andrei Casanova DATE OF SERVICE: 2023   MRN#:  [id number] EDUCATION: 12   AGE: 53 y.o. HANDEDNESS: Right    : 1970 RACE: White   SEX: Male REFERRAL: Kyree Bhatt MD;  Psychiatry, [facility]     Referral question and neuropsychological necessity: Mr. Casanova is a 53 y.o., right-handed, white male with 12 years of formal education who was referred by his psychiatrist due to cognitive concerns in the context of bipolar 1 disorder and ADHD.    Evaluation methods: I had the pleasure of seeing Andrei Casanova on 2023 in person at the [facility], Department of Neurology. Data sources for the below report include review of the available medical record, an interview with the patient, and a collateral interview with their wife with their expressed consent. At the outset of the appointment, the undersigned explained the rationale for the evaluation along with the limits of confidentiality; and verbal informed consent for this evaluation was obtained.      Summary and Impressions     Mr. Casanova is a 53 y.o., right-handed, White, male with 12 years of formal education. He was referred by his psychiatrist due to cognitive concerns in the context of a history of bipolar 1 disorder most recent episode depressed, ADHD predominately inattentive subtype, and new onset of a lip tremor for which he has been referred for movement disorders neurology visit [facility] Medical history is additionally notable for social anxiety, alcohol and cocaine use disorders in sustained remission; hypertension, hyperlipidemia, and insomnia. Mr. Casanova was seen in neurology by Onel Rodríguez via [facility] on 10/09/2023, at which time consideration was given for uncontrolled psychiatric factors affecting memory vs a neurocognitive process; brain MRI, EEG, memory loss labs, and neuropsychological testing were recommended though have  not yet been completed. He completed a CT angiogram on 07/30/2016 due to a family history of aneurysm, interpreted as within normal limits.     During interview, Mr. Casanova and his wife reported the insidious onset and slowly worsening course of cognitive concerns beginning approximately one year ago, with noticeable worsening 6 months ago after his father's death. This is superimposed on a period of transient cognitive difficulties following his and his family's yue COVID-19 in 12/2020 and the subsequent deaths of his mother and brother in 01/2021 due to COVID-19. At this time, he and his wife characterized difficulties with word-finding and memory for recent information that he could not better attribute to anxiety, work-related changes, or other identifiable factors. He and his wife reported that he has recently reduced his dosage of Adderall, which has improved anxiety and subsequently led to improvements in attention and concentration, executive functions, and his social, interpersonal functioning. Mr. Casanova denied changes in functional independence related to his above cognitive concerns. He continues to work full-time following a recent promotion.     Emotionally, Mr. Casanova discussed current clinically significant anxiety and denied other mood concerns. He reported a history of depression and yves, as well as past suicide attempt but denied any current or recent symptoms suggesting increased risk of harm to himself, for a major depressive episode, or for yves. Anxiety appears to present both as generalized anxiety disorder as detailed below; but also with significant social anxiety that is independent of his more generalized pattern of worry. He and his wife discussed that symptoms of anxiety and grief have been exacerbating factors in his cognitive concerns. For example he reported that although word-finding errors occur independently of anxiety, the anxiety that these errors produce in social  or work environments in particular subsequently worsen expressive language skills.  Consistent with his available records, Mr. Casanova discussed a history of both alcohol and cocaine use disorders, currently in sustained remission outside of a controlled environment.    Mr. Casanova has a history of the insidious onset and progressive course of cognitive concerns that have been observed by he and his wife. Although anxiety, grief, and medication effects may be contributory to his cognitive concerns, he has a history of the above risk factors for cognitive decline chiefly daily heavy alcohol use in excess of 16 drinks per day which increases the risk of an organic etiology for his cognitive concerns. He does not have psychological or medical concerns that would preclude gathering neuropsychological test data at this time. As such, formal neuropsychological testing is clinically indicated in order to aid in differential diagnosis and treatment planning.    ICD-10-CM Diagnoses     1. Bipolar 1 disorder, depressed        2. Social anxiety disorder        3. Alcohol use disorder, moderate, in sustained remission, dependence        4. Cocaine use disorder, moderate, in sustained remission        5. MATTHEW (generalized anxiety disorder)        6. Cognitive complaints          Plan/ Recommendations     Provider Recommendations:   On the basis of the above summary, neuropsychological testing is clinically indicated at this time. Mr. Casanova will be scheduled for comprehensive neuropsychological testing. A detailed report including detailed diagnostic information and recommendations will be completed after testing has been completed.     Mr. Casanova reported lessened anxiety and improved attention and executive functioning following your planned reduction of Addreall, supporting this decision.      Patient Recommendations:   The next step in your care is to complete neuropsychological testing. Our office staff will reach out to  you to schedule an appointment for the testing portion of your evaluation. Please review your after visit summary for more information about your testing appointment.     Presenting concerns      Presenting Problem/Primary Concern: Cognitive changes in the context of the above psychiatric and medical history.    Onset and course of Cognitive Concerns: He discussed that the first time he perceived difficulties was following his yue COVID approximately on 12/26/2020 and the deaths of his mother and his brother from COVID at that time; and he discussed that he returned to his cognitive baseline over the course of six months as symptoms attributed to COVID and grief abated. Beginning approximately one year ago he noticed the insidious onset and progressive course of difficulties with memory for recent information, with an instance of worsening 6 months ago after the death of his father, during he and his wife's honeymoon.     Characterization of Cognitive Concerns  Attention/ working memory: Mr. Casanova has a history of ADHD predominately inattentive subtype. He has reduced his dosage of Adderall from 60mg/ day to 15mg/ day and discussed that due to reduced anxiety, his focus is improved on the lower dose.    Processing speed: Mr. Casanova discussed that with reduced work related demands on quick processing speed he has perceived slowing of processing speed. 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     Language: Mr. Casanova reported difficulties with expressive language, specifically word-finding difficulty and retrieving names.  This is a frustrating symptom for him because it affects his ability to communicate with others. He denied paraphasic errors.     Visual-spatial/ navigation: Mr. Casanova denied difficulties with visual-spatial skills or navigation.    Psychomotor/ Sensory: Mr. Casanova discussed jaw tightening and bottom lip quivering that has ceased or drastically reduced since  "reducing Adderall. He discussed that anxiety produces this symptom. He denied right-left confusion, dysgraphia, or sensory concerns.     Memory: Mr. Casanova discussed difficulties with memory for recent verbal information, e.g. conversations with subtle benefit from reminder cueing. He denied errors in visual memory or navigation.    Decision making: Mr. Casanova denied difficulties in this area except as would be better explained by not retaining details. He discussed improved multitasking since reducing Adderall due to associated improvement in anxiety. He discussed that he is less-often sidetracked or derailed by distractions than in the past.     Behavioral changes: Mr. Casanova and his wife denied problematic behavioral changes. Rather he was described by his wife as less "intense" and high strung than he was prior to reducing Adderall as above.     Orientation: Mr. Casanova denied errors in orientation to person, place, time, or situation.      Current Mental Health  Current mood:  Mr. Casanova described his mood as "good."     Relevant current mood concerns: Mr. Casanova discussed anxiety that occurs "in general" and in social settings. He discussed that perceiving that he is not as sharp as he was creates worry and fear as well. He discussed generalized worry about a multitude of facotrs including his family, plans, scheduling, social events, and work as above. Anxiety is difficult to control, results in tension in his upper back, leads to irritability, and affects sleep due to ruminative thinking.     In addition to generalized anxiety, he reported social anxiety related to his work and how his work interfaces with memory and word-finding errors in particular. He discussed that in situations where he may be subject to social scrutiny or perceived judgment anxiety is significantly elevated and often tolerated with considerable difficulty.    Hallucinations and delusions: Mr. Casanova denied experiencing " "hallucinations and there is no concern for delusional thinking.    Physical Status  Pain: Mr. Casanova discussed lower back pain daily throughout the day. He sometimes is awakened by pain although pain is improved recently. Naproxen works well for him, he does not take it daily. Pain at its worst can be a "10"/ 10 but this has not been the case in quite some time.  Sleep: Mr. Casanova reported an average sleep duration of 6 hours per night, with up to 8 hours at most. He discussed one night per week of laying awake at night after awakening at 02:00. He denied HOMAR symptoms or parasomnia  Energy: Mr. Casanova denied fatigue.   Exercise/ therapy: Mr. Casanova reported that exercise is reduced. He used to do push-ups and sit ups daily, and the eliptical.   Balance/ gait: Mr. Casanova denied a history of gait disturbances.   Falls: Mr. Casanova denied a history of falls.   Sensory changes: Mr. Casanova denied sensory changes.  He has hearing loss and is without hearing aides.     Functional Abilities/Changes: Mr. Casanova reported that he is independent and effective in all basic and instrumental activities of daily living.     Prior Neuropsychological Assessment: Mr. Casanova reported that he has not had prior neuropsychological testing.       Medical History     Relevant past medical history:  Past Medical History:   Diagnosis Date    ADHD (attention deficit hyperactivity disorder)     Bipolar disorder     DVT (deep venous thrombosis)     Family history of colonic polyps 2/25/2019    He states that his father had colon polyps.    Hypertension        Relevant early developmental history: Mr. Casanova denied any personal history of early life concerns that affected his cognitive functioning or development.    Additional neurological: Mr. Casanova reported low oxygen saturation when he contracted COVID, noting an oxygen saturation in the 70's via his at-home device. He discussed that he presented to the ED, was observed to have " improved saturation with mild exertion, and was not admitted to the hospital at that time.    Relevant neuroimaging/ diagnostic studies: (asterisks redacted from outside records for Epic formatting)  CTA Head     INDICATION: Headache   ha. family hx of aneurysm     COMPARISON: none     TECHNIQUE: Automated exposure control was utilized.     DISCUSSION:     The precontrast head CT demonstrates no acute abnormalities. On the delayed postcontrast head CT, there are no enhancing abnormalities.     Thin section spiral CT acquisition of the brain was obtained in the arterial phase of contrast. Multiplanar MIP reconstructions and 3-D reconstructions were reviewed.     The petrous, cavernous, and supraclinoid internal carotid arteries are patent. The M1 segments are patent. There is normal branching of the M2 segments within the Sylvian fissures. The vertebral arteries are patent. The basilar artery is patent. The P1 segments are patent. There is an A-comm, and the A1 and A2 segments are patent. There is no evidence of intracranial aneurysm.       IMPRESSION:     No evidence of aneurysm or flow limiting stenosis. No acute intracranial abnormalities.   BUN: 16, CREAT 1.04, Was Iodinated IV Contrast Used? (excluding gastroview) Yes, Contrast Given omni 350, Amount 90cc                                             FINAL    Transcribed By:  MAS   Transcribed Date/Time:  30-JUL-2016 18:03                                            Electronically Signed By:  Garett Williamson MD                                          Proxied for:  Gaertt Williamson MD                                              Signed Date/Time:  30-JUL-2016 18:05     Relevant family history: Mr. Casanova denied a family history of early-onset cognitive decline or relevant neurologic illness in any first-degree relatives.     Current medications: Mr. Casanova has a current medication list which includes the following prescription(s): amlodipine,  "dextroamphetamine-amphetamine, dextroamphetamine-amphetamine, [START ON 12/8/2023] dextroamphetamine-amphetamine, [START ON 1/5/2024] dextroamphetamine-amphetamine, lamotrigine, lisinopril, zolpidem, and zolpidem.    Review of patient's allergies indicates:   Allergen Reactions    Abilify [aripiprazole] Other (See Comments)     Weight gain 10 lbs    Remeron [mirtazapine] Other (See Comments)     Increased hunger and felt disoriented    Saphris [asenapine] Other (See Comments)     Muscle stiffness    Zoloft [sertraline] Other (See Comments)     Sex dysfunction (anorgasmia)    Zyprexa [olanzapine] Other (See Comments)     sluggish and emotionally flat         Mental Health History     Mental Health History: Mr. Casanova has struggled with anxiety and depression as long as he can recall. He reported being hospitalized in the 5th grade for stomach pain that may actually have been depression and anxiety related. He was first prescribed a medication for mood approximately 25 years ago, and first diagnosed with bipolar disorder 20 years ago. He reported that AA, meditation, and prayer are helpful in being proactive about managing depression.     Family Mental Health History: Mr. Casanova discussed a history of anxiety in his dad and paternal grandmother.     Assessment of Risk: Mr. Casanova reported a past suicide attempt most recently 21 years ago via overdose.     Substance Use:  Alcohol:  He reported a history of heavy use, approximately 750 mL per day of hard liquor on average per day with heavier use on the weekends and lighter use on the weekdays. He quit 4 years ago, on 09/11/2019 after a 2.5-year period of this level of use. He had been sober 9 years prior to that.   Tobacco: He uses a vape but no longer dips or smokes, over the last six months.   Recreational drugs: He reported a history of primary crack cocaine use for a "couple of years" ceasing 15 years ago along with polysubstance abuse.     Social History "     Educational/ Linguistic History  Language:Mr. Casanova's first language is English.   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 He did well in math and science.   Education trajectory: He did not report a history of attention problems, learning difficulties, or academic supports.    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    Work status: He continues to work in their normal capacity.     Service: [employer] [id number]    Living/Social History  Born/raised:  [place].   Current living situation:  He lives between [place] for work and [place] with his wife.   Social support:  He reported strong social supports.       Behavioral Observations     Appearance:  Mr. Casanova arrived on time for his appointment and was accompanied by their wife . He was well dressed; well groomed; and appeared his stated age. Eye contact was within normal limits.    Gait/ Motor: No fine or gross motor abnormalities were observed. Gait was within normal limits.      Sensory: Vision and hearing were adequate for testing.    Speech/ language: Speech was normal in rate, volume, tone, and prosody. Receptive language appeared generally intact. Expressive language appeared generally intact.     Attention and Orientation: Mr. Casanova appeared alert and was oriented to person, place, time, and situation.     Thought processes: Mr. Vega thought processes appeared logical, sequential, and goal oriented. There was no evidence of hallucination or delusions. Insight and judgment appeared intact.    Mood/affect:  Rapport was easy to establish and maintain. His affect was broad in range, varied from euthymic to anxious, to sad, and was appropriate to situation. Affect remained mood-congruent throughout our disucssion.       Billing Documentation     Time spent conducting face to face interview with the patient: 78 minutes; [id number].    Referral Diagnoses:  F31.9 (ICD-10-CM) - Bipolar 1 disorder, depressed   F90.2 (ICD-10-CM) - ADHD (attention deficit hyperactivity disorder), combined type   R41.840 (ICD-10-CM) - Impaired concentration       Signatures     Thank you for the opportunity to assist in the care of your patient. Please do not hesitate to contact me at [phone] or via Epic staff message if I can be of further assistance.          _____________________  Brock Shi, Ph.D.  Neuropsychologist  Department of Neuropsychology  [facility], [place]

## 2023-11-28 LAB — RPR SER QL: NORMAL

## 2023-12-01 LAB — VIT B1 BLD-MCNC: 55 UG/L (ref 38–122)

## 2023-12-20 ENCOUNTER — OFFICE VISIT (OUTPATIENT)
Dept: PSYCHIATRY | Facility: CLINIC | Age: 53
End: 2023-12-20
Payer: COMMERCIAL

## 2023-12-20 ENCOUNTER — HOSPITAL ENCOUNTER (OUTPATIENT)
Dept: RADIOLOGY | Facility: HOSPITAL | Age: 53
Discharge: HOME | End: 2023-12-20
Attending: PSYCHIATRY & NEUROLOGY
Payer: COMMERCIAL

## 2023-12-20 DIAGNOSIS — F31.9 BIPOLAR 1 DISORDER, DEPRESSED: Primary | ICD-10-CM

## 2023-12-20 DIAGNOSIS — Z63.9 FAMILY DYNAMICS PROBLEM: ICD-10-CM

## 2023-12-20 DIAGNOSIS — G47.00 INSOMNIA, UNSPECIFIED TYPE: ICD-10-CM

## 2023-12-20 DIAGNOSIS — F17.220 NICOTINE DEPENDENCE, CHEWING TOBACCO, UNCOMPLICATED: ICD-10-CM

## 2023-12-20 DIAGNOSIS — R25.1 TREMOR: ICD-10-CM

## 2023-12-20 DIAGNOSIS — F10.11 ALCOHOL ABUSE, IN REMISSION: ICD-10-CM

## 2023-12-20 DIAGNOSIS — F40.10 SOCIAL ANXIETY DISORDER: ICD-10-CM

## 2023-12-20 DIAGNOSIS — F14.10 COCAINE ABUSE: ICD-10-CM

## 2023-12-20 DIAGNOSIS — F90.2 ADHD (ATTENTION DEFICIT HYPERACTIVITY DISORDER), COMBINED TYPE: ICD-10-CM

## 2023-12-20 DIAGNOSIS — R41.3 OTHER AMNESIA: ICD-10-CM

## 2023-12-20 PROCEDURE — 70551 MRI BRAIN WITHOUT CONTRAST: ICD-10-PCS | Mod: 26,,, | Performed by: RADIOLOGY

## 2023-12-20 PROCEDURE — 99215 PR OFFICE/OUTPT VISIT, EST, LEVL V, 40-54 MIN: ICD-10-PCS | Mod: 95,,, | Performed by: PSYCHIATRY & NEUROLOGY

## 2023-12-20 PROCEDURE — 70551 MRI BRAIN STEM W/O DYE: CPT | Mod: 26,,, | Performed by: RADIOLOGY

## 2023-12-20 PROCEDURE — 70551 MRI BRAIN STEM W/O DYE: CPT | Mod: TC,PN

## 2023-12-20 PROCEDURE — 99215 OFFICE O/P EST HI 40 MIN: CPT | Mod: 95,,, | Performed by: PSYCHIATRY & NEUROLOGY

## 2023-12-20 RX ORDER — DEXTROAMPHETAMINE SACCHARATE, AMPHETAMINE ASPARTATE, DEXTROAMPHETAMINE SULFATE AND AMPHETAMINE SULFATE 3.75; 3.75; 3.75; 3.75 MG/1; MG/1; MG/1; MG/1
15 TABLET ORAL DAILY
Qty: 30 TABLET | Refills: 0 | Status: SHIPPED | OUTPATIENT
Start: 2023-12-22 | End: 2024-01-21

## 2023-12-20 RX ORDER — LAMOTRIGINE 200 MG/1
200 TABLET ORAL DAILY
Qty: 30 TABLET | Refills: 3 | Status: SHIPPED | OUTPATIENT
Start: 2023-12-20 | End: 2024-03-14 | Stop reason: SDUPTHER

## 2023-12-20 RX ORDER — DEXTROAMPHETAMINE SACCHARATE, AMPHETAMINE ASPARTATE, DEXTROAMPHETAMINE SULFATE AND AMPHETAMINE SULFATE 3.75; 3.75; 3.75; 3.75 MG/1; MG/1; MG/1; MG/1
15 TABLET ORAL DAILY
Qty: 30 TABLET | Refills: 0 | Status: SHIPPED | OUTPATIENT
Start: 2024-02-19 | End: 2024-03-14

## 2023-12-20 RX ORDER — ZOLPIDEM TARTRATE 5 MG/1
5 TABLET ORAL NIGHTLY PRN
Qty: 30 TABLET | Refills: 3 | Status: SHIPPED | OUTPATIENT
Start: 2023-12-22 | End: 2024-03-14 | Stop reason: SDUPTHER

## 2023-12-20 RX ORDER — DEXTROAMPHETAMINE SACCHARATE, AMPHETAMINE ASPARTATE, DEXTROAMPHETAMINE SULFATE AND AMPHETAMINE SULFATE 3.75; 3.75; 3.75; 3.75 MG/1; MG/1; MG/1; MG/1
15 TABLET ORAL DAILY
Qty: 30 TABLET | Refills: 0 | Status: SHIPPED | OUTPATIENT
Start: 2024-01-19 | End: 2024-02-23

## 2023-12-20 SDOH — SOCIAL DETERMINANTS OF HEALTH (SDOH): PROBLEM RELATED TO PRIMARY SUPPORT GROUP, UNSPECIFIED: Z63.9

## 2023-12-20 NOTE — PATIENT INSTRUCTIONS
PLAN:   Follow up  12/27/2023  8:30 AM EVALUATION [facility] - Neuropsych NEUROPSYCH TESTING, SAMUEL    Location Instructions:   2nd Floor       3/14/2024 10:00 AM ESTABLISHED PATIENT - VIRTUAL [facility] - Psych [facility] 4th Fl  Arrive at: Telehealth Post, Kyree WALTERS MD     Discussed his following up via me vs Telly pastrana prescriber; at present mutual ok to continue with me at present     Meds:  now all from [facility]     mutually agreed to move to adderall 15 mg twice daily  (equating to 30 mg total daily) #60 / 1 month x 2 refills //  Historically: he did move down  to a single Adderall XR 30 mg down from taking TWO Adderall XR 30 mg (having been referred in from Dr Segovia in past)  / for supply chain and also flexibility on possible further tapering down     Renewed  (Lamictal 200 mg #30 x3) / Stop IF any [facility], Psmeche SCHNEIDER    ambien  has been down to 5 mg from  proir 10 mg #30x 3     Psyc MD placing referrals:    Follow UP Janet Shi PhD / team /  re neuropsych testing    Follow up [facility] Dr Javed re imaging and appts     Continues attending AA as doing ; if need arises to return to counseling [phone] / (note: previously saw Chris Alberto LCSW)    References: (reviewed with pt as well):    Anxiety &  phobia workbook by JULIANO Espitia PhD  (web retailers: used: $ 7-10)    Relaxation stress reduction workbook: ALFA Lal PhD ( used: $7-10)    Feeling Good Website: Kyree Sweeney MD / [email or web address] [email or web address] website (free) PODCASTS    [place]: Path to Better Sleep : https://www.veterantraining.[place].gov/insomnia/ (free)     Pt expressed appreciation for the visit today and did not have further question at this time though pt  was still informed to:     Call  if problems.    Call / Report Side Effects to Psyc MD     Encouraged to follow up with primary care / Gen Med MD for continued monitoring of general health and wellness.    Understanding was expressed; and no further concerns nor questions  were raised at this time.     remember healthy self care:   eat right  attempt adequate rest   HANDWASHING / encourage such paris. During this corona virus time   walk or light exercise within reason and as your general med team approves  read or explore any of reference materials / homework mentioned  reach out (I.e.,  connect with)  others who nuture and bring out best in you  avoid risky behaviors  keep your appointments  IF you  cannot make your appt THEN please call or go online to reschedule.  avoid  alcohol and illicit substances.  Look for the positive.  All is often relative-seek balance  Call sooner if needed : [phone]   Call 911 or go to Emergency Room  (ER)  if any acute concerns

## 2023-12-20 NOTE — PROGRESS NOTES
Andrei Casanova   1970 12/20/2023     Disclaimer: Evaluation and treatment is based on information presented to date. Any new information may affect assessment and findings.    The patient location is: Patient's home   and reported  that his home in [place], [place]    Visit type: Virtual visit with synchronous audio and video     Each patient to whom he or she provides medical services by telemedicine is: (1) informed of the relationship between the physician and patient and the respective role of any other health care provider with respect to management of the patient; and (2) notified that he or she may decline to receive medical services by telemedicine and may withdraw from such care at any time.    Patient was informed that I am a physician who is licensed in the [facility]:  Kyree Bhatt MD:  Employed by   [facility]     If technology issues arise: YUAN Bhatt MD will attempt to call pt back th pt also instructed that he/she may  call our office phone at: [phone]    Pt informed that if he / she is ever in crisis (or has acute concerns): pt is instructed to Dial 911 or go to nearest Emergency Room (ER)    Pt informed that if questions related to privacy practices: pt is instructed to contact [employer] Information Department: [phone]    Understanding Expressed. No questions.    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 ; history alcohol and substance use; reports speaks to [place] daily ; reports substance and alcohol free    Who (in attendance) :  Pt himself     12-20-23 : 45 min     Note: asks for all meds to [facility]     S: Patient's Own Perception of Condition (& Side Effects) : reports more forgetful ; reports tremor resolved (12-20-23)    O:     Says been doing pretty well but did have an event where he got a bit self entitled bit self-centered caused attention with his wife but all is resolved     Says he and she prayed together  recently    Says he is down to just Adderall 15 mg daily ; in times past says he was up to 90 mg of Adderall with . 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 Feeling nervous, anxious, or on edge? 1 2 2   2. Not being able to stop or control worrying? 1 0 2   3. Worrying too much about different things? 1 0 2   4. Trouble relaxing? 0 1 2   5. Being so restless that it is hard to sit still? 0 0 1   6. Becoming easily annoyed or irritable? 2 0 1   7. Feeling afraid as if something awful might happen? 0 0 1   8. If you checked off any problems, how difficult have these problems made it for you to do your work, take care of things at home, or get along with other people? 0 1 1   MATTHEW-7 Score 5 3 11             12/20/2023     3:19 PM 10/25/2023     8:51 AM 9/14/2023     9:46 AM 6/19/2023     9:54 AM 1/23/2023     9:59 AM 8/29/2022     9:54 AM 6/13/2022     5:09 PM   Depression Patient Health Questionnaire   Over the last two weeks how often have you been bothered by little interest or pleasure in  doing things Not at all Not at all Not at all Not at all      Over the last two weeks how often have you been bothered by feeling down, depressed or hopeless More than half the days Several days Several days Several days      PHQ-2 Total Score 2 1 1 1      Over the last two weeks how often have you been bothered by trouble falling or staying asleep, or sleeping too much Not at all Not at all Not at all Not at all      Over the last two weeks how often have you been bothered by feeling tired or having little energy Not at all Several days Not at all Several days      Over the last two weeks how often have you been bothered by a poor appetite or overeating Not at all Not at all Not at all Not at all      Over the last two weeks how often have you been bothered by feeling bad about yourself - or that you are a failure or have let yourself or your family down Not at all Not at all Not at all Not at all      Over the last two weeks how often have you been bothered by trouble concentrating on things, such as reading the newspaper or watching television More than half the days More than half the days Several days Not at all      Over the last two weeks how often have you been bothered by moving or speaking so slowly that other people could have noticed. Or the opposite - being so fidgety or restless that you have been moving around a lot more than usual. Several days Not at all More than half the days Not at all      Over the last two weeks how often have you been bothered by thoughts that you would be better off dead, or of hurting yourself Not at all Not at all Not at all Not at all      If you checked off any problems, how difficult have these problems made it for you to do your work, take care of things at home or get along with other people? Somewhat difficult Somewhat difficult Somewhat difficult Somewhat difficult      PHQ-9 Score 5 4 4 2      PHQ-9 Interpretation Mild Minimal or None Minimal or None Minimal or None           Information is confidential and restricted. Go to Review Flowsheets to unlock data.       Saw Neurology  for  mild intermittent tremor rae / Tegan SCHNEIDER placed order for imaging referral neuropsych testing     Had reported  memory / more forgetful / says forgetfulness predates adderall dose.    In past / was referred in from Dr VENANCIO Segovia on Adderall XR 30 mg TWO daily (says krisr to that at one point had been up to 90mg daily) / over course treatment here  / was able move down to  adderall XR 30 mg ONCE daily Tho mutually agreed to move to adderall 15 mg twice daily  (equating to 30 mg total daily) #60 / 1 month x 2 refills // as of Dec 15 2023 says he is down to adderall 15 mg adily (1 tab)    He did have initial meeting with Brock Shi PhD     Also tho he has had some lifestyle adjustments as now  since June 2023 to [place] who lives [facility] and he stays [facility] 3 days week and with her 2 days week / says deneis that such is impacting his concentration     He had been giving report after med check report x MULTIPLE MONTHS / that was doing well on regimen.    3-4 months ago  he was even given promotion at work / he felt  stress level went down / tho saying that there are times when he does not  recall what was  spoken  about at meeting    Continues working going   [employer] plant in [facility] area / deneis any safety issues / also notes works with team. Have noted to him: IF any situation of risk then he would need as for leave while further reviewed tho deneis that situation applies. 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    Says continues to attend AA 2-3 time a weeks    Says looking at retiring around   to ; reports that he has received 3 raises in 12 months which favorably affects his CHCF as well    mother passed  ;  dad remains living in [place]; getting along ok    Safety:no SI / no HI     Supports: enjoys his daughter ; she near graduation in  [employer] recently; (he is concerned about her seizure history)     Work / School     Activities tends like more solitarty activities / notes:  some social avoidant    Staying "Sober / "clean"  (I.e., Substance issue): says has sponsor / and participates in AA 2-3 x a week    Constitutional Health Concerns: forgetful > referral placing to neuropsyc ; tremor lip ; referring to neurology [facility]       Patient Active Problem List   Diagnosis    ADD (attention deficit disorder)    Bipolar affective    HTN (hypertension)    Alcohol abuse, in remission    Chews tobacco regularly    Mixed hyperlipidemia    Colon cancer screening    Special screening for malignant neoplasms, colon    Family history of colonic polyps    Bipolar 1 disorder, depressed    Social anxiety disorder    ADHD (attention deficit hyperactivity disorder), combined type    Cocaine abuse; full remission x years    Nicotine dependence, chewing tobacco, uncomplicated    Insomnia    Grief: Mom  Covid Dec 2020    Erythrocytosis    Family dynamic issues : dad's health, 16 yr old daughter living with ex wife / has seizures, other (see  notes)    Prostatitis    Self reports Impaired concentration (mild)    Tremor/ intermittent lips          Current Outpatient Medications:     amLODIPine (NORVASC) 5 MG tablet, TAKE 1 TABLET BY MOUTH DAILY, Disp: 10 tablet, Rfl: 0    [START ON 2023] dextroamphetamine-amphetamine (ADDERALL) 15 mg tablet, Take 1 tablet " (15 mg total) by mouth once daily., Disp: 30 tablet, Rfl: 0    [START ON 2024] dextroamphetamine-amphetamine (ADDERALL) 15 mg tablet, Take 1 tablet (15 mg total) by mouth once daily., Disp: 30 tablet, Rfl: 0    [START ON 2024] dextroamphetamine-amphetamine (ADDERALL) 15 mg tablet, Take 1 tablet (15 mg total) by mouth once daily., Disp: 30 tablet, Rfl: 0    lamoTRIgine (LAMICTAL) 200 MG tablet, Take 1 tablet (200 mg total) by mouth once daily. STOP all Lamictal IF any RASH and tell Psyc MD, Disp: 30 tablet, Rfl: 3    lisinopriL 10 MG tablet, TAKE 1 TABLET BY MOUTH DAILY, Disp: 10 tablet, Rfl: 0    [START ON 2023] zolpidem (AMBIEN) 5 MG Tab, Take 1 tablet (5 mg total) by mouth nightly as needed (INSOMNIA)., Disp: 30 tablet, Rfl: 3     Social History     Tobacco Use   Smoking Status Former    Current packs/day: 0.00    Average packs/day: 1 pack/day for 15.0 years (15.0 ttl pk-yrs)    Types: Cigarettes    Start date: 1994    Quit date: 2009    Years since quittin.8   Smokeless Tobacco Never        Review of patient's allergies indicates:   Allergen Reactions    Abilify [aripiprazole] Other (See Comments)     Weight gain 10 lbs    Remeron [mirtazapine] Other (See Comments)     Increased hunger and felt disoriented    Saphris [asenapine] Other (See Comments)     Muscle stiffness    Zoloft [sertraline] Other (See Comments)     Sex dysfunction (anorgasmia)    Zyprexa [olanzapine] Other (See Comments)     sluggish and emotionally flat      Impulse Control: no history SI / nor HI    Mental Status Exam:   Appearance: casual   Oriented: x 3  Attitude: cooperative   Eye Contact: good   Behavior: calm   Mood: ok  Cognition: alert   Affect: appropriate range   Anxiety: mild  Thought Process: goal directed   Speech: Volume : WNL   Quantity WNL   Quality: appears to openly answer questions   Eye Contact: good   Threats: no SI / no HI   Psychosis: denies all   Estimate of Intellectual Function: above  average     ASSESSMENT:   Encounter Diagnoses   Name Primary?    Bipolar 1 disorder, depressed (Jan 2023: doing well on current med regimen (incl Lamictal 200 mg daily) Yes    ADHD (attention deficit hyperactivity disorder), combined type     Social anxiety disorder     Cocaine abuse; full REMISSION x years     Tremor/ intermittent lips     Insomnia, unspecified type     Family dynamic issues : dad's health, 16 yr old daughter (living with ex wife) has seizures, other (see  notes)     Alcohol abuse, in FULL REMISSION sober 1 year 9-; in past 1/5 th a day      Nicotine dependence, chewing tobacco, uncomplicated          Patient Instructions     PLAN:   Follow up  12/27/2023  8:30 AM EVALUATION [facility] - Neuropsych NEUROPSYCH TESTING, SAMUEL    Location Instructions:   2nd Floor       3/14/2024 10:00 AM ESTABLISHED PATIENT - VIRTUAL [facility] - [facility] 4th Fl  Arrive at: Telehealth Post, Kyree WALTERS MD     Discussed his following up via me vs Telly pastrana prescriber; at present mutual ok to continue with me at present     Meds:  now all from [facility]     mutually agreed to move to adderall 15 mg twice daily  (equating to 30 mg total daily) #60 / 1 month x 2 refills //  Historically: he did move down  to a single Adderall XR 30 mg down from taking TWO Adderall XR 30 mg (having been referred in from Dr Segovia in past)  / for supply chain and also flexibility on possible further tapering down     Renewed  (Lamictal 200 mg #30 x3) / Stop IF any Rash and Infirm, Psyc MD    ambien  has been down to 5 mg from  proir 10 mg #30x 3     Psyc MD placing referrals:    Follow UP Janet Shi PhD / team /  re neuropsych testing    Follow up [facility] Dr Javed re imaging and appts     Continues attending AA as doing ; if need arises to return to counseling [phone] / (note: previously saw Chris Alberto LCSW)    References: (reviewed with pt as well):    Anxiety &  phobia workbook by JULIANO Espitia PhD   (web retailers: used: $ 7-10)    Relaxation stress reduction workbook: ALFA Lal PhD ( used: $7-10)    Feeling Good Website: Kyree Sweeney MD / [email or web address] website (free) PODCASTS    [place]: Path to Better Sleep : https://www.veterantraining.va.gov/insomnia/ (free)     Pt expressed appreciation for the visit today and did not have further question at this time though pt  was still informed to:     Call  if problems.    Call / Report Side Effects to Elisabeth SCHNEIDER     Encouraged to follow up with primary care / Gen Med MD for continued monitoring of general health and wellness.    Understanding was expressed; and no further concerns nor questions were raised at this time.     remember healthy self care:   eat right  attempt adequate rest   HANDWASHING / encourage such paris. During this corona virus time   walk or light exercise within reason and as your general med team approves  read or explore any of reference materials / homework mentioned  reach out (I.e.,  connect with)  others who nuture and bring out best in you  avoid risky behaviors  keep your appointments  IF you  cannot make your appt THEN please call or go online to reschedule.  avoid  alcohol and illicit substances.  Look for the positive.  All is often relative-seek balance  Call sooner if needed : [phone]   Call 911 or go to Emergency Room  (ER)  if any acute concerns    >>BACKGROUND from admit elisabeth Romero Sept 2020 <<     Andrei collazo 53 y.o. Bipolar (depressed)  male presents today as [facility]    Was seeing Carlos Barber MD for 8-10 yrs;he retired. 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 to grad May 2022; she has seizures migrane deperssion / anxiety    Says of recent he has been stable on meds. Dr Javed has continued on meds as from Dr Segovia    Pt has been maintained on:  on lamictal 200 mg (no rash)   adderall XR 30   ambien  5mg    Sober x 1 yr as of 9- via AA; prior to that 9 and 1/2 yr after relapsed    Says has sponsor    Says 'to be up front have had substance issues: alcohol and cocaine     Social anxiety ' big struggle for long time' ; more recently though time leave house; always struggle; people who not know think I outgoing; tho says push through it; makes feel present someone I am not    prior Juliette SCHNEIDER: tried paxil zoloft tho no clear recall of how did: interest to try /    Never abilify jack    Says he did Tried effexor XR (75 mg) difficult sex side effect;  Denies any suicidal ideation / nor gesture / nor any thoughts to harm other     No psychosis    Self Rating Scales: (Such submitted for scanning) :     Quick Inventory of Depression (QID-Short Form):21  Zung Anxiety Index:78   Mood Disorder Questionnaire (MDQ): 8  The [employer] Framework: a "bio-psycho-social" screening form for use as clinical raw data. / (submitted for scanning)      World Health Organization (W.H.O.) Adult ADHD Self Report Scale (ASRS-henrique 1.1):  5 of 6 core and 6 of 12 adjunctive    PAST PSYCHIATRIC HISTORY:     Inpatient: " "n  Outpatient: (incl. 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    When revert would isolate ; did for release ; drug choice was cocaine    Alcohol: last 9-8 2020;   Start: 10y  regular use: 12y UNTIL decaon (1st period sober) when left start back drinking  At 12y: every weekend: beer 6 12 oa  16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 / 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; 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 (2-6 beer)    Blackouts: deneis   No seizure:    Relationships: when drink / isolate / fear of work consequence IF they learned     Was drinking 5th a day (3 yr ago; "drank hard = 5th a day"); until passed out; had get up 4 a or 4;30a ; in fear breathalizer; combat by get [employer]     After deacon daily and cocaine    Went rehab: [facility] 1 month 2008 ; 2 months prior: in [facility]: 21 days (out pocket private); none other    When went [place], self report drugs and alcohol; they said do not worry; when got back ; was sent psychiatrist pradip said lied on questionairre as he said not drinknig so terminated    2 and 1/2 yrs later: he contact [facility] ; says was wrongfully terminated as self report; go job back    Says for 1st 2 y back weekly drug test and random; and employer said go see victoriano SCHNEIDER and go to counselor ; NOW just random.    Cannabis: 5th grade 1st  Try; 8th grade : at least 1x week; daily use: 16y until 18y then when came across    Tobacco:former ; stopped 2008 / start 21 yrs " olf  Cocaine:years ago   Benzo: n  Opioid: n  Ecstasy:n  Other:n     Grandparent: Drug problems brother drug problem   Parents uncles / depression   Daughter depression    Mother and aunt history significant anxiety     3 wishes? :  Serenity, comfortable in skin , (20y) daughter not have seizures or migranes     PSYCHO-SOCIAL DEVELOPMENT HISTORY:     City Born: [facility]    Siblings (full or half)  Brothers: 1 older 2 1/2   Sisters: none    Parents : alive     Briefly Describe  your Mom: poor health controlling   Briefly Describe your Dad: poor health unhappy anxious    Bio Mom: Occupation:    Bio Dad:  Occupation: car sales     Marital Status:  / x2 to same lady (15 y / 1995); 2 1/2 y [place] ([place]) ; speak frequently ; she RN school     Children   Girls  (ages): 1 daughter 20 y: seizures migraines vertigo / depression anxiety / [employer] / 2nd yr journalism  Boys (ages):     Physical Abuse: N    Sexual abuse  N    Other trauma / abuse? 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 n

## 2023-12-27 ENCOUNTER — OFFICE VISIT (OUTPATIENT)
Dept: NEUROLOGY | Facility: CLINIC | Age: 53
End: 2023-12-27
Payer: COMMERCIAL

## 2023-12-27 DIAGNOSIS — F90.2 ADHD (ATTENTION DEFICIT HYPERACTIVITY DISORDER), COMBINED TYPE: ICD-10-CM

## 2023-12-27 DIAGNOSIS — F40.10 SOCIAL ANXIETY DISORDER: ICD-10-CM

## 2023-12-27 DIAGNOSIS — R41.840 IMPAIRED CONCENTRATION: ICD-10-CM

## 2023-12-27 DIAGNOSIS — F14.21 COCAINE USE DISORDER, MODERATE, IN SUSTAINED REMISSION: ICD-10-CM

## 2023-12-27 DIAGNOSIS — F31.9 BIPOLAR 1 DISORDER, DEPRESSED: Primary | ICD-10-CM

## 2023-12-27 DIAGNOSIS — F10.21 ALCOHOL USE DISORDER, MODERATE, IN SUSTAINED REMISSION, DEPENDENCE: ICD-10-CM

## 2023-12-27 DIAGNOSIS — F41.1 GAD (GENERALIZED ANXIETY DISORDER): ICD-10-CM

## 2023-12-27 PROCEDURE — 96133 NRPSYC TST EVAL PHYS/QHP EA: CPT | Mod: S$GLB,,, | Performed by: STUDENT IN AN ORGANIZED HEALTH CARE EDUCATION/TRAINING PROGRAM

## 2023-12-27 PROCEDURE — 96132 NRPSYC TST EVAL PHYS/QHP 1ST: CPT | Mod: S$GLB,,, | Performed by: STUDENT IN AN ORGANIZED HEALTH CARE EDUCATION/TRAINING PROGRAM

## 2023-12-27 PROCEDURE — 99999 PR PBB SHADOW E&M-EST. PATIENT-LVL II: CPT | Mod: PBBFAC,,, | Performed by: STUDENT IN AN ORGANIZED HEALTH CARE EDUCATION/TRAINING PROGRAM

## 2023-12-27 PROCEDURE — 96138 PSYCL/NRPSYC TECH 1ST: CPT | Mod: S$GLB,,, | Performed by: STUDENT IN AN ORGANIZED HEALTH CARE EDUCATION/TRAINING PROGRAM

## 2023-12-27 PROCEDURE — 99499 UNLISTED E&M SERVICE: CPT | Mod: S$GLB,,, | Performed by: STUDENT IN AN ORGANIZED HEALTH CARE EDUCATION/TRAINING PROGRAM

## 2023-12-27 PROCEDURE — 96139 PSYCL/NRPSYC TST TECH EA: CPT | Mod: S$GLB,,, | Performed by: STUDENT IN AN ORGANIZED HEALTH CARE EDUCATION/TRAINING PROGRAM

## 2023-12-28 ENCOUNTER — PATIENT MESSAGE (OUTPATIENT)
Dept: NEUROLOGY | Facility: CLINIC | Age: 53
End: 2023-12-28
Payer: COMMERCIAL

## 2024-01-03 NOTE — PROGRESS NOTES
CONFIDENTIAL NEUROPSYCHOLOGICAL EVALUATION    NAME:  Andrei Casanova DATE OF SERVICE: 2023   MRN#:  [id number] EDUCATION: 12   AGE: 53 y.o. HANDEDNESS: Right    : 1970 RACE: White   SEX: Male REFERRAL: Kyree Bhatt MD;  Psychiatry, [facility]     Referral question and neuropsychological necessity: Mr. Casanova is a 53 y.o., right-handed, white male with 12 years of formal education who was referred by his psychiatrist due to cognitive concerns in the context of bipolar 1 disorder and ADHD.    Evaluation methods: I had the pleasure of seeing Andrei Casanova on 2023 in person at the [facility], Department of Neurology. Data sources for the below report include a review of available medical records, interview with the patient and his wife on 2023, and administration of a series of neuropsychological tests, listed in the Results section of this report. At the outset of the appointment, the undersigned explained the rationale for the evaluation along with the limits of confidentiality; and verbal informed consent for this evaluation was obtained.      Summary and Impressions     Summary of History:  Mr. Casanova is a 53 y.o., right-handed, White, male with 12 years of formal education. He was referred by his psychiatrist due to cognitive concerns in the context of a history of bipolar 1 disorder most recent episode depressed, ADHD predominately inattentive subtype, and new onset of a lip tremor for which he has been referred for movement disorders neurology visit at [facility] ([facility]). Medical history is additionally notable for social anxiety, alcohol and cocaine use disorders in sustained remission; hypertension, hyperlipidemia, and insomnia. Mr. Casanova was seen in neurology by Onel Rodríguez via [facility] on 10/09/2023, at which time consideration was given for uncontrolled psychiatric factors affecting memory vs a neurocognitive process; brain MRI and  laboratory studies for reversible causes of memory loss have been completed on 12/20/2023 and 11/27/2023, respectively, and were within normal limits. EEG and neuropsychological testing were also recommended though EEG has not yet been completed. He completed a CT angiogram on 07/30/2016 due to a family history of aneurysm, which was interpreted as within normal limits.      During interview, Mr. Casanova and his wife reported the insidious onset and slowly worsening course of cognitive concerns beginning approximately one year ago, with noticeable worsening 6 months ago after his father's death. This is superimposed on a period of transient cognitive difficulties following his and his family's yue COVID-19 in 12/2020 and the subsequent deaths of his mother and brother in 01/2021 due to COVID-19. At this time, he and his wife characterized difficulties with word-finding and memory for recent information that he could not better attribute to anxiety, work-related changes, or other identifiable factors. He and his wife reported that he has recently reduced his dosage of Adderall, which has improved anxiety and subsequently led to improvements in attention and concentration, executive functions, and his social, interpersonal functioning. Mr. Casanova denied changes in functional independence related to his above cognitive concerns. He continues to work full-time following a recent promotion.      Emotionally, Mr. Casanova discussed current clinically significant anxiety and denied other mood concerns. He reported a history of depression and yves, as well as past suicide attempt but denied any current or recent symptoms suggesting increased risk of harm to himself, for a major depressive episode, or for yves. Anxiety appears to present both as generalized anxiety disorder as detailed below; but also with significant social anxiety that is independent of his more generalized pattern of worry. He and his wife  "discussed that symptoms of anxiety and grief have been exacerbating factors in his cognitive concerns. For example he reported that although word-finding errors occur independently of anxiety, the anxiety that these errors produce in social or work environments in particular subsequently worsen expressive language skills. Consistent with his available records, Mr. Casanova discussed a history of both alcohol and cocaine use disorders, currently in sustained remission outside of a controlled environment.    Test Results:    After testing had concluded, Mr. Casanova independently messaged with helpful information about his performances that were consistent with the independent observations of our examiner, and informed me that his below anxiety screening questionnaire may underestimate his true level of anxiety. Specifically, he sent the following additional information of clinical relevance:     "Over the last few months when my wife and I have a disagreement I get extremely anxious. I physically shake, heart races and shortness of breath. I have a very hard time expressing myself. I cant find words or remember things that were just said that should be discussed.      I noticed yesterday when I became anxious when your nurse ... read me stories for me to repeat I had a similar feeling but not quite as strong. I could not remember much of the stories."    [place] Findings:   Mr. Casanova is a man of at least Average baseline cognitive functioning on the basis of his performance on tests typically insensitive to neurologic insult.  Current intellectual functioning is high average, with a considerable personal and normative strength for perceptual skills and normal-range personal inefficiency across an index of attention and working memory tasks.  Performances in the following areas were within normal limits, suggesting intact cognitive functioning in relevant domains:  Attention and working memory  Information processing " speed  Expressive language  Visuospatial skills  Executive functions (normative strengths across verbal set-shifting and verbal and visual abstract reasoning skills)  Encoding and memory for visual information (significant normative strength)  Learning and memory for rote verbal information with inefficient encoding and marked benefit from reminder cueing (normative strength)  Motor dexterity and motor speed  Performances in the following areas were below normal limits, suggesting weakness or decline in relevant domains:  Encoding and free recall of structured verbal information with a tendency to endorse semantically similar items on recognition; and report and observation that anxiety affected performance on this test specifically.  On screening measures of depression and anxiety, Mr. Casanova endorsed minimal, I.e. sub-clinical depression and mild anxiety. However, he disclosed to me after completion of testing that his responses on this anxiety inventory were an underestimate of his true level of anxiety.    Data Synthesis: Cognitive test performances are most notable for considerable personal and normative strengths, and an isolated low score on a learning and memory test affected by anxiety. His pattern of subtly personal inefficiencies on attention and working memory tasks and encoding difficulty on a verbal list-learning test are also expected sequelae of his history of ADHD and anxiety; and do not clearly indicate neurocognitive dysfunction.     Diagnostic/ Etiologic Considerations: Cognitive test results, responses on self-report inventories, behavioral observations, and Mr. Casanova's reported experiences with testing support suspected interview diagnoses of Social Anxiety Disorder, Generalized Anxiety Disorder, and well-controlled bipolar 1 disorder most recent episode depressed; as well as his history of ADHD. These factors are the most-parsimonious explanation for the subtle cognitive inefficiencies  above in the context of a bright high-functioning person. While there was concern after interview that his alcohol use history put him at increased risk of alcohol-related neurocognitive dysfunction his strengths across executive and visuospatial skills in particular greatly reduce my concern for alcohol-related neurocognitive dysfunction.     Clinical Considerations: Although Mr. Casanova performed well across neuropsychological tests, suggesting against central nervous system dysfunction. This does not preclude day-to-day difficulties with thinking skills. Testing took place in a quiet, distraction-reduced environment while he worked one-on-one with a professional specifically trained to get a person's best performance within the confines of standardized test administration. It is likely that Mr. Casanova as with others would perform more poorly day-to-day in chaotic environment, when distracted, and in particular when under stress. Interventions aimed at reducing the effect of these environmental factors on day-to-day cognition is likely to be of benefit.     Diagnoses  1. Bipolar 1 disorder, depressed (Jan 2023: doing well on current med regimen (incl Lamictal 200 mg daily)  Ambulatory referral/consult to Adult Neuropsychology      2. MATTHEW (generalized anxiety disorder)        3. Social anxiety disorder        4. ADHD (attention deficit hyperactivity disorder), combined type  Ambulatory referral/consult to Adult Neuropsychology      5. Self reports Impaired concentration (mild)  Ambulatory referral/consult to Adult Neuropsychology      6. Alcohol use disorder, moderate, in sustained remission, dependence        7. Cocaine use disorder, moderate, in sustained remission            Plan/ Recommendations     Provider Recommendations:   Mr. Casanova will be encouraged to follow up with his referring provider in order to integrate these findings into the overall context of his clinical care, and to implement any of the  below recommendations as appropriate.     Cognitive test results, brain imaging data, and laboratory studies significantly reduce my concern for a neurocognitive, COVID-related, or metabolic process at this time.    Continued work with psychiatry will be critical moving forward. Test results suggest that anxiety in particular is worsening day-to-day attention, memory, and language performances in particular and is exacerbating baseline ADHD. Continuing to target anxiety may improve cognitive and functional status.    Return to individual psychotherapy is recommended. Given his symptoms, cognitive functioning, and detail-oriented personality Mr. Casanova may be a particularly good fit for CBT from both an evidence-based and therapeutic fit perspective.    In the event that Mr. Casanova has cognitive changes in the future, results from this assessment should serve as a baseline for comparison.       Patient Recommendations:   The next step in your care is to follow up with your referring provider in order to help with continued management of your care. The below recommendations may help you and your family compensate for your difficulties and better understand the reasons for your cognitive changes.    Continue to work with your psychiatry team for ongoing management of anxiety. I agree with the recent changes in your treatment plan and am glad that you reported some preliminary benefit from reducing the dose of your Adderall. Continue to work on finding a balance between management of anxiety and ADHD symptoms because both can and do affect your attention and concentration.    Cognitive test results reduce my level of concern for a neurologic disease process, brain injury, or chemical imbalance as a cause of your cognitive concerns. Continued work on managing symptoms of anxiety may actually improve your performance on cognitive tests and is very likely to improve your day-to-day cognitive functioning.    Targeted  psychotherapy to learn coping skills and get extra support as you deal with your current stressors is appropriate at this time. I particularly recommend Cognitive Behavioral Therapy (CBT) as it has a strong evidence base for treatment of anxiety in particular. Because you are an analytical person this also may be a good fit with your personality and method of addressing problems.    For management of stress/anxiety, I recommend daily practice of relaxation strategies (e.g., deep breathing, progressive muscle relaxation, mindfulness), the following are just a few good examples:   The smartphone peggy [id number] can help guide you through a deep breathing exercise that may help with anxiety.   There are also excellent free videos for guided meditation, muscle relaxation, and mindfulness on [employer] or other video platforms.   I recommend trying some and finding something that works. For any of these skills, they only work if you practice them regularly.   I recommend first choosing one skill and practicing it ten minutes a day when you do not feel anxious or distressed. Then you can use these skills when you need them.     You may additionally benefit from the compensatory strategies on the Cognitive Strategies handout provided to you, including:  Focus on one thing at a time and do not try to multitask.  Break down larger tasks into small, manageable tasks.  Do your most important tasks during the time of day when you feel most alert/awake, and save busy work for times when you have less energy.  Set aside some time each week to plan ahead for the tasks you will need to complete. Use this time to prioritize the tasks and set alarms so you won't forget to complete them before the deadline.  Use alarms, notes, and checklists as needed to keep track of tasks, deadlines, and appointments.  Use an academic planner to keep track of your assignments and tests.  Give your brain regular breaks to recharge.  Try the  ""Pomodoro" technique:  Set a timer for 20 minutes and work hard.  When the timer goes off, take a 5-minute break to do something relaxing (not more work!)  Repeat this sequence four times, then take a longer (20-30 minutes) break to reward yourself.  Adjust these suggested times as needed to match your attention span.     Cognitive test results should serve as a baseline for testing if you experience worsening of thinking skills or functional/ daily living skills in the future.       Presenting concerns      Presenting Problem/Primary Concern: Cognitive changes in the context of the above psychiatric and medical history.     Onset and course of Cognitive Concerns: He discussed that the first time he perceived difficulties was following his yue COVID approximately on 12/26/2020 and the deaths of his mother and his brother from COVID at that time; and he discussed that he returned to his cognitive baseline over the course of six months as symptoms attributed to COVID and grief abated. Beginning approximately one year ago he noticed the insidious onset and progressive course of difficulties with memory for recent information, with an instance of worsening 6 months ago after the death of his father, during he and his wife's honeymoon.      Characterization of Cognitive Concerns  Attention/ working memory: Mr. Casanova has a history of ADHD predominately inattentive subtype. He has reduced his dosage of Adderall from 60mg/ day to 15mg/ day and discussed that due to reduced anxiety, his focus is improved on the lower dose.     Processing speed: Mr. Casanova discussed that with reduced work related demands on quick processing speed he has perceived slowing of processing speed. 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      Language: Mr. Casanova reported difficulties with expressive language, specifically word-finding difficulty and retrieving names.  This is a frustrating symptom for him because " "it affects his ability to communicate with others. He denied paraphasic errors.      Visual-spatial/ navigation: Mr. Casanova denied difficulties with visual-spatial skills or navigation.     Psychomotor/ Sensory: Mr. Casanova discussed jaw tightening and bottom lip quivering that has ceased or drastically reduced since reducing Adderall. He discussed that anxiety produces this symptom. He denied right-left confusion, dysgraphia, or sensory concerns.      Memory: Mr. Casanova discussed difficulties with memory for recent verbal information, e.g. conversations with subtle benefit from reminder cueing. He denied errors in visual memory or navigation.     Decision making: Mr. Casanova denied difficulties in this area except as would be better explained by not retaining details. He discussed improved multitasking since reducing Adderall due to associated improvement in anxiety. He discussed that he is less-often sidetracked or derailed by distractions than in the past.      Behavioral changes: Mr. Casanova and his wife denied problematic behavioral changes. Rather he was described by his wife as less "intense" and high strung than he was prior to reducing Adderall as above.      Orientation: Mr. Casanova denied errors in orientation to person, place, time, or situation.        Current Mental Health  Current mood:  Mr. Casanova described his mood as "good."      Relevant current mood concerns: Mr. Casanova discussed anxiety that occurs "in general" and in social settings. He discussed that perceiving that he is not as sharp as he was creates worry and fear as well. He discussed generalized worry about a multitude of facotrs including his family, plans, scheduling, social events, and work as above. Anxiety is difficult to control, results in tension in his upper back, leads to irritability, and affects sleep due to ruminative thinking.      In addition to generalized anxiety, he reported social anxiety related to his work and how " "his work interfaces with memory and word-finding errors in particular. He discussed that in situations where he may be subject to social scrutiny or perceived judgment anxiety is significantly elevated and often tolerated with considerable difficulty.     Hallucinations and delusions: Mr. Casanova denied experiencing hallucinations and there is no concern for delusional thinking.     Physical Status  Pain: Mr. Casanova discussed lower back pain daily throughout the day. He sometimes is awakened by pain although pain is improved recently. Naproxen works well for him, he does not take it daily. Pain at its worst can be a "10"/ 10 but this has not been the case in quite some time.  Sleep: Mr. Casanova reported an average sleep duration of 6 hours per night, with up to 8 hours at most. He discussed one night per week of laying awake at night after awakening at 02:00. He denied HOMAR symptoms or parasomnia  Energy: Mr. Casanova denied fatigue.   Exercise/ therapy: Mr. Casanova reported that exercise is reduced. He used to do push-ups and sit ups daily, and the eliptical.   Balance/ gait: Mr. Casanova denied a history of gait disturbances.   Falls: Mr. Casanova denied a history of falls.   Sensory changes: Mr. Casanova denied sensory changes.  He has hearing loss and is without hearing aides.      Functional Abilities/Changes: Mr. Casanova reported that he is independent and effective in all basic and instrumental activities of daily living.      Prior Neuropsychological Assessment: Mr. Casanova reported that he has not had prior neuropsychological testing.    Medical History     Relevant past medical history:  Past Medical History:   Diagnosis Date    ADHD (attention deficit hyperactivity disorder)     Bipolar disorder     DVT (deep venous thrombosis)     Family history of colonic polyps 2/25/2019    He states that his father had colon polyps.    Hypertension        Relevant early developmental history: Mr. Casanova denied any " personal history of early life concerns that affected his cognitive functioning or development.     Additional neurological: Mr. Casanova reported low oxygen saturation when he contracted COVID, noting an oxygen saturation in the 70's via his at-home device. He discussed that he presented to the ED, was observed to have improved saturation with mild exertion, and was not admitted to the hospital at that time.    Relevant neuroimaging/ diagnostic studies: (asterisks redacted from outside records for Epic formatting)    Results for orders placed or performed during the hospital encounter of 12/20/23   MRI Brain Without Contrast    Narrative    EXAMINATION:  MRI BRAIN WITHOUT CONTRAST    CLINICAL HISTORY:  Other amnesiaMemory loss;memory loss, please evaluate on any lobar atrophy including comment on any hippocampus atrophy;    TECHNIQUE:  Standard multiplanar noncontrast sequences of the brain.    COMPARISON:  None    FINDINGS:  The ventricles are non-dilated. No definite edema, hemorrhage or mass effect is seen. No extra-axial fluid collection.    The pituitary gland region appears normal.    The gradient echo sequence is negative.    Skull base appears normal.    The diffusion sequence is negative.      Impression    Negative noncontrast enhanced MRI of the brain.      Electronically signed by: Vimal Lira MD  Date:    12/20/2023  Time:    12:06     Outside CTA:  CTA Head     INDICATION: Headache   ha. family hx of aneurysm     COMPARISON: none     TECHNIQUE: Automated exposure control was utilized.     DISCUSSION:     The precontrast head CT demonstrates no acute abnormalities. On the delayed postcontrast head CT, there are no enhancing abnormalities.     Thin section spiral CT acquisition of the brain was obtained in the arterial phase of contrast. Multiplanar MIP reconstructions and 3-D reconstructions were reviewed.     The petrous, cavernous, and supraclinoid internal carotid arteries are patent. The M1  segments are patent. There is normal branching of the M2 segments within the Sylvian fissures. The vertebral arteries are patent. The basilar artery is patent. The P1 segments are patent. There is an A-comm, and the A1 and A2 segments are patent. There is no evidence of intracranial aneurysm.       IMPRESSION:     No evidence of aneurysm or flow limiting stenosis. No acute intracranial abnormalities.   BUN: 16, CREAT 1.04, Was Iodinated IV Contrast Used? (excluding gastroview) Yes, Contrast Given omni 350, Amount 90cc                                             FINAL    Transcribed By:  MAS   Transcribed Date/Time:  30-JUL-2016 18:03                                            Electronically Signed By:  Garett Williamson MD                                          Proxied for:  Garett Williamson MD                                              Signed Date/Time:  30-JUL-2016 18:05      Relevant family history: Mr. Casanova denied a family history of early-onset cognitive decline or relevant neurologic illness in any first-degree relatives.     Current medications: Mr. Casanova has a current medication list which includes the following prescription(s): amlodipine, dextroamphetamine-amphetamine, [START ON 1/19/2024] dextroamphetamine-amphetamine, [START ON 2/19/2024] dextroamphetamine-amphetamine, lamotrigine, lisinopril, and zolpidem.    Review of patient's allergies indicates:   Allergen Reactions    Abilify [aripiprazole] Other (See Comments)     Weight gain 10 lbs    Remeron [mirtazapine] Other (See Comments)     Increased hunger and felt disoriented    Saphris [asenapine] Other (See Comments)     Muscle stiffness    Zoloft [sertraline] Other (See Comments)     Sex dysfunction (anorgasmia)    Zyprexa [olanzapine] Other (See Comments)     sluggish and emotionally flat         Mental Health History     Mental Health History: Mr. Casanova has struggled with anxiety and depression as long as he can recall. He reported being  "hospitalized in the 5th grade for stomach pain that may actually have been depression and anxiety related. He was first prescribed a medication for mood approximately 25 years ago, and first diagnosed with bipolar disorder 20 years ago. He reported that AA, meditation, and prayer are helpful in being proactive about managing depression.      Family Mental Health History: Mr. Casanova discussed a history of anxiety in his dad and paternal grandmother.      Assessment of Risk: Mr. Casanova reported a past suicide attempt most recently 21 years ago via overdose.      Substance Use:  Alcohol:  He reported a history of heavy use, approximately 750 mL per day of hard liquor on average per day with heavier use on the weekends and lighter use on the weekdays. He quit 4 years ago, on 09/11/2019 after a 2.5-year period of this level of use. He had been sober 9 years prior to that.   Tobacco: He uses a vape but no longer dips or smokes, over the last six months.   Recreational drugs: He reported a history of primary crack cocaine use for a "couple of years" ceasing 15 years ago along with polysubstance abuse.      Social History      Educational/ Linguistic History  Language:Mr. Casanova's first language is English.   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 He did well in math and science.   Education trajectory: He did not report a history of attention problems, learning difficulties, or academic supports.     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    Work status: He continues to work in their normal capacity.      Service: [employer] [id number]     Living/Social History  Born/raised:  [place].   Current living situation:  He lives between [place] for work and [place] with his wife.   Social " support:  He reported strong social supports.     Behavioral Observations     Appearance:  Mr. Casanova arrived on time for his appointment and was unaccompanied. He was well dressed; well groomed; and appeared his stated age. Eye contact was appropriate.    Gait/ Motor: No fine or gross motor abnormalities were observed. Gait was within normal limits.      Sensory: Vision and hearing were adequate for testing.    Speech/ language: Speech was normal in rate, volume, tone, and prosody. Receptive language appeared generally intact. Expressive language appeared generally intact.     Attention and Orientation: Mr. Casanova appeared alert and was oriented to person, place, time, and situation.     Thought processes: Mr. Stringers thought processes appeared logical, sequential, and goal oriented. There was no evidence of hallucination or delusions. Insight and judgment appeared intact.    Mood/affect:  Rapport was easy to establish and maintain. His affect was broad in range, appeared anxious, and was consistent with stated mood. Behavior was within normal limits.     Test observations:  During testing, he easily understood and retained task instructions, completed tasks at a quick pace, and tolerated testing without the need for breaks or accommodation. He appeared to place a high level of pressure on himself to perform well and responded with laughter or self-deprecation when testing became difficult for him. In particular, he appeared visibly anxious during the learning trials of a list-learning task and he had both observed (and reported as above) anxiety, with his mind appearing to go blank during one of the two learning trials.    Results     Tests Administered (Manual norms used unless otherwise indicated): [employer] - Test of Premorbid Functioning (TOPF), TOMM, Wechsler Adult Intelligence Scale 4th Ed. (WAIS-IV), Digit Vigilance Test (DVT; [facility] norms), Neuropsychological Assessment Battery (NAB)  Naming, [place] Verbal Learning Test, 3rd Ed. (CVLT-3), Wechsler Memory Scale, 4th Ed. Logical Memory (WMS IV-LM), Wechsler Memory Scale, 4th Ed. Visual Reproduction (WMS IV-VR), Jason-Osterrieth Complex Figure Test (RCFT) Copy trial, Viviana-Whyte Executive Functioning Scale (DKEFS) Color Word Interference, Trails, and Verbal Fluency , Grooved Pegboard Test ([facility] norms), Giron Depression Inventory, 2nd Ed. (BDI-2); and Giron Anxiety Inventory (MUMTAZ).    Score Label T-Score Standard Score Z-Score Scaled Score %ile Rank   Exceptionally High > 70 > 130 > 2.0  > 16 > 98   Above Average 64-69 120-129 1.4-1.9 15 91-97   High Average 57-63 110-119 0.7-1.3 12-14 75-90   Average 44-56  0.6 to -0.6 8-11 25-74   Low Average 37-43 80-89 -1.3 to -0.7 6-7 9-24   Below Average 30-36 70-79 -2.0 to -1.4 4-5 2-8   Exceptionally Low < 30 < 70  < -2.0 < 4 < 2       Performance Validity: Mr. Casanova completed both stand-alone and embedded measures of task engagement. Below-cutoff performance on any one stand-alone measure and/ or any two embedded measures of task engagement is highly unlikely to occur outside the context of poor or inconsistent effort during testing. Mr. Casanova scored above predetermined cutoffs on all administered measures of task engagement. As such, there is no evidence to suggest poor or inconsistent engagement and their performance is deemed to be a reasonable reflection of their day-to-day cognitive status.     INTELLECTUAL FUNCTIONING Raw Score Type of Standardized Score Standardized Score Percentile/CP Descriptor   WAIS-IV         VCI -  82 High Average   MICHAEL -  94 Above Average   WMI -  50 Average   PSI -  84 High Average   FSIQ -  84 High Average   LANGUAGE FUNCTIONING Raw Score Type of Standardized Score Standardized Score Percentile/CP Descriptor   WAIS-IV Information 15 ss 10 50 Average   TOPF Word Reading 49  66 Average   NAB Naming 31 Tscore 54 66 Average   DKEFS  Verbal Fluency: Letter 37 ss 10 50 Average   DKEFS Verbal Fluency: Category 46 ss 13 84 High Average   VISUOSPATIAL FUNCTIONING Raw Score Type of Standardized Score Standardized Score Percentile/CP Descriptor   WAIS-IV Block Design 47 ss 13 84 High Average   RCFT Copy WNL (33/36) - Figure clearly reflects the target stimulus gestalt and internal details, with duplication of one internal element and subtly inefficient outside-in approach of doubtful clinical significance.   RCFT Time to Copy 158 - - >16 WNL   VR Copy 42 - - 26-50 Average   LEARNING & MEMORY Raw Score Type of Standardized Score Standardized Score Percentile/CP Descriptor   CVLT-3         Immediate Recall: Trial 1 Correct 5 ss 8 25 Average   Immediate Recall: Trial 2 Correct 6 ss 6 9 Low Average   Immediate Recall: Trial 3 Correct 8 ss 7 16 Low Average   Immediate Recall: Trial 4 Correct 9 ss 7 16 Low Average   Immediate Recall: Trial 5 Correct 11 ss 9 37 Average   List B Correct 3 ss 6 9 Low Average   Delayed Recall: Short Delay Free Recall Correct 7 ss 7 16 Low Average   Delayed Recall: Short Delay Cued Recall Correct 10 ss 8 25 Average   Delayed Recall: Long Delay Free Recall Correct 8 ss 8 25 Average   Delayed Recall: Long Delay Cued Recall Correct 9 ss 7 16 Low Average   Yes/No Recognition: Total Hits 16 ss 13 84 High Average   Yes/No Recognition: False Positives 15 ss 3 1 Exceptionally Low   Yes/No Recognition: Recognition Discriminability 2 ss 6 9 Low Average   Yes/No Recognition: Recognition Discriminability Nonparametric 69 ss 4 2 Below Average   Recall Errors: Total Intrusions 5 ss 9 37 Average   Forced Choice Recognition: Total Hits 16 ss N/A N/A N/A   Standard Score Summary: Trials 1-5 Correct - SS 85 16 Low Average   Standard Score Summary: Delayed Recall Correct - SS 87 19 Low Average   Standard Score Summary: Total Recall Correct - SS 84 14 Low Average   WMS-IV Subtests         LM I 14 ss 5 5 Below Average   LM II 10 ss 5 5 Below Average    LM Recognition 22 - - 17-25 Low Average   VR I 39 ss 12 75 High Average   VR II 34 ss 16 98 Exceptionally High   VR II Recognition 5 - - 26-50 Average   ATTENTION/WORKING MEMORY Raw Score Type of Standardized Score Standardized Score Percentile/CP Descriptor   WAIS-IV Digit Span 23 ss 8 25 Average         DS Forward 9 ss 9 37 Average         DS Backward 7 ss 8 25 Average         DS Sequence 7 ss 9 37 Average         Longest Digit Forward 6 - - - -         Longest Digit Backward 4 - - - -         Longest Digit Sequence 4 - - - -   WAIS-IV Arithmetic 17 ss 12 75 High Average   DVT Time 281 Tscore 69 97 Above Average   DVT Errors 15 Tscore 38 12 Low Average   MENTAL PROCESSING SPEED Raw Score Type of Standardized Score Standardized Score Percentile/CP Descriptor   WAIS-IV Symbol Search 36 ss 13 84 High Average   WAIS-IV Coding 58 ss 10 50 Average   DKEFS Trails: Visual Scanning 23 ss 10 50 Average   DKEFS Trails: Number Sequencing 29 ss 12 75 High Average   DKEFS Trails: Letter Sequencing  27 ss 13 84 High Average   DKEFS Color-Word: Color Naming 31 ss 10 50 Average   DKEFS Color-Word: Word Reading 22 ss 11 63 Average   EXECUTIVE FUNCTIONING Raw Score Type of Standardized Score Standardized Score Percentile/CP Descriptor   DKEFS Trails: Switching 78 ss 11 63 Average   DKEFS Verbal Fluency: Switching Total 16 ss 14 91 Above Average   DKEFS Verbal Fluency: Switching Accuracy 14 ss 12 75 High Average   DKEFS Color-Word: Inhibition (0 errors) 47 ss 13 84 High Average   DKEFS Color-Word: Inhibition/Switching (0 errors) 51 ss 13 84 High Average   WCST-128 -        Total Correct 83 - - - -   Total Errors 34 SS 90 25 Average   Perseverative Resp. 21 SS 91 27 Average   Perseverative Err. 21 SS 88 21 Low Average   Nonperseverative Err. 13 SS 92 30 Average   Concept. Level Response 76 SS 93 32 Average   Categories Completed 6 - - >16 WNL   FMS 2 - - >16 WNL   Learning to Learn 0.00 - - >16 WNL   WAIS-IV Similarities 33 ss 15 95  Above Average   WAIS-IV Matrix Reasoning 22 ss 15 95 Above Average   FRONTOMOTOR  Raw Score Type of Standardized Score Standardized Score Percentile/CP Descriptor   GPT DH 75 Tscore 43 25 Average   GPD NDH 81 Tscore 46 34 Average   DKEFS Trails: Motor Speed 28 ss 11 63 Average   MOOD & PERSONALITY Raw Score Type of Standardized Score Standardized Score Percentile/CP Descriptor   BDI-2 12 - - - Minimal   MUMTAZ 12 - - - Mild   ss = scaled score (mean = 10, SD = 3); SS = standard score (mean = 100, SD = 15); Tscore mean = 50, SD = 10; zscore (mean = 0.00, SD = 1)       Billing Documentation     Time spent conducting face to face interview with the patient: 78 minutes; billed separately.  Time on review of neuropsychological test data and relevant records, data interpretation, and writing/ documentation: 159 minutes; 71660 &27124 (x2).  Psychometrist time spent in the administration and scoring of 2 or more neuropsychological tests 300 minutes; 48383 & 90778 (x9).    Referral Diagnoses:   F31.9 (ICD-10-CM) - Bipolar 1 disorder, depressed   R41.89 (ICD-10-CM) - Cognitive decline   R41.3 (ICD-10-CM) - Memory loss     Signatures     Thank you for the opportunity to assist in the care of your patient. Please do not hesitate to contact me at [phone] or via Epic staff message if I can be of further assistance.        _____________________  Brock Shi, Ph.D.  Neuropsychologist  Department of Neuropsychology  [facility], [place]

## 2024-01-24 ENCOUNTER — OFFICE VISIT (OUTPATIENT)
Dept: NEUROLOGY | Facility: CLINIC | Age: 54
End: 2024-01-24
Payer: COMMERCIAL

## 2024-01-24 ENCOUNTER — DOCUMENTATION ONLY (OUTPATIENT)
Dept: NEUROLOGY | Facility: CLINIC | Age: 54
End: 2024-01-24

## 2024-01-24 DIAGNOSIS — F31.9 BIPOLAR 1 DISORDER, DEPRESSED: Primary | ICD-10-CM

## 2024-01-24 PROCEDURE — 96132 NRPSYC TST EVAL PHYS/QHP 1ST: CPT | Mod: S$GLB,,, | Performed by: STUDENT IN AN ORGANIZED HEALTH CARE EDUCATION/TRAINING PROGRAM

## 2024-01-24 PROCEDURE — 99499 UNLISTED E&M SERVICE: CPT | Mod: S$GLB,,, | Performed by: STUDENT IN AN ORGANIZED HEALTH CARE EDUCATION/TRAINING PROGRAM

## 2024-01-24 PROCEDURE — 99999 PR PBB SHADOW E&M-EST. PATIENT-LVL II: CPT | Mod: PBBFAC,,, | Performed by: STUDENT IN AN ORGANIZED HEALTH CARE EDUCATION/TRAINING PROGRAM

## 2024-01-24 NOTE — PROGRESS NOTES
Mr. Casanova  attended a in-person feedback session to discuss the results from his recent neuropsychological testing (see report dated 12/27/2023). We discussed his test results, diagnoses, and my recommendations. Mr. Casanova voiced his understanding of the information provided, and voiced his intention to follow through on the recommendations provided. All questions were answered to his satisfaction and Mr. Casanova was provided with a copy of his report. he was encouraged to contact our office with any future questions or concerns.         _____________________  Brock Shi, Ph.D.  Neuropsychologist  Department of Neuropsychology  [facility], [place]         Billing Documentation     Time on review of neuropsychological test data and relevant records, data interpretation, providing feedback about these results, and writing/ documentation: 60 minutes; [id number]

## 2024-01-24 NOTE — PROGRESS NOTES
After completing feedback with Mr. Casanova I noticed a clerical error in his data table that was communicated to him during feedback. I called Mr. Casanova to provide him the correct updated information. This datum does not change my impressions or recommendations from his neuropsychological assessment; however, I will revise his data table and characterization of scores to reflect the correct information.         _____________________  Brock Shi, Ph.D.  Neuropsychologist  Department of Neuropsychology  [facility] [place]

## 2024-03-14 ENCOUNTER — OFFICE VISIT (OUTPATIENT)
Dept: PSYCHIATRY | Facility: CLINIC | Age: 54
End: 2024-03-14
Payer: COMMERCIAL

## 2024-03-14 DIAGNOSIS — F17.220 NICOTINE DEPENDENCE, CHEWING TOBACCO, UNCOMPLICATED: ICD-10-CM

## 2024-03-14 DIAGNOSIS — F41.9 ANXIETY DISORDER, UNSPECIFIED TYPE: ICD-10-CM

## 2024-03-14 DIAGNOSIS — F31.9 BIPOLAR 1 DISORDER, DEPRESSED: Primary | ICD-10-CM

## 2024-03-14 DIAGNOSIS — F10.11 ALCOHOL ABUSE, IN REMISSION: ICD-10-CM

## 2024-03-14 DIAGNOSIS — F40.10 SOCIAL ANXIETY DISORDER: ICD-10-CM

## 2024-03-14 DIAGNOSIS — Z63.9 FAMILY DYNAMICS PROBLEM: ICD-10-CM

## 2024-03-14 DIAGNOSIS — G47.00 INSOMNIA, UNSPECIFIED TYPE: ICD-10-CM

## 2024-03-14 DIAGNOSIS — F90.2 ADHD (ATTENTION DEFICIT HYPERACTIVITY DISORDER), COMBINED TYPE: ICD-10-CM

## 2024-03-14 PROCEDURE — 90833 PSYTX W PT W E/M 30 MIN: CPT | Mod: 95,,, | Performed by: PSYCHIATRY & NEUROLOGY

## 2024-03-14 PROCEDURE — 99215 OFFICE O/P EST HI 40 MIN: CPT | Mod: 95,,, | Performed by: PSYCHIATRY & NEUROLOGY

## 2024-03-14 RX ORDER — LAMOTRIGINE 200 MG/1
200 TABLET ORAL DAILY
Qty: 30 TABLET | Refills: 3 | Status: SHIPPED | OUTPATIENT
Start: 2024-03-14 | End: 2024-06-05 | Stop reason: SDUPTHER

## 2024-03-14 RX ORDER — DEXTROAMPHETAMINE SACCHARATE, AMPHETAMINE ASPARTATE, DEXTROAMPHETAMINE SULFATE AND AMPHETAMINE SULFATE 2.5; 2.5; 2.5; 2.5 MG/1; MG/1; MG/1; MG/1
10 TABLET ORAL DAILY
Qty: 30 TABLET | Refills: 0 | Status: SHIPPED | OUTPATIENT
Start: 2024-05-22 | End: 2024-06-05 | Stop reason: SDUPTHER

## 2024-03-14 RX ORDER — ZOLPIDEM TARTRATE 5 MG/1
5 TABLET ORAL NIGHTLY PRN
Qty: 30 TABLET | Refills: 3 | Status: SHIPPED | OUTPATIENT
Start: 2024-03-22 | End: 2024-06-05

## 2024-03-14 RX ORDER — DEXTROAMPHETAMINE SACCHARATE, AMPHETAMINE ASPARTATE, DEXTROAMPHETAMINE SULFATE AND AMPHETAMINE SULFATE 2.5; 2.5; 2.5; 2.5 MG/1; MG/1; MG/1; MG/1
10 TABLET ORAL DAILY
Qty: 30 TABLET | Refills: 0 | Status: SHIPPED | OUTPATIENT
Start: 2024-03-22 | End: 2024-04-24

## 2024-03-14 RX ORDER — HYDROXYZINE HYDROCHLORIDE 50 MG/1
25-50 TABLET, FILM COATED ORAL 2 TIMES DAILY PRN
Qty: 60 TABLET | Refills: 1 | Status: SHIPPED | OUTPATIENT
Start: 2024-03-14 | End: 2024-05-13

## 2024-03-14 RX ORDER — DEXTROAMPHETAMINE SACCHARATE, AMPHETAMINE ASPARTATE, DEXTROAMPHETAMINE SULFATE AND AMPHETAMINE SULFATE 2.5; 2.5; 2.5; 2.5 MG/1; MG/1; MG/1; MG/1
10 TABLET ORAL DAILY
Qty: 30 TABLET | Refills: 0 | Status: SHIPPED | OUTPATIENT
Start: 2024-04-22 | End: 2024-05-24

## 2024-03-14 SDOH — SOCIAL DETERMINANTS OF HEALTH (SDOH): PROBLEM RELATED TO PRIMARY SUPPORT GROUP, UNSPECIFIED: Z63.9

## 2024-03-14 NOTE — PATIENT INSTRUCTIONS
PLAN:   Follow up  June 5 2024 @ 8:30 a Telehealth     Discussed his following up via me vs Telly pastrana prescriber; at present mutual ok to continue with me at present     Meds:  now all from [facility]     Adderall moved down to 10 mg daily  (in past (yrs ago) had been as high as 90mg daily with prior outside provider elisabeth SCHNEIDER)  Renewed  (Lamictal 200 mg #30 x3) / Stop IF any Rash and Infirm, Elisabeth SCHNEIDER    ambien  has been down to 5 mg proir 10 mg #30x 3     Continues attending AA as doing ; if need arises to return to counseling [phone] / ([facility]    References: (reviewed with pt as well):    Anxiety &  phobia workbook by JULIANO Espitia PhD  (web retailers: used: $ 7-10)    Relaxation stress reduction workbook: ALFA Lal PhD ( used: $7-10)    Feeling Good Website: Kyree Sweeney MD / www.[employer] website (free) PODCASTS    [place]: Path to Better Sleep : https://www.veterantraining.va.gov/insomnia/ (free)     Pt expressed appreciation for the visit today and did not have further question at this time though pt  was still informed to:     Call  if problems.    Call / Report Side Effects to Elisabeth SCHNEIDER     Encouraged to follow up with primary care / Gen Med MD for continued monitoring of general health and wellness.    Understanding was expressed; and no further concerns nor questions were raised at this time.     remember healthy self care:   eat right  attempt adequate rest   HANDWASHING / encourage such [place]. During this corona virus time   walk or light exercise within reason and as your general med team approves  read or explore any of reference materials / homework mentioned  reach out (I.e.,  connect with)  others who nuture and bring out best in you  avoid risky behaviors  keep your appointments  IF you  cannot make your appt THEN please call or go online to reschedule.  avoid  alcohol and illicit substances.  Look for the positive.  All is often relative-seek  balance  Call sooner if needed : [phone]   Call 911 or go to Emergency Room  (ER)  if any acute concerns

## 2024-03-14 NOTE — PROGRESS NOTES
Andrei Casanova   1970 03/14/2024     Disclaimer: Evaluation and treatment is based on information presented to date. Any new information may affect assessment and findings.    The patient location is: Patient's home   and reported  that his home in [place], [place]    Visit type: Virtual visit with synchronous audio and video     Each patient to whom he or she provides medical services by telemedicine is: (1) informed of the relationship between the physician and patient and the respective role of any other health care provider with respect to management of the patient; and (2) notified that he or she may decline to receive medical services by telemedicine and may withdraw from such care at any time.    Patient was informed that I am a physician who is licensed in the [facility]:  Kyree Bhatt MD:  Employed by   [facility]     Pt informed that if he / she is ever in crisis (or has acute concerns): pt is instructed to Dial 911 or go to nearest Emergency Room (ER)    Pt informed that if questions related to privacy practices: pt is instructed to contact [facility] Information Department: [phone]    Understanding Expressed. No questions.    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 ; history alcohol and substance use; reports speaks to sponsor daily ; reports substance and alcohol free    Who (in attendance) :  Pt himself     3-14-24 : 40 min     Note: asks for all meds to [facility]     S: Patient's Own Perception of Condition (& Side Effects) : completed neuropsyc testing Dr JORGE Shi and no dementia  / tho anxiety overlap with attentional inof / see FULL report Brock Shi PhD  12-27-23    O:     Says been doing rather   well     Marriage going well     Asks to move Adderall  from 15 mg daily down to Adderall 10 mg  ; in times past says he was up to 90 mg of Adderall with Dr. VENANCIO Segovia  in the past    He is mentioned his daughter previously I asked how she was  doing:  He informed that she has a drinking problem herself and took a semester off of school she was working on journalism at [employer]; she has in a sober living house.  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 he was very involved in the past at [place] but is moving some of his energy toward the [place] area since that is often where he spends the night; as his wife is from that area    He had 1st of 2 mtgs  with neuro psyc testing / in assoc with that had MRI done / report in EPIC as negative findings  / see full report in EPIC for full detail     Reports remains 'clean / sober' says  speaks to sponsor everyday ; got 3 yr chip esterday     Continues being sponsor for others in AA / says sponsors 4 people tho 2 are rather active; he had    Volunteers at [employer] (Andrei Briones counselor) in [place], [place] location; pradip says looking for opportunity in [facility].    Says continues to attend AA 2-3 time a weeks    Goal directed    Calm    No SI  no HI    No Psychosis    Mood: feeling better    Affect:  content smiling at times           3/14/2024    10:25 AM 12/20/2023     3:18 PM 10/25/2023     8:48 AM   GAD7   1. Feeling nervous, anxious, or on edge? 1 1 2   2. Not being able to stop or control worrying? 1 1 0   3. Worrying too much about different things? 2 1 0   4. Trouble relaxing? 1 0 1   5. Being so restless that it is hard to sit still? 0 0 0   6. Becoming easily annoyed or irritable? 0 2 0   7. Feeling afraid as if something awful might happen? 0 0 0   8. If you checked off any problems, how difficult have these problems made it for you to do your work, take care of things at home, or get along with other people? 1 0 1   MATTHEW-7 Score 5 5 3             3/14/2024    10:26 AM 12/20/2023     3:19 PM 10/25/2023     8:51 AM 9/14/2023     9:46 AM 6/19/2023     9:54 AM 1/23/2023     9:59 AM 8/29/2022     9:54 AM   Depression Patient Health Questionnaire   Over the last two  weeks how often have you been bothered by little interest or pleasure in doing things Not at all Not at all Not at all Not at all Not at all     Over the last two weeks how often have you been bothered by feeling down, depressed or hopeless Several days More than half the days Several days Several days Several days     PHQ-2 Total Score 1 2 1 1 1     Over the last two weeks how often have you been bothered by trouble falling or staying asleep, or sleeping too much More than half the days Not at all Not at all Not at all Not at all     Over the last two weeks how often have you been bothered by feeling tired or having little energy Not at all Not at all Several days Not at all Several days     Over the last two weeks how often have you been bothered by a poor appetite or overeating Not at all Not at all Not at all Not at all Not at all     Over the last two weeks how often have you been bothered by feeling bad about yourself - or that you are a failure or have let yourself or your family down Not at all Not at all Not at all Not at all Not at all     Over the last two weeks how often have you been bothered by trouble concentrating on things, such as reading the newspaper or watching television Not at all More than half the days More than half the days Several days Not at all     Over the last two weeks how often have you been bothered by moving or speaking so slowly that other people could have noticed. Or the opposite - being so fidgety or restless that you have been moving around a lot more than usual. Not at all Several days Not at all More than half the days Not at all     Over the last two weeks how often have you been bothered by thoughts that you would be better off dead, or of hurting yourself Not at all Not at all Not at all Not at all Not at all     If you checked off any problems, how difficult have these problems made it for you to do your work, take care of things at home or get along with other people?  Somewhat difficult Somewhat difficult Somewhat difficult Somewhat difficult Somewhat difficult     PHQ-9 Score 3 5 4 4 2     PHQ-9 Interpretation Minimal or None Mild Minimal or None Minimal or None Minimal or None         Information is confidential and restricted. Go to Review Flowsheets to unlock data.     In past: Saw Neurology  for  mild intermittent tremor lip /  (12-20-23) reported tremor resolved     In past / was referred in from Dr VENANCIO Segovia on Adderall XR 30 mg TWO daily (says priuor to that at one point had been up to 90mg daily) / over course treatment here  / was able move down to  adderall XR 30 mg ONCE daily then got down to adderall 15 mg daily (1 tab)/ now 3-14-24 has saked to mveo to 10 mg daily     3-14-24 Also asks about alternatives to ambien / has been at 5 mg  / discussed optiosn  / mutually agree to add availability Hydroxyzine 50 mg mild mod anxioety to see if relax for sleep / will report back on follow up     Social:    Also tho he has had some lifestyle adjustments as now  since June 2023 to [place] who lives [facility] and he stays [facility] 3 days week and with her 2 days week / says deneis that such is impacting his concentration     He had been giving report after med check report x MULTIPLE MONTHS / that was doing well on regimen.    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 Have noted to him: IF any situation of risk then he would need as for leave while further reviewed tho deneis that situation applies. 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 as well    Medication : no longer on  "neuroleptic ; off  For some time; he  Tried Abilify  10 lb (wt gain)    then D/C Saphris (muscle stiffness)  Zyprexa (sluggish and blah) / moved to lamictal and find lamictal helpful     mother passed  ;  dad remains living in [place]; getting along ok    Safety:no SI / no HI     Supports: enjoys his daughter ; she near graduation in  [employer] recently; (he is concerned about her seizure history)     Work / School     Activities tends like more solitarty activities / notes:  some social avoidant    Staying "Sober / "clean"  (I.e., Substance issue): says has sponsor / and participates in AA 2-3 x a week    Constitutional Health Concerns: forgetful > referral placing to neuropsyc ; tremor lip ; referring to neurology [facility]       Patient Active Problem List   Diagnosis    ADD (attention deficit disorder)    Bipolar affective    HTN (hypertension)    Alcohol abuse, in remission    Chews tobacco regularly    Mixed hyperlipidemia    Colon cancer screening    Special screening for malignant neoplasms, colon    Family history of colonic polyps    Bipolar 1 disorder, depressed    Social anxiety disorder    ADHD (attention deficit hyperactivity disorder), combined type    Cocaine abuse; full remission x years    Nicotine dependence, chewing tobacco, uncomplicated    Insomnia    Grief: Mom  Covid Dec 2020    Erythrocytosis    Family dynamic issues : dad's health, 16 yr old daughter living with ex wife / has seizures, other (see SW notes)    Prostatitis    Self reports Impaired concentration (mild)    Tremor/ intermittent lips          Current Outpatient Medications:     [START ON 3/22/2024] dextroamphetamine-amphetamine (ADDERALL) 10 mg Tab, Take 1 tablet (10 mg total) by mouth Daily., Disp: 30 tablet, Rfl: 0    [START ON 2024] dextroamphetamine-amphetamine (ADDERALL) 10 mg Tab, Take 1 tablet (10 mg total) by mouth Daily., Disp: 30 tablet, Rfl: 0    [START ON 2024] dextroamphetamine-amphetamine (ADDERALL) " 10 mg Tab, Take 1 tablet (10 mg total) by mouth Daily., Disp: 30 tablet, Rfl: 0    hydrOXYzine (ATARAX) 50 MG tablet, Take 0.5-1 tablets (25-50 mg total) by mouth 2 (two) times daily as needed (MILD or MODERATE ANXIETY)., Disp: 60 tablet, Rfl: 1    lamoTRIgine (LAMICTAL) 200 MG tablet, Take 1 tablet (200 mg total) by mouth once daily. STOP all Lamictal IF any RASH and tell Psyc MD, Disp: 30 tablet, Rfl: 3    [START ON 3/22/2024] zolpidem (AMBIEN) 5 MG Tab, Take 1 tablet (5 mg total) by mouth nightly as needed (INSOMNIA)., Disp: 30 tablet, Rfl: 3     Social History     Tobacco Use   Smoking Status Former    Current packs/day: 0.00    Average packs/day: 1 pack/day for 15.0 years (15.0 ttl pk-yrs)    Types: Cigarettes    Start date: 2/25/1994    Quit date: 2/25/2009    Years since quitting: 15.0   Smokeless Tobacco Never        Review of patient's allergies indicates:   Allergen Reactions    Abilify [aripiprazole] Other (See Comments)     Weight gain 10 lbs    Remeron [mirtazapine] Other (See Comments)     Increased hunger and felt disoriented    Saphris [asenapine] Other (See Comments)     Muscle stiffness    Zoloft [sertraline] Other (See Comments)     Sex dysfunction (anorgasmia)    Zyprexa [olanzapine] Other (See Comments)     sluggish and emotionally flat      Impulse Control: no history SI / nor HI    Mental Status Exam:   Appearance: casual   Oriented: x 3  Attitude: cooperative   Eye Contact: good   Behavior: calm   Mood: ok  Cognition: alert   Affect: appropriate range   Anxiety: mild  Thought Process: goal directed   Speech: Volume : WNL   Quantity WNL   Quality: appears to openly answer questions   Eye Contact: good   Threats: no SI / no HI   Psychosis: denies all   Estimate of Intellectual Function: above average     Psychotherapy:  Target symptoms:  anxiety sobriety job stress mood stability  Why chosen therapy is appropriate versus another modality: relevant to diagnosis  Outcome monitoring methods:  self-report, observation, checklist/rating scale  Therapeutic intervention type: insight oriented psychotherapy, supportive psychotherapy  Topics discussed/themes:  anxiety mngt job stress relationships paris wife daughter   The patient's response to the intervention is accepting. The patient's progress toward treatment goals is good.   Duration of intervention: 17 minutes.     ASSESSMENT:   Encounter Diagnoses   Name Primary?    Bipolar 1 disorder, depressed (Jan 2023: doing well on current med regimen (incl Lamictal 200 mg daily) Yes    Social anxiety disorder     Anxiety disorder, unspecified type     ADHD (attention deficit hyperactivity disorder), combined type     Insomnia, unspecified type     Family dynamic issues : dad's health, 16 yr old daughter (living with ex wife) has seizures, other (see SW notes)     Alcohol abuse, in FULL REMISSION sober 1 year 9-; in past 1/5 th a day      Nicotine dependence, chewing tobacco, uncomplicated          Patient Instructions     PLAN:   Follow up  June 5 2024 @ 8:30 a Telehealth     Discussed his following up via me vs Telly pastrana prescriber; at present mutual ok to continue with me at present     Meds:  now all from [facility]     Adderall moved down to 10 mg daily  (in past (yrs ago) had been as high as 90mg daily with prior outside provider victoriano SCHNEIDER)  Renewed  (Lamictal 200 mg #30 x3) / Stop IF any Rash and Infirm, Psmeche SCHNEIDER    ambien  has been down to 5 mg proir 10 mg #30x 3     Continues attending AA as doing ; if need arises to return to counseling [phone] / (note: previously saw Chris Alberto LCSW)    References: (reviewed with pt as well):    Anxiety &  phobia workbook by JULIANO Espitia PhD  (web retailers: used: $ 7-10)    Relaxation stress reduction workbook: ALFA Lal PhD ( used: $7-10)    Feeling Good Website: Kyree Sweeney MD / www.feeling[employer].com website (free) PODCASTS    [place]: Path to Better Sleep :  "https://www.veterantraining.va.gov/insomnia/ (free)     Pt expressed appreciation for the visit today and did not have further question at this time though pt  was still informed to:     Call  if problems.    Call / Report Side Effects to Psmeche SCHNEIDER     Encouraged to follow up with primary care / Gen Med MD for continued monitoring of general health and wellness.    Understanding was expressed; and no further concerns nor questions were raised at this time.     remember healthy self care:   eat right  attempt adequate rest   HANDWASHING / encourage such paris. During this corona virus time   walk or light exercise within reason and as your general med team approves  read or explore any of reference materials / homework mentioned  reach out (I.e.,  connect with)  others who nuture and bring out best in you  avoid risky behaviors  keep your appointments  IF you  cannot make your appt THEN please call or go online to reschedule.  avoid  alcohol and illicit substances.  Look for the positive.  All is often relative-seek balance  Call sooner if needed : [phone]   Call 911 or go to Emergency Room  (ER)  if any acute concerns    >>BACKGROUND from admit victoriano Romero Sept 2020 <<     Andrei collazo 53 y.o. Bipolar (depressed)  male presents today as referred by [facility] MD    Was seeing Carlos Barber MD for 8-10 yrs;he retired. 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 Dr Javed has continued on meds as from Dr Segovia    Pt has been maintained on:  on lamictal 200 mg (no rash)   adderall XR 30   ambien  5mg    Sober x 1 yr as of 9- via AA; prior to that 9 and 1/2 yr after relapsed    Says has sponsor    Says 'to be up front have had substance issues: alcohol and cocaine     Social anxiety ' big struggle for long time' ; more recently though time leave house; always struggle; people who not know think I outgoing; tho says push through it; makes feel present someone I am not    prior Juliette SCHNEIDER: tried paxil zoloft tho no clear recall of how did: interest to try /    Never abilify saphris    Says he did Tried effexor XR (75 mg) difficult sex side effect;  Denies any suicidal ideation / nor gesture / nor any thoughts to harm other     No psychosis    Self Rating Scales: (Such submitted for scanning) :     Quick Inventory of Depression (QID-Short Form):21  Zung Anxiety Index:78   Mood Disorder Questionnaire (MDQ): 8  The [employer] Framework: a "bio-psycho-social" screening form for use as clinical raw data. / (submitted for scanning)      World Health Organization (W.H.O.) Adult ADHD Self Report Scale (ASRS-henrique 1.1):  5 of 6 core and 6 of 12 adjunctive    PAST PSYCHIATRIC HISTORY:     Inpatient: n  Outpatient: (incl. 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 avoid cannabis    When revert would isolate ; did for release ; drug choice was cocaine    Alcohol: last 9-8 2020;   Start: 10y  regular use: 12y UNTIL decaon (1st period sober) when left start back drinking  At 12y: every weekend: beer 6 12 oa  16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 / 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; 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 (2-6 beer)    Blackouts: deneis   No seizure:    Relationships: when drink / isolate / fear of work consequence IF they learned     Was drinking 5th a day (3 yr ago; "drank hard = 5th a day"); until passed out; had get up 4 a or 4;30a ; in fear breathalizer; combat by [employer]     After deacon daily and cocaine    Went rehab: [facility] 1 month 2008 ; 2 months prior: in [place]: 21 days (out pocket private); none other    When went [place], self report drugs and alcohol; they said do not worry; when got back ; was sent psychiatrist tho said lied on questionairre as he said not drinknig so terminated    2 and 1/2 yrs later: he contact [facility] ; says was wrongfully terminated as self report; go job back    Says for 1st 2 y back weekly drug test and random; and employer said go see psmeche SCHNEIDER and go to counselor ; NOW just random.    Cannabis: 5th grade 1st  Try; 8th grade : at least 1x week; daily use: 16y until 18y then when came across    Tobacco:former ; stopped 2008 / start 21 yrs olf  Cocaine:years ago   Benzo: n  Opioid: n  Ecstasy:n  Other:n     Grandparent: Drug problems brother drug problem   Parents uncles / depression   Daughter depression    Mother and aunt history significant anxiety     3 " wishes? :  Serenity, comfortable in skin , (20y) daughter not have seizures or migranes     PSYCHO-SOCIAL DEVELOPMENT HISTORY:     City Born: [facility]    Siblings (full or half)  Brothers: 1 older 2 1/2   Sisters: none    Parents : alive     Briefly Describe  your Mom: poor health controlling   Briefly Describe your Dad: poor health unhappy anxious    Bio Mom: Occupation:    Bio Dad:  Occupation: car sales     Marital Status:  / x2 to same lady (15 y / 1995); 2 1/2 y Ne[place] ([place], [place]) ; speak frequently ; she RN school     Children   Girls  (ages): 1 daughter 20 y: seizures migraines vertigo / depression anxiety / [employer] / 2nd yr journalism  Boys (ages):     Physical Abuse: N    Sexual abuse  N    Other trauma / abuse? 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 n

## 2024-04-23 ENCOUNTER — PATIENT MESSAGE (OUTPATIENT)
Dept: PSYCHIATRY | Facility: CLINIC | Age: 54
End: 2024-04-23
Payer: COMMERCIAL

## 2024-04-26 DIAGNOSIS — G47.00 INSOMNIA, UNSPECIFIED TYPE: Primary | ICD-10-CM

## 2024-04-26 RX ORDER — TRAZODONE HYDROCHLORIDE 50 MG/1
50-100 TABLET ORAL NIGHTLY PRN
Qty: 60 TABLET | Refills: 2 | Status: SHIPPED | OUTPATIENT
Start: 2024-04-26 | End: 2024-06-05

## 2024-06-05 ENCOUNTER — OFFICE VISIT (OUTPATIENT)
Dept: PSYCHIATRY | Facility: CLINIC | Age: 54
End: 2024-06-05
Payer: COMMERCIAL

## 2024-06-05 DIAGNOSIS — F14.10 COCAINE ABUSE: ICD-10-CM

## 2024-06-05 DIAGNOSIS — G47.00 INSOMNIA, UNSPECIFIED TYPE: ICD-10-CM

## 2024-06-05 DIAGNOSIS — F31.9 BIPOLAR 1 DISORDER, DEPRESSED: Primary | ICD-10-CM

## 2024-06-05 DIAGNOSIS — F41.9 ANXIETY DISORDER, UNSPECIFIED TYPE: ICD-10-CM

## 2024-06-05 DIAGNOSIS — F40.10 SOCIAL ANXIETY DISORDER: ICD-10-CM

## 2024-06-05 DIAGNOSIS — F90.2 ADHD (ATTENTION DEFICIT HYPERACTIVITY DISORDER), COMBINED TYPE: ICD-10-CM

## 2024-06-05 DIAGNOSIS — F17.220 NICOTINE DEPENDENCE, CHEWING TOBACCO, UNCOMPLICATED: ICD-10-CM

## 2024-06-05 DIAGNOSIS — F10.11 ALCOHOL ABUSE, IN REMISSION: ICD-10-CM

## 2024-06-05 DIAGNOSIS — Z63.9 FAMILY DYNAMICS PROBLEM: ICD-10-CM

## 2024-06-05 PROCEDURE — 99214 OFFICE O/P EST MOD 30 MIN: CPT | Mod: 95,,, | Performed by: PSYCHIATRY & NEUROLOGY

## 2024-06-05 RX ORDER — DEXTROAMPHETAMINE SACCHARATE, AMPHETAMINE ASPARTATE, DEXTROAMPHETAMINE SULFATE AND AMPHETAMINE SULFATE 2.5; 2.5; 2.5; 2.5 MG/1; MG/1; MG/1; MG/1
10 TABLET ORAL DAILY
Qty: 30 TABLET | Refills: 0 | Status: SHIPPED | OUTPATIENT
Start: 2024-06-21 | End: 2024-07-21

## 2024-06-05 RX ORDER — DEXTROAMPHETAMINE SACCHARATE, AMPHETAMINE ASPARTATE, DEXTROAMPHETAMINE SULFATE AND AMPHETAMINE SULFATE 2.5; 2.5; 2.5; 2.5 MG/1; MG/1; MG/1; MG/1
10 TABLET ORAL DAILY
Qty: 30 TABLET | Refills: 0 | Status: SHIPPED | OUTPATIENT
Start: 2024-08-21 | End: 2024-09-20

## 2024-06-05 RX ORDER — TRAZODONE HYDROCHLORIDE 100 MG/1
100-200 TABLET ORAL NIGHTLY PRN
Qty: 180 TABLET | Refills: 1 | Status: SHIPPED | OUTPATIENT
Start: 2024-06-05 | End: 2024-12-02

## 2024-06-05 RX ORDER — DEXTROAMPHETAMINE SACCHARATE, AMPHETAMINE ASPARTATE, DEXTROAMPHETAMINE SULFATE AND AMPHETAMINE SULFATE 2.5; 2.5; 2.5; 2.5 MG/1; MG/1; MG/1; MG/1
10 TABLET ORAL DAILY
Qty: 30 TABLET | Refills: 0 | Status: SHIPPED | OUTPATIENT
Start: 2024-07-22 | End: 2024-08-21

## 2024-06-05 RX ORDER — LAMOTRIGINE 200 MG/1
200 TABLET ORAL DAILY
Qty: 30 TABLET | Refills: 3 | Status: SHIPPED | OUTPATIENT
Start: 2024-06-05 | End: 2024-10-03

## 2024-06-05 SDOH — SOCIAL DETERMINANTS OF HEALTH (SDOH): PROBLEM RELATED TO PRIMARY SUPPORT GROUP, UNSPECIFIED: Z63.9

## 2024-06-05 NOTE — PROGRESS NOTES
Andrei S Edilberto   1970 06/05/2024     Disclaimer: Evaluation and treatment is based on information presented to date. Any new information may affect assessment and findings.    The patient location is: Patient's home   and reported  that his home in [place], [place]    Visit type: Virtual visit with synchronous audio and video     Each patient to whom he or she provides medical services by telemedicine is: (1) informed of the relationship between the physician and patient and the respective role of any other health care provider with respect to management of the patient; and (2) notified that he or she may decline to receive medical services by telemedicine and may withdraw from such care at any time.    Patient was informed that I am a physician who is licensed in the [facility]:  Kyree Bhatt MD:  Employed by   [facility]     Pt informed that if he / she is ever in crisis (or has acute concerns): pt is instructed to Dial 911 or go to nearest Emergency Room (ER)    Pt informed that if questions related to privacy practices: pt is instructed to contact [facility] Information Department: [phone]    Understanding Expressed. No questions.    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 ; history alcohol and substance use; reports speaks to sponsor daily ; reports substance and alcohol free    Who (in attendance) :  Pt himself     Note: asks for all meds to [facility]     S: Patient's Own Perception of Condition (& Side Effects) : no med side effect    O:     Says been doing rather   well     Marriage largely going well / tho starting some counseling for communication    completed neuropsyc testing Dr JORGE Shi and no dementia  / tho anxiety overlap with inattention / see FULL report Brock Shi PhD  12-27-23    now down to Adderall  Immed release 10 mg  ; in times past says he was up to 90 mg of Adderall with Dr. VENANCIO Segovia  in the past    Prior Ambien dating back to at  least 2016 ; various 5 or 10mg ; pradip as of June 5 appt / says he is FULLY off  Ambien and liking Trazodone 100-200 mg at bed prn insomnia    Remains Lamictal 200 mg once daily no rash     Daughter has a drinking problem herself and took a semester off of school she was working on [employer] at [facility]; she has in a sober living house.  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    Says continues to attend AA 2-3 time a weeks    Goal directed    Calm    No SI  no HI    No Psychosis    Mood: feeling better    Affect:  content smiling at times     In past: Saw Neurology  for  mild intermittent tremor lip /  (12-20-23) reported tremor resolved     In past / was referred in from Dr VENANCIO Segovia on Adderall XR 30 mg TWO daily (says priuor to that at one point had been up to 90mg daily) / over course treatment here  / was able move down to  adderall XR 30 mg ONCE daily then got down to adderall 15 mg daily (1 tab)/ now 3-14-24 has saked to mveo to 10 mg daily       Social:    Also tho he has had some lifestyle adjustments as now  since June 2023 to [place] who lives [facility] and he stays [facility] 3 days week and with her 2 days week / says deneis that such is impacting his concentration     He had been giving report after med check report x MULTIPLE MONTHS / that was doing well on regimen.    3-4 months ago  he was even given promotion at work / he felt  stress level went down / pradip saying that there are times when he does not  recall what was  spoken  about at  "meeting    Continues working going   [employer] in [facility] area / deneis any safety issues / also notes works with team. Have noted to him: IF any situation of risk then he would need as for leave while further reviewed pradip weaver that situation applies. Understanding Expressed    Says looking at retiring around   to ; reports that he has received 3 raises in 12 months which favorably affects his USP as well    Medication : no longer on neuroleptic ; off  For some time; he  Tried Abilify  10 lb (wt gain)    then D/C Saphris (muscle stiffness)  Zyprexa (sluggish and blah) / moved to lamictal and find lamictal helpful     mother passed  ;  dad remains living in [place]; getting along ok    Safety:no SI / no HI     Supports: enjoys his daughter ; she near graduation in  [place] recently; (he is concerned about her seizure history)     Work / School     Activities tends like more solitarty activities / notes:  some social avoidant    Staying "Sober / "clean"  (I.e., Substance issue): says has sponsor / and participates in AA 2-3 x a week    Constitutional Health Concerns: forgetful > referral placing to neuropsyc ; tremor lip ; referring to neurology [facility]       Patient Active Problem List   Diagnosis    ADD (attention deficit disorder)    Bipolar affective    HTN (hypertension)    Alcohol abuse, in remission    Chews tobacco regularly    Mixed hyperlipidemia    Colon cancer screening    Special screening for malignant neoplasms, colon    Family history of colonic polyps    Bipolar 1 disorder, depressed    Social anxiety disorder    ADHD (attention deficit hyperactivity disorder), combined type    Cocaine abuse; full remission x years    Nicotine dependence, chewing tobacco, uncomplicated    Insomnia    Grief: Mom  Covid Dec 2020    Erythrocytosis    Family dynamic issues : dad's health, 16 yr old daughter living with ex wife / has seizures, other (see SW notes)    Prostatitis    " Self reports Impaired concentration (mild)    Tremor/ intermittent lips          Current Outpatient Medications:     [START ON 6/21/2024] dextroamphetamine-amphetamine (ADDERALL) 10 mg Tab, Take 1 tablet (10 mg total) by mouth Daily., Disp: 30 tablet, Rfl: 0    [START ON 7/22/2024] dextroamphetamine-amphetamine (ADDERALL) 10 mg Tab, Take 1 tablet (10 mg total) by mouth Daily., Disp: 30 tablet, Rfl: 0    [START ON 8/21/2024] dextroamphetamine-amphetamine (ADDERALL) 10 mg Tab, Take 1 tablet (10 mg total) by mouth Daily., Disp: 30 tablet, Rfl: 0    lamoTRIgine (LAMICTAL) 200 MG tablet, Take 1 tablet (200 mg total) by mouth once daily. STOP all Lamictal IF any RASH and tell Psyc MD, Disp: 30 tablet, Rfl: 3    traZODone (DESYREL) 100 MG tablet, Take 1-2 tablets (100-200 mg total) by mouth nightly as needed for Insomnia., Disp: 180 tablet, Rfl: 1     Social History     Tobacco Use   Smoking Status Former    Current packs/day: 0.00    Average packs/day: 1 pack/day for 15.0 years (15.0 ttl pk-yrs)    Types: Cigarettes    Start date: 2/25/1994    Quit date: 2/25/2009    Years since quitting: 15.2   Smokeless Tobacco Never        Review of patient's allergies indicates:   Allergen Reactions    Abilify [aripiprazole] Other (See Comments)     Weight gain 10 lbs    Remeron [mirtazapine] Other (See Comments)     Increased hunger and felt disoriented    Saphris [asenapine] Other (See Comments)     Muscle stiffness    Zoloft [sertraline] Other (See Comments)     Sex dysfunction (anorgasmia)    Zyprexa [olanzapine] Other (See Comments)     sluggish and emotionally flat      Impulse Control: no history SI / nor HI    Mental Status Exam:   Appearance: casual   Oriented: x 3  Attitude: cooperative   Eye Contact: good   Behavior: calm   Mood: ok  Cognition: alert   Affect: appropriate range   Anxiety: mild  Thought Process: goal directed   Speech: Volume : WNL   Quantity WNL   Quality: appears to openly answer questions   Eye Contact:  "good   Threats: no SI / no HI   Psychosis: denies all   Estimate of Intellectual Function: above average       ASSESSMENT:   Encounter Diagnoses   Name Primary?    Bipolar 1 disorder, depressed (Jan 2023: doing well on current med regimen (incl Lamictal 200 mg daily) Yes    ADHD (attention deficit hyperactivity disorder), combined type     Alcohol abuse, in FULL REMISSION sober 1 year 9-; in past 1/5 th a day      Social anxiety disorder     Anxiety disorder, unspecified type     Family dynamic issues : dad's health, 16 yr old daughter (living with ex wife) has seizures, other (see SW notes)     Insomnia, unspecified type     Nicotine dependence, chewing tobacco, uncomplicated     Cocaine abuse; full REMISSION x years        There are no Patient Instructions on file for this visit.    >>BACKGROUND from admit psmeche Eval Sept 2020 <<     Andrei collazo 54 y.o. Bipolar (depressed)  male presents today as referred by [facility]    Was seeing Carlos Barber MD for 8-10 yrs;he retired. 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 Dr Javed has continued on meds as from Dr Segovia    Pt has been maintained on:  on lamictal 200 mg (no rash)   adderall XR 30   D/C ambien  5mg / not taking  / now trazodone    Sober x 1 yr as of 9- via AA; prior to that 9 and 1/2 yr after relapsed    Says has sponsor    Says 'to be up front have had substance issues: alcohol and cocaine     Social anxiety ' big struggle for long time' ; more recently though time leave house; always struggle; people who not know think I outgoing; tho says push through it; makes feel present someone I am not    prior Juliette SCHNEIDER: tried paxil zoloft tho no clear recall of how did: interest to try /    Never abilify jack    Says he did Tried effexor XR (75 mg) difficult sex side effect;  Denies any suicidal ideation / nor gesture / nor any thoughts to harm other     No psychosis    Self Rating Scales: (Such submitted for scanning) :     Quick Inventory of Depression (QID-Short Form):21  Zung Anxiety Index:78   Mood Disorder Questionnaire (MDQ): 8  The [place] Framework: a "bio-psycho-social" screening form for use as clinical raw data. / (submitted for scanning)      World Health Organization (W.H.O.) Adult ADHD Self Report Scale (ASRS-henrique 1.1):  5 of 6 core and 6 of 12 adjunctive    PAST PSYCHIATRIC HISTORY:     Inpatient: n  Outpatient: (incl. Primary Care): juliette Barber MD    Past Surgical History:   Procedure Laterality Date    APPENDECTOMY      COLONOSCOPY N/A 2/25/2019    Procedure: COLONOSCOPY;  Surgeon: Denis Eller MD;  Location: [facility];  Service: Endoscopy;  Laterality: N/A;    KNEE SURGERY      right    SHOULDER SURGERY      bilateral    SINUS SURGERY      TONSILLECTOMY      VASECTOMY        Other (medical) :    Head Injury: n  Seizure: cocaine use / in 20s; late 30's (couple mre)  Diabetes :n  HTN Yes   Other:     Substance " "Use:    Once start [employer] avoid cannabis    When revert would isolate ; did for release ; drug choice was cocaine    Alcohol: last 9-8 2020;   Start: 10y  regular use: 12y UNTIL decaon (1st period sober) when left start back drinking  At 12y: every weekend: beer 6 12 oa  16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 / 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 1 month 2008 ; 2 months prior: in [place]: 21 days (out pocket private); none other    When went [place], self report drugs and alcohol; they said do not worry; when got back ; was sent psychiatrist tho said lied on questionairre as he said not drinknig so terminated    2 and 1/2 yrs later: he contact [facility] ; says was wrongfully terminated as self report; go job back    Says for 1st 2 y back weekly drug test and random; and employer said go see psyc MD and go to counselor ; NOW just random.    Cannabis: 5th grade 1st  Try; 8th grade : at least 1x week; daily use: 16y until 18y then when came across    Tobacco:former ; stopped 2008 / start 21 yrs olf  Cocaine:years ago   Benzo: n  Opioid: n  Ecstasy:n  Other:n     Grandparent: Drug problems brother drug problem   Parents uncles / depression   Daughter depression    Mother and aunt history significant anxiety     3 wishes? :  Serenity, comfortable in skin , (20y) daughter not have seizures or migranes     PSYCHO-SOCIAL DEVELOPMENT HISTORY:     City Born: [facility]    Siblings (full or half)  Brothers: 1 older 2 1/2   Sisters: none    Parents : alive     Briefly Describe  your Mom: poor health controlling "   Briefly Describe your Dad: poor health unhappy anxious    Bio Mom: Occupation:    Bio Dad:  Occupation: car sales     Marital Status:  / x2 to same lady (15 y / 1995); 2 1/2 y Neida ([place]) ; speak frequently ; she RN school     Children   Girls  (ages): 1 daughter 20 y: seizures migraines vertigo / depression anxiety / [employer] / 2nd yr journalism  Boys (ages):     Physical Abuse: N    Sexual abuse  N    Other trauma / abuse? 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    Legal: n  DWI: n  skilled nursing time? 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 n

## 2024-09-05 ENCOUNTER — OFFICE VISIT (OUTPATIENT)
Dept: PSYCHIATRY | Facility: CLINIC | Age: 54
End: 2024-09-05
Payer: COMMERCIAL

## 2024-09-05 DIAGNOSIS — G47.00 INSOMNIA, UNSPECIFIED TYPE: ICD-10-CM

## 2024-09-05 DIAGNOSIS — F14.10 COCAINE ABUSE: ICD-10-CM

## 2024-09-05 DIAGNOSIS — F10.11 ALCOHOL ABUSE, IN REMISSION: ICD-10-CM

## 2024-09-05 DIAGNOSIS — F31.9 BIPOLAR 1 DISORDER, DEPRESSED: Primary | ICD-10-CM

## 2024-09-05 DIAGNOSIS — F41.9 ANXIETY DISORDER, UNSPECIFIED TYPE: ICD-10-CM

## 2024-09-05 DIAGNOSIS — F17.220 NICOTINE DEPENDENCE, CHEWING TOBACCO, UNCOMPLICATED: ICD-10-CM

## 2024-09-05 DIAGNOSIS — Z63.9 FAMILY DYNAMICS PROBLEM: ICD-10-CM

## 2024-09-05 DIAGNOSIS — F40.10 SOCIAL ANXIETY DISORDER: ICD-10-CM

## 2024-09-05 RX ORDER — LAMOTRIGINE 200 MG/1
200 TABLET ORAL DAILY
Qty: 30 TABLET | Refills: 3 | Status: SHIPPED | OUTPATIENT
Start: 2024-09-05 | End: 2025-01-03

## 2024-09-05 RX ORDER — TRAZODONE HYDROCHLORIDE 100 MG/1
100-200 TABLET ORAL NIGHTLY PRN
Qty: 180 TABLET | Refills: 1 | Status: SHIPPED | OUTPATIENT
Start: 2024-09-05 | End: 2025-03-04

## 2024-09-05 SDOH — SOCIAL DETERMINANTS OF HEALTH (SDOH): PROBLEM RELATED TO PRIMARY SUPPORT GROUP, UNSPECIFIED: Z63.9

## 2024-09-05 NOTE — PROGRESS NOTES
Andrei S Edilberto   1970 09/05/2024     Disclaimer: Evaluation and treatment is based on information presented to date. Any new information may affect assessment and findings.    The patient location is: Patient's home   and reported  that his home in [place], [place]    Visit type: Virtual visit with synchronous audio and video     Each patient to whom he or she provides medical services by telemedicine is: (1) informed of the relationship between the physician and patient and the respective role of any other health care provider with respect to management of the patient; and (2) notified that he or she may decline to receive medical services by telemedicine and may withdraw from such care at any time.    Patient was informed that I am a physician who is licensed in the [facility]:  Kyree Bhatt MD:  Employed by   [facility]     Pt informed that if he / she is ever in crisis (or has acute concerns): pt is instructed to Dial 911 or go to nearest Emergency Room (ER)    Pt informed that if questions related to privacy practices: pt is instructed to contact [facility] Information Department: [phone]    Understanding Expressed. No questions.    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 ; history alcohol and substance use; reports speaks to sponsor daily ; reports substance and alcohol free    Who (in attendance) :  Pt himself     Note: asks for all meds to [facility]     S: Patient's Own Perception of Condition (& Side Effects) : no med side effect did see ENT for some vertigo and his crystals realign until table says it helped    O:     Doing ok THO notes divored from wife ; handling ok    completed neuropsyc testing Dr JORGE Shi and no dementia  / tho anxiety overlap with inattention / see FULL report Brock Sih PhD  12-27-23    Now Sept 2024:   FULLY off  Ambien and liking   FULLY off Adderall  Immed release 10 mg  ;in past says he was up to 90 mg of Adderall with  Dr. VENANCIO Segovia  in the past  Remains Lamictal 200 mg once daily no rash   Remains Trazodone 100-200 mg at bed prn insomnia    Daughter has a drinking problem herself and took a semester off of school she was working on [employer] at [facility]; she has in a sober living house.  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 since that is often where he spends the night; as his wife is from that area    Reports remains 'clean / sober' says  speaks to sponsor everyday AND he sponsors 2 people    he had  volunteered at [employer] (Andrei Briones counselor) in [place], [place] location; pradip says looking for opportunity in [facility].    Says continues to attend AA 2-3 time a weeks    Goal directed    Calm    No SI  no HI    No Psychosis    Mood: feeling better    Affect:  content smiling at times     In past: Saw Neurology  for  mild intermittent tremor lip /  (12-20-23) reported tremor resolved     Social:    Says  // Had gotten  June 2023 to [place] who lives [facility]   Early 2024  he was even given promotion at work / he felt  stress level went down / pradip saying that there are times when he does not  recall what was  spoken  about at meeting    Continues working going   "[employer] plant in [facility] / deneis any safety issues / also notes works with team. Have noted to him: IF any situation of risk then he would need as for leave while further reviewed pradip deneis that situation applies. Understanding Expressed    Says looking at retiring around  2025 to 2027; reports that he has received 3 raises in 12 months which favorably affects his half-way as well    Medication : no longer on neuroleptic ; off  For some time; he  Tried Abilify  10 lb (wt gain)    then D/C Saphris (muscle stiffness)  Zyprexa (sluggish and blah) / moved to lamictal and find lamictal  "helpful     mother passed  ;  dad remains living in [place]; getting along ok    Safety:no SI / no HI     Supports: enjoys his daughter ; she near graduation in  [employer] recently; (he is concerned about her seizure history)     Work / School     Activities tends like more solitarty activities / notes:  some social avoidant    Staying "Sober / "clean"  (I.e., Substance issue): says has sponsor / and participates in AA 2-3 x a week    Constitutional Health Concerns: no acute concerns ; neuropsyc cleared of memory issues; did the tild table realignment crystals / re vertigo helped      Patient Active Problem List   Diagnosis    ADD (attention deficit disorder)    Bipolar affective    HTN (hypertension)    Alcohol abuse, in remission    Chews tobacco regularly    Mixed hyperlipidemia    Colon cancer screening    Special screening for malignant neoplasms, colon    Family history of colonic polyps    Bipolar 1 disorder, depressed    Social anxiety disorder    ADHD (attention deficit hyperactivity disorder), combined type    Cocaine abuse; full remission x years    Nicotine dependence, chewing tobacco, uncomplicated    Insomnia    Grief: Mom  Covid Dec 2020    Erythrocytosis    Family dynamic issues : dad's health, 16 yr old daughter living with ex wife / has seizures, other (see SW notes)    Prostatitis    Self reports Impaired concentration (mild)    Tremor/ intermittent lips          Current Outpatient Medications:     lamoTRIgine (LAMICTAL) 200 MG tablet, Take 1 tablet (200 mg total) by mouth once daily. STOP all Lamictal IF any RASH and tell Psyc MD, Disp: 30 tablet, Rfl: 3    traZODone (DESYREL) 100 MG tablet, Take 1-2 tablets (100-200 mg total) by mouth nightly as needed for Insomnia., Disp: 180 tablet, Rfl: 1     Social History     Tobacco Use   Smoking Status Former    Current packs/day: 0.00    Average packs/day: 1 pack/day for 15.0 years (15.0 ttl pk-yrs)    Types: Cigarettes    Start date: " 2/25/1994    Quit date: 2/25/2009    Years since quitting: 15.5   Smokeless Tobacco Never        Review of patient's allergies indicates:   Allergen Reactions    Abilify [aripiprazole] Other (See Comments)     Weight gain 10 lbs    Remeron [mirtazapine] Other (See Comments)     Increased hunger and felt disoriented    Saphris [asenapine] Other (See Comments)     Muscle stiffness    Zoloft [sertraline] Other (See Comments)     Sex dysfunction (anorgasmia)    Zyprexa [olanzapine] Other (See Comments)     sluggish and emotionally flat      Impulse Control: no history SI / nor HI    Mental Status Exam:   Appearance: casual   Oriented: x 3  Attitude: cooperative   Eye Contact: good   Behavior: calm   Mood: doing ok tho notes  ; has supports  Cognition: alert   Affect: appropriate range   Anxiety: mild  Thought Process: goal directed   Speech: Volume : WNL   Quantity WNL   Quality: appears to openly answer questions   Eye Contact: good   Threats: no SI / no HI   Psychosis: denies all   Estimate of Intellectual Function: above average       ASSESSMENT:   Encounter Diagnoses   Name Primary?    Bipolar 1 disorder, depressed (Jan 2023: doing well on current med regimen (incl Lamictal 200 mg daily) Yes    Alcohol abuse, in FULL REMISSION sober 1 year 9-; in past 1/5 th a day      Social anxiety disorder     Anxiety disorder, unspecified type     Insomnia, unspecified type     Family dynamic issues : dad's health, teen daughter (living with ex wife) has seizures, other (see SW notes)     Nicotine dependence, chewing tobacco, uncomplicated     Cocaine abuse; full REMISSION x years        Patient Instructions     PLAN:   Follow Up Jan 16 2025 @ 9am TELEHEALTH    Discussed his following up via me vs Telly Shetty psyc prescriber; at present mutual ok to continue with me at present     Meds:  now all [facility]   Remains Lamictal 200 mg #30 x3) / Stop IF any Rash and Infirm, Psyc MD  Trazodone 100 mg at bed as  needed insomnia    All ambien  discontinued   All Adderall now discontinued    Continues attending AA as doing ; if need arises to return to counseling [phone] / (note: previously saw Chris Alberto LCSW)    References: (reviewed with pt as well):    Anxiety &  phobia workbook by JULIANO Espitia PhD  (web retailers: used: $ 7-10)    Relaxation stress reduction workbook: ALFA Lal PhD ( used: $7-10)    Feeling Good Website: Kyree Sweeney MD / www.[email or web address] website (free) PODCASTS    [place]: Path to Better Sleep : https://www.veterantraining.va.gov/insomnia/ (free)     Pt expressed appreciation for the visit today and did not have further question at this time though pt  was still informed to:     Call  if problems.    Call / Report Side Effects to Psyc MD     Encouraged to follow up with primary care / Gen Med MD for continued monitoring of general health and wellness.    Understanding was expressed; and no further concerns nor questions were raised at this time.     remember healthy self care:   eat right  attempt adequate rest   HANDWASHING / encourage such paris. During this corona virus time   walk or light exercise within reason and as your general med team approves  read or explore any of reference materials / homework mentioned  reach out (I.e.,  connect with)  others who nuture and bring out best in you  avoid risky behaviors  keep your appointments  IF you  cannot make your appt THEN please call or go online to reschedule.  avoid  alcohol and illicit substances.  Look for the positive.  All is often relative-seek balance  Call sooner if needed : [phone]   Call 911 or go to Emergency Room  (ER)  if any acute concerns    >>BACKGROUND from admit psmeche Eval Sept 2020 <<     Andrei collazo 54 y.o. Bipolar (depressed)  male presents today as referred by [facility]    Was seeing Carlos Barber MD for 8-10 yrs;he retired. 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 Dr Javed has continued on meds as from Dr Segovia    Pt has been maintained on:  on lamictal 200 mg (no rash)   adderall XR 30   D/C ambien  5mg / not taking  / now trazodone    Sober x 1 yr as of 9- via AA; prior to that 9 and 1/2 yr after relapsed    Says has sponsor    Says 'to be up front have had substance issues: alcohol and cocaine     Social anxiety ' big struggle for long time' ; more recently though time leave house; always struggle; people who not know think I outgoing; tho says push through it; makes feel present someone I am not    prior Juliette SCHNEIDER: tried paxil zoloft tho no clear recall of how did: interest to try /    Never abilify jack    Says he did Tried effexor XR (75 mg) difficult sex side effect;  Denies any suicidal ideation / nor gesture / nor any thoughts to harm other     No psychosis    Self Rating Scales: (Such submitted for scanning) :     Quick Inventory of Depression (QID-Short Form):21  Zung Anxiety " "Index:78   Mood Disorder Questionnaire (MDQ): 8  The [place] Framework: a "bio-psycho-social" screening form for use as clinical raw data. / (submitted for scanning)      World Health Organization (W.H.O.) Adult ADHD Self Report Scale (ASRS-henrique 1.1):  5 of 6 core and 6 of 12 adjunctive    PAST PSYCHIATRIC HISTORY:     Inpatient: n  Outpatient: (incl. 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 avoid cannabis    When revert would isolate ; did for release ; drug choice was cocaine    Alcohol: last 9-8 2020;   Start: 10y  regular use: 12y UNTIL decaon (1st period sober) when left start back drinking  At 12y: every weekend: beer 6 12 oa  16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 / 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; when  weekend tho at [facility] during week (2-6 beer)    Blackouts: deneis   No seizure:    Relationships: when drink / isolate / fear of work consequence IF they learned     Was drinking 5th a day (3 yr ago; "drank hard = 5th a day"); until passed out; had get up 4 a or 4;30a ; in fear breathalizer; combat by [employer]     After deacon daily and cocaine    Went rehab: [facility] 1 month 2008 ; 2 months prior: in [facility]: 21 days (out pocket private); none other    When went [place], self report drugs and alcohol; they said do not worry; when got back ; was sent psychiatrist tho said lied on questionairre as he said not drinknig so terminated    2 and 1/2 yrs later: he contact [facility] ; says was " wrongfully terminated as self report; go job back    Says for 1st 2 y back weekly drug test and random; and employer said go see psyc MD and go to counselor ; NOW just random.    Cannabis: 5th grade 1st  Try; 8th grade : at least 1x week; daily use: 16y until 18y then when came across    Tobacco:former ; stopped 2008 / start 21 yrs olf  Cocaine:years ago   Benzo: n  Opioid: n  Ecstasy:n  Other:n     Grandparent: Drug problems brother drug problem   Parents uncles / depression   Daughter depression    Mother and aunt history significant anxiety     3 wishes? :  Serenity, comfortable in skin , (20y) daughter not have seizures or migranes     PSYCHO-SOCIAL DEVELOPMENT HISTORY:     City Born: [facility]    Siblings (full or half)  Brothers: 1 older 2 1/2   Sisters: none    Parents : alive     Briefly Describe  your Mom: poor health controlling   Briefly Describe your Dad: poor health unhappy anxious    Bio Mom: Occupation:    Bio Dad:  Occupation: car sales     Marital Status:  / x2 to same lady (15 y / 1995); 2 1/2 y [place] ([place]) ; speak frequently ; she RN school     Children   Girls  (ages): 1 daughter 20 y: seizures migraines vertigo / depression anxiety / [employer] / 2nd yr journalism  Boys (ages):     Physical Abuse: N    Sexual abuse  N    Other trauma / abuse? 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 n

## 2024-09-05 NOTE — PATIENT INSTRUCTIONS
PLAN:   Follow Up Jan 16 2025 @ 9am TELEHEALTH    Discussed his following up via me vs Telly pastrana prescriber; at present mutual ok to continue with me at present     Meds:  now all from [facility]   Remains Lamictal 200 mg #30 x3) / Stop IF any Rash and Infirm, Psmeche SCHNEIDER  Trazodone 100 mg at bed as needed insomnia    All ambien  discontinued   All Adderall now discontinued    Continues attending AA as doing ; if need arises to return to counseling [phone] / (note: previously saw Chris ELLISW)    References: (reviewed with pt as well):    Anxiety &  phobia workbook by JULIANO Espitia PhD  (web retailers: used: $ 7-10)    Relaxation stress reduction workbook: ALFA Lal PhD ( used: $7-10)    Feeling Good Website: Kyree Sweeney MD / www.feelinggood.com website (free) PODCASTS    [place]: Path to Better Sleep : https://www.veterantraining.[place].gov/insomnia/ (free)     Pt expressed appreciation for the visit today and did not have further question at this time though pt  was still informed to:     Call  if problems.    Call / Report Side Effects to Psmeche SCHNEIDER     Encouraged to follow up with primary care / Gen Med MD for continued monitoring of general health and wellness.    Understanding was expressed; and no further concerns nor questions were raised at this time.     remember healthy self care:   eat right  attempt adequate rest   HANDWASHING / encourage such paris. During this corona virus time   walk or light exercise within reason and as your general med team approves  read or explore any of reference materials / homework mentioned  reach out (I.e.,  connect with)  others who nuture and bring out best in you  avoid risky behaviors  keep your appointments  IF you  cannot make your appt THEN please call or go online to reschedule.  avoid  alcohol and illicit substances.  Look for the positive.  All is often relative-seek balance  Call sooner if needed : [phone]   Call 911 or go to Emergency Room  (ER)   if any acute concerns

## 2025-01-16 ENCOUNTER — OFFICE VISIT (OUTPATIENT)
Dept: PSYCHIATRY | Facility: CLINIC | Age: 55
End: 2025-01-16
Payer: COMMERCIAL

## 2025-01-16 DIAGNOSIS — Z63.5 DIVORCE: ICD-10-CM

## 2025-01-16 DIAGNOSIS — F31.9 BIPOLAR 1 DISORDER, DEPRESSED: Primary | ICD-10-CM

## 2025-01-16 DIAGNOSIS — Z63.9 FAMILY DYNAMICS PROBLEM: ICD-10-CM

## 2025-01-16 DIAGNOSIS — F41.9 ANXIETY DISORDER, UNSPECIFIED TYPE: ICD-10-CM

## 2025-01-16 DIAGNOSIS — G47.00 INSOMNIA, UNSPECIFIED TYPE: ICD-10-CM

## 2025-01-16 DIAGNOSIS — F40.10 SOCIAL ANXIETY DISORDER: ICD-10-CM

## 2025-01-16 DIAGNOSIS — F90.2 ADHD (ATTENTION DEFICIT HYPERACTIVITY DISORDER), COMBINED TYPE: ICD-10-CM

## 2025-01-16 DIAGNOSIS — F17.220 NICOTINE DEPENDENCE, CHEWING TOBACCO, UNCOMPLICATED: ICD-10-CM

## 2025-01-16 DIAGNOSIS — F14.10 COCAINE ABUSE: ICD-10-CM

## 2025-01-16 DIAGNOSIS — F10.11 ALCOHOL ABUSE, IN REMISSION: ICD-10-CM

## 2025-01-16 RX ORDER — TRAZODONE HYDROCHLORIDE 100 MG/1
100 TABLET ORAL NIGHTLY PRN
Qty: 90 TABLET | Refills: 1 | Status: SHIPPED | OUTPATIENT
Start: 2025-01-16 | End: 2025-07-15

## 2025-01-16 RX ORDER — LAMOTRIGINE 200 MG/1
200 TABLET ORAL DAILY
Qty: 30 TABLET | Refills: 4 | Status: SHIPPED | OUTPATIENT
Start: 2025-01-16 | End: 2025-06-15

## 2025-01-16 SDOH — SOCIAL DETERMINANTS OF HEALTH (SDOH): PROBLEM RELATED TO PRIMARY SUPPORT GROUP, UNSPECIFIED: Z63.9

## 2025-01-16 SDOH — SOCIAL DETERMINANTS OF HEALTH (SDOH): DISRUPTION OF FAMILY BY SEPARATION AND DIVORCE: Z63.5

## 2025-01-16 NOTE — PROGRESS NOTES
Andrei Casanova   1970 01/18/2025     Disclaimer: Evaluation and treatment is based on information presented to date. Any new information may affect assessment and findings.    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 serves as sponsor to others      Who (in attendance) :  Pt himself     Note: asks for all meds to [facility]     S: Patient's Own Perception of Condition (& Side Effects) : no med side effect did see ENT for some vertigo and his crystals realign until table says it helped    O:     Doing ok THO notes divored from wife ; handling ok yet financial impact as no prenup and she tapping into his alf     completed neuropsyc testing Dr JORGE Shi and no dementia  / tho anxiety overlap with inattention / see FULL report Brock Shi PhD  12-27-23 Sept 2024:   FULLY off  Ambien   FULLY off Adderall  Immed release 10 mg  ;in past says he was up to 90 mg of Adderall with Dr. VENANCIO Segovia  in the past    Remains on Psyc Meds of:  Lamictal 200 mg once daily no rash   Remains Trazodone 100-200 mg at bed prn insomnia    Daughter has had a drinking problem herself ; she  took a semester off of school [employer] journalism; she has lived  in a sober living house.  Jan   reports daughter doing well and attending school [facility] [facility] [employer]    He continues serving as a sponsor to others he was very involved in the past at [place] but is moving some of his energy toward the [place] area since that is often where he spends the night; as his wife is from that area    Reports remains 'clean / sober' says  speaks to sponsor everyday AND he sponsors 2 people    Says continues to attend AA 2-3 time a week    Goal directed    Calm    No SI  no HI    No Psychosis    Mood: feeling better    Affect:  content smiling  "at times     In past: Saw Neurology  for  mild intermittent tremor lip / reported tremor resolved (12-20-23)     Social:    Says  // Had gotten  June 2023 to Shasta who lives [place]     Early 2024  he was even given promotion at work / he felt  stress level went down     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     Was  looking at retiring around  2025 to 2027; reports that he has received 3 raises in 12 months which favorably affects his MCC as well; yet his recent divorce impacted his financial savings as ex wife tapping into his MCC monies; so says (as of Jan 2025) he plans work bit longer than the expected: [id number] timeframe.     says he would never thought the ex-wife Shasta would have tapped into MCC ; noting she is a "Sabianism woman of kier[place]."    Medication : no longer on neuroleptic ; off  For some time; he  Tried Abilify  10 lb (wt gain)    then D/C Saphris (muscle stiffness)  Zyprexa (sluggish and blah) / moved to lamictal and find lamictal helpful     mother passed 2021 ;  dad remains living in [place]; getting along ok    Safety:no SI / no HI     Supports: enjoys his daughter ; she near graduation in  journalism recently; (he is concerned about her seizure history)     Work / School     Activities tends like more solitarty activities / notes:  some social avoidant    Staying "Sober / "clean"  (I.e., Substance issue): says has sponsor / and participates in AA 2-3 x a week    Constitutional Health Concerns: no acute concerns ; neuropsyc cleared of memory issues; did the tild table realignment crystals / re vertigo helped      Patient Active Problem List   Diagnosis    ADD (attention deficit disorder)    Bipolar affective    HTN (hypertension)    Alcohol abuse, in remission    Chews tobacco regularly    Mixed hyperlipidemia    Colon cancer screening    Special screening for " malignant neoplasms, colon    Family history of colonic polyps    Bipolar 1 disorder, depressed    Social anxiety disorder    ADHD (attention deficit hyperactivity disorder), combined type    Cocaine abuse; full remission x years    Nicotine dependence, chewing tobacco, uncomplicated    Insomnia    Grief: Mom  Covid Dec 2020    Erythrocytosis    Family dynamic issues : dad's health, 16 yr old daughter living with ex wife / has seizures, other (see SW notes)    Prostatitis    Self reports Impaired concentration (mild)    Tremor/ intermittent lips    Divorce: (Remarried 2023;  ; with notable vashti impact to him)          Current Outpatient Medications:     lamoTRIgine (LAMICTAL) 200 MG tablet, Take 1 tablet (200 mg total) by mouth once daily. STOP all Lamictal IF any RASH and tell Psyc MD, Disp: 30 tablet, Rfl: 4    traZODone (DESYREL) 100 MG tablet, Take 1 tablet (100 mg total) by mouth nightly as needed for Insomnia., Disp: 90 tablet, Rfl: 1     Social History     Tobacco Use   Smoking Status Former    Current packs/day: 0.00    Average packs/day: 1 pack/day for 15.0 years (15.0 ttl pk-yrs)    Types: Cigarettes    Start date: 1994    Quit date: 2009    Years since quitting: 15.9   Smokeless Tobacco Never        Review of patient's allergies indicates:   Allergen Reactions    Abilify [aripiprazole] Other (See Comments)     Weight gain 10 lbs    Remeron [mirtazapine] Other (See Comments)     Increased hunger and felt disoriented    Saphris [asenapine] Other (See Comments)     Muscle stiffness    Zoloft [sertraline] Other (See Comments)     Sex dysfunction (anorgasmia)    Zyprexa [olanzapine] Other (See Comments)     sluggish and emotionally flat      Impulse Control: no history SI / nor HI    Mental Status Exam:   Appearance: casual   Oriented: x 3  Attitude: cooperative   Eye Contact: good   Behavior: calm   Mood: doing ok tho notes  ; has supports  Cognition: alert   Affect:  appropriate range   Anxiety: mild  Thought Process: goal directed   Speech: Volume : WNL   Quantity WNL   Quality: appears to openly answer questions   Eye Contact: good   Threats: no SI / no HI   Psychosis: denies all   Estimate of Intellectual Function: above average     Psychotherapy:  Target symptoms: alcohol abuse, mood disorder, divorce and financial impact   Why chosen therapy is appropriate versus another modality: relevant to diagnosis  Outcome monitoring methods: self-report, observation  Therapeutic intervention type: insight oriented psychotherapy, supportive psychotherapy  Topics discussed/themes:  see Target Symptoms  The patient's response to the intervention is accepting. The patient's progress toward treatment goals is good.   Duration of intervention: 16 minutes.     ASSESSMENT:   Encounter Diagnoses   Name Primary?    Bipolar 1 disorder, depressed (Jan 2023: doing well on current med regimen (incl Lamictal 200 mg daily) Yes    Alcohol abuse, in FULL REMISSION sober 1 year 9-; in past 1/5 th a day      Divorce: (Remarried June 2023;  2024; with notable vashti impact to him)     Family dynamic issues : dad's health, teen daughter (living with ex wife) has seizures, other (see SW notes)     Social anxiety disorder     Anxiety disorder, unspecified type     Insomnia, unspecified type     ADHD (attention deficit hyperactivity disorder), combined type: tho no longer on stimulant; reports not needing     Cocaine abuse; full REMISSION x years     Nicotine dependence, chewing tobacco, uncomplicated          Patient Instructions   TELEHEALTH Follow Up April 30 2025 @ 8:30a TELEHEALTH       PLAN:   Follow Up     APRIL 30, 2025  8:30 AM CDT ESTABLISHED PATIENT - VIRTUAL [facility] - Psych [address]  Arrive at: Telehealth Post, Kyree WALTERS MD     Psyc Meds: Back at [facility]  Remains   Lamictal 200 mg #30 x3) / Stop IF any Rash and Infirm, Psyc MD  Trazodone 100 mg at bed as  needed insomnia    May 2024: he discontinued all Ambien and  Adderall now     Continues attending AA as doing ; states he will  previously saw Chris Alberto LCSW // which this psmeche SCHNEIDER encouraged; IF needed he can call [facility] psyc dept    References: (reviewed with pt as well):    Anxiety &  phobia workbook by JULIANO Espitia PhD  (web retailers: used: $ 7-10)    Relaxation stress reduction workbook: ALFA Lal PhD ( used: $7-10)    Feeling Good Website: Kyree Sweeney MD / www.[email or web address] website (free) PODCASTS    [place]: Path to Better Sleep : https://www.veterantraining.va.gov/insomnia/ (free)     Pt expressed appreciation for the visit today and did not have further question at this time though pt  was still informed to:     Call  if problems.    Call / Report Side Effects to Psmeche MD     Encouraged to follow up with primary care / Gen Med MD for continued monitoring of general health and wellness.    Understanding was expressed; and no further concerns nor questions were raised at this time.     remember healthy self care:   eat right  attempt adequate rest   HANDWASHING / encourage such paris. During this corona virus time   walk or light exercise within reason and as your general med team approves  read or explore any of reference materials / homework mentioned  reach out (I.e.,  connect with)  others who nuture and bring out best in you  avoid risky behaviors  keep your appointments  IF you  cannot make your appt THEN please call or go online to reschedule.  avoid  alcohol and illicit substances.  Look for the positive.  All is often relative-seek balance  Call sooner if needed : [phone]  Call 911 or go to Emergency Room  (ER)  if any acute concerns    >>BACKGROUND from admit psmeche Eval Sept 2020 <<     Andrei collazo 54 y.o. Bipolar (depressed)  male presents today as referred by [facility] Que[facility] MD    Was seeing Carlos Barber MD for 8-10 yrs;he retired. 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 Dr Javed has continued on meds as from Dr Segovia    Pt has been maintained on:  on lamictal 200 mg (no rash)   adderall XR 30   D/C ambien  5mg / not taking  / now trazodone    Sober x 1 yr as of 9- via AA; prior to that 9 and 1/2 yr after relapsed    Says has sponsor    Says 'to be up front have had substance issues: alcohol and cocaine     Social anxiety ' big struggle for long time' ; more recently though time leave house; always struggle; people who not know think I outgoing; tho says push through it; makes feel present someone I am not    prior Juliette SCHNEIDER: tried paxil zoloft tho no clear recall of how did: interest to try /    Never abilify jack    Says he did Tried effexor XR (75 mg) difficult sex side effect;  Denies any suicidal ideation / nor gesture / nor any thoughts to harm other     No psychosis    Self Rating Scales: (Such submitted for scanning) :     Quick Inventory of Depression (QID-Short " "Form):21  Zung Anxiety Index:78   Mood Disorder Questionnaire (MDQ): 8  The [place] Framework: a "bio-psycho-social" screening form for use as clinical raw data. / (submitted for scanning)      World Health Organization (W.H.O.) Adult ADHD Self Report Scale (ASRS-henrique 1.1):  5 of 6 core and 6 of 12 adjunctive    PAST PSYCHIATRIC HISTORY:     Inpatient: n  Outpatient: (incl. 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 cannabis    When revert would isolate ; did for release ; drug choice was cocaine    Alcohol: last 9-8 2020;   Start: 10y  regular use: 12y UNTIL decaon (1st period sober) when left start back drinking  At 12y: every weekend: beer 6 12 oa  16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 / 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; 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 (2-6 beer)    Blackouts: deneis   No seizure:    Relationships: when drink / isolate / fear of work consequence IF they learned     Was drinking 5th a day (3 yr ago; "drank hard = 5th a day"); until passed out; had get up 4 a or 4;30a ; in fear breathalizer; combat by get [employer]     After deacon daily and cocaine    Went rehab: [facility] 1 month 2008 ; 2 months prior: in [place]: 21 days (out pocket private); none other    When went [place], self report drugs and alcohol; they said do not worry; when got back ; was sent psychiatrist tho said lied on questionairre as he said not drinknig so terminated    2 and 1/2 yrs later: he " contact EEOC ; says was wrongfully terminated as self report; go job back    Says for 1st 2 y back weekly drug test and random; and employer said go see psmeche SCHNEIDER and go to counselor ; NOW just random.    Cannabis: 5th grade 1st  Try; 8th grade : at least 1x week; daily use: 16y until 18y then when came across    Tobacco:former ; stopped 2008 / start 21 yrs olf  Cocaine:years ago   Benzo: n  Opioid: n  Ecstasy:n  Other:n     Grandparent: Drug problems brother drug problem   Parents uncles / depression   Daughter depression    Mother and aunt history significant anxiety     3 wishes? :  Serenity, comfortable in skin , (20y) daughter not have seizures or migranes     PSYCHO-SOCIAL DEVELOPMENT HISTORY:     City Born: [facility]    Siblings (full or half)  Brothers: 1 older 2 1/2   Sisters: none    Parents : alive     Briefly Describe  your Mom: poor health controlling   Briefly Describe your Dad: poor health unhappy anxious    Bio Mom: Occupation:    Bio Dad:  Occupation: car sales     Marital Status:  / x2 to same lady (15 y / 1995); 2 1/2 y [place] ([place]) ; speak frequently ; she RN school     Children   Girls  (ages): 1 daughter 20 y: seizures migraines vertigo / depression anxiety / [employer] / 2nd yr journalism  Boys (ages):     Physical Abuse: N    Sexual abuse  N    Other trauma / abuse? 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 n

## 2025-01-16 NOTE — PATIENT INSTRUCTIONS
TELEHEALTH Follow Up April 30 2025 @ 8:30a TELEHEALTH       PLAN:   Follow Up     APRIL 30, 2025  8:30 AM CDT ESTABLISHED PATIENT - [facility]IRTUAL [facility] - [place] 4th [address]  Arrive at: Telehealth Post, Kyree WALTERS MD     Psyc Meds: Back at [facility]  Remains   Lamictal 200 mg #30 x3) / Stop IF any Rash and Infirm, Psyc MD  Trazodone 100 mg at bed as needed insomnia    May 2024: he discontinued all Ambien and  Adderall now     Continues attending AA as doing ; states he will  previously saw Chris Alberto LCSW // which this victoriano SCHNEIDER encouraged; IF needed he can call [facility] psyc dept    References: (reviewed with pt as well):    Anxiety &  phobia workbook by JULIANO Espitia PhD  (web retailers: used: $ 7-10)    Relaxation stress reduction workbook: ALFA Lal PhD ( used: $7-10)    Feeling Good Website: Kyree Sweeney MD / www.[email or web address] website (free) PODCASTS    [place]: Path to Better Sleep : https://www.veterantraining.[place].gov/insomnia/ (free)     Pt expressed appreciation for the visit today and did not have further question at this time though pt  was still informed to:     Call  if problems.    Call / Report Side Effects to Psyc MD     Encouraged to follow up with primary care / Gen Med MD for continued monitoring of general health and wellness.    Understanding was expressed; and no further concerns nor questions were raised at this time.     remember healthy self care:   eat right  attempt adequate rest   HANDWASHING / encourage such paris. During this corona virus time   walk or light exercise within reason and as your general med team approves  read or explore any of reference materials / homework mentioned  reach out (I.e.,  connect with)  others who nuture and bring out best in you  avoid risky behaviors  keep your appointments  IF you  cannot make your appt THEN please call or go online to reschedule.  avoid  alcohol and illicit substances.  Look for the positive.  All is often relative-seek  balance  Call sooner if needed : [phone]  Call 911 or go to Emergency Room  (ER)  if any acute concerns

## 2025-01-18 PROBLEM — Z63.5 DIVORCE: Status: ACTIVE | Noted: 2025-01-18

## 2025-02-14 ENCOUNTER — PATIENT MESSAGE (OUTPATIENT)
Dept: PSYCHIATRY | Facility: CLINIC | Age: 55
End: 2025-02-14
Payer: COMMERCIAL

## 2025-04-25 ENCOUNTER — PATIENT MESSAGE (OUTPATIENT)
Dept: PSYCHIATRY | Facility: CLINIC | Age: 55
End: 2025-04-25
Payer: COMMERCIAL

## 2025-05-07 ENCOUNTER — OFFICE VISIT (OUTPATIENT)
Dept: PSYCHIATRY | Facility: CLINIC | Age: 55
End: 2025-05-07
Payer: COMMERCIAL

## 2025-05-07 DIAGNOSIS — F10.11 ALCOHOL ABUSE, IN REMISSION: ICD-10-CM

## 2025-05-07 DIAGNOSIS — F41.9 ANXIETY DISORDER, UNSPECIFIED TYPE: ICD-10-CM

## 2025-05-07 DIAGNOSIS — G47.00 INSOMNIA, UNSPECIFIED TYPE: ICD-10-CM

## 2025-05-07 DIAGNOSIS — Z63.9 FAMILY DYNAMICS PROBLEM: ICD-10-CM

## 2025-05-07 DIAGNOSIS — F31.9 BIPOLAR 1 DISORDER, DEPRESSED: Primary | ICD-10-CM

## 2025-05-07 DIAGNOSIS — F40.10 SOCIAL ANXIETY DISORDER: ICD-10-CM

## 2025-05-07 DIAGNOSIS — Z63.5 DIVORCE: ICD-10-CM

## 2025-05-07 DIAGNOSIS — F14.10 COCAINE ABUSE: ICD-10-CM

## 2025-05-07 PROCEDURE — 98007 SYNCH AUDIO-VIDEO EST HI 40: CPT | Mod: 95,,, | Performed by: PSYCHIATRY & NEUROLOGY

## 2025-05-07 PROCEDURE — 90833 PSYTX W PT W E/M 30 MIN: CPT | Mod: 95,,, | Performed by: PSYCHIATRY & NEUROLOGY

## 2025-05-07 PROCEDURE — G2211 COMPLEX E/M VISIT ADD ON: HCPCS | Mod: 95,,, | Performed by: PSYCHIATRY & NEUROLOGY

## 2025-05-07 RX ORDER — TRAZODONE HYDROCHLORIDE 100 MG/1
100 TABLET ORAL NIGHTLY PRN
Qty: 90 TABLET | Refills: 1 | Status: SHIPPED | OUTPATIENT
Start: 2025-05-07 | End: 2025-11-03

## 2025-05-07 RX ORDER — LAMOTRIGINE 200 MG/1
200 TABLET ORAL DAILY
Qty: 30 TABLET | Refills: 4 | Status: SHIPPED | OUTPATIENT
Start: 2025-05-07 | End: 2025-10-04

## 2025-05-07 SDOH — SOCIAL DETERMINANTS OF HEALTH (SDOH): DISRUPTION OF FAMILY BY SEPARATION AND DIVORCE: Z63.5

## 2025-05-07 SDOH — SOCIAL DETERMINANTS OF HEALTH (SDOH): PROBLEM RELATED TO PRIMARY SUPPORT GROUP, UNSPECIFIED: Z63.9

## 2025-05-07 NOTE — PATIENT INSTRUCTIONS
PLAN:   Follow Up Aug 7 2025 @ 8:30 a Telehealth     Psyc Meds: Back at [facility]  Lamictal 200 mg #30 x4) / Stop IF any Rash and Inform, Psmeche SCHNEIDER  Trazodone 100 mg at bed as needed insomnia #90x1    May 2024: he discontinued all Ambien and  Adderall now     Continues attending AA as doing ; states he will  previously saw Chris Alberto LCSW // which this victoriano SCHNEIDER encouraged; IF needed he can call [facility] psyc dept    References: (reviewed with pt as well):    Anxiety &  phobia workbook by JULIANO Espitia PhD  (web retailers: used: $ 7-10)    Relaxation stress reduction workbook: ALFA Lal PhD ( used: $7-10)    Feeling Good Website: Kyree Sweeney MD / [email or web address] website (free) PODCASTS    [place]: Path to Better Sleep : https://www.veterantraining.va.gov/insomnia/ (free)     Pt expressed appreciation for the visit today and did not have further question at this time though pt  was still informed to:     Call  if problems.    Call / Report Side Effects to Psyc MD     Encouraged to follow up with primary care / Gen Med MD for continued monitoring of general health and wellness.    Understanding was expressed; and no further concerns nor questions were raised at this time.     remember healthy self care:   eat right  attempt adequate rest   HANDWASHING / encourage such paris. During this corona virus time   walk or light exercise within reason and as your general med team approves  read or explore any of reference materials / homework mentioned  reach out (I.e.,  connect with)  others who nuture and bring out best in you  avoid risky behaviors  keep your appointments  IF you  cannot make your appt THEN please call or go online to reschedule.  avoid  alcohol and illicit substances.  Look for the positive.  All is often relative-seek balance  Call sooner if needed : [phone]  Call 911 or go to Emergency Room  (ER)  if any acute concerns

## 2025-05-07 NOTE — PROGRESS NOTES
Andrei Severinoett   1970 05/07/2025     Disclaimer: Evaluation and treatment is based on information presented to date. Any new information may affect assessment and findings.    The patient location is: Patient's home in [facility]  and reported  that his/her location at the time of this visit was in the [facility]     Visit type: Virtual visit with synchronous audio and video     Each patient to whom he or she provides medical services by telemedicine is: (1) informed of the relationship between the physician and patient and the respective role of any other health care provider with respect to management of the patient; and (2) notified that he or she may decline to receive medical services by telemedicine and may withdraw from such care at any time.    Patient was informed that I am a physician who is licensed in the [facility]:  Kyree Bhatt MD:  Employed by   [employer]     If technology issues arise: YUAN Bhatt MD will attempt to call pt back     Pt informed that if he / she is ever in crisis (or has acute concerns): pt is instructed to Dial 911 or go to nearest Emergency Room (ER)    Pt informed that if questions related to privacy practices: pt is instructed to contact [facility] Information Department:     Understanding Expressed. No questions.     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 serves as sponsor to others      Who (in attendance) :  Pt himself     [facility]: [facility]     S: Patient's Own Perception of Condition (& Side Effects) : no med side effect /    O:     Doing ok THO notes divored from wife ; handling ok yet financial impact as no prenup and she tapping into his residential      5-7-25 reports has been feeling more lonely after the divorce; says he will get through it as he was in that situation before  this recent marriage; says working now with a counselor in the [place] area where he can also returned to Chris Alberto LCSW at [facility]    He continues serving as a sponsor to others he was very involved in the past at [place] but is moving some of his energy toward the [facility] since that is often where he spends the night; as his wife is from that area    Reports remains 'clean / sober' says  speaks to sponsor everyday AND he sponsors 2 people    Says continues to attend AA 2-3 time a week    Daughter who lives in [facility] (near [place], [place])  has had a drinking problem herself ;went school [employer]; she has lived  in a sober living hous; doing well and attending school [employer] [facility] [employer]    completed neuropsyc testing Dr JORGE Shi and no dementia  / tho anxiety overlap with inattention / see FULL report Brock Shi PhD  12-27-23 Sept 2024:   FULLY off  Ambien   FULLY off Adderall  Immed release 10 mg  ;in past says he was up to 90 mg of Adderall with Dr. VENANCIO Segovia  in the past    Remains on Psyc Meds of:  Lamictal 200 mg once daily no rash   Remains Trazodone 100-200 mg at bed prn insomnia    Goal directed    Calm    No SI  no HI    No Psychosis    Mood: feeling better    Affect:  content smiling at times     In past: Saw Neurology  for  mild intermittent tremor lip / reported tremor resolved (12-20-23)     Social:    Says  // Had gotten  June 2023 to Shasta who lives [facility]     Early 2024  he was even given promotion at work / he felt  stress level went down     Continues working [employer] plant in [facility] / deneis any safety issues / also notes works on a  team.     Was  looking at retiring around  2025 to 2027; reports that he has received 3 raises in 12 months which favorably affects his long term as well; yet his recent divorce impacted his financial savings as ex wife tapping into his long term monies; so says (as of Jan 2025) he plans work bit  "longer than the expected: [id number] timeframe.     says he would never thought the ex-wife Shasta would have tapped into [facility] ; noting she is a "Mandaen woman of kieran."    Medication : no longer on neuroleptic ; off  For some time; he  Tried Abilify  10 lb (wt gain)    then D/C Saphris (muscle stiffness)  Zyprexa (sluggish and blah) / moved to lamictal and find lamictal helpful     mother passed  ;  dad remains living in [place]; getting along ok    Safety:no SI / no HI     Supports: enjoys his daughter ; she near graduation in  [place] recently; (he is concerned about her seizure history)     Work / School     Activities tends like more solitarty activities / notes:  some social avoidant    Staying "Sober / "clean"  (I.e., Substance issue): says has sponsor / and participates in AA 2-3 x a week    Constitutional Health Concerns: no acute concerns ; neuropsyc cleared of memory issues; did the tild table realignment crystals / re vertigo helped      Patient Active Problem List   Diagnosis    ADD (attention deficit disorder)    Bipolar affective    HTN (hypertension)    Alcohol abuse, in remission    Chews tobacco regularly    Mixed hyperlipidemia    Colon cancer screening    Special screening for malignant neoplasms, colon    Family history of colonic polyps    Bipolar 1 disorder, depressed    Social anxiety disorder    ADHD (attention deficit hyperactivity disorder), combined type    Cocaine abuse; full remission x years    Nicotine dependence, chewing tobacco, uncomplicated    Insomnia    Grief: Mom  Covid Dec 2020    Erythrocytosis    Family dynamic issues : dad's health, 16 yr old daughter living with ex wife / has seizures, other (see SW notes)    Prostatitis    Self reports Impaired concentration (mild)    Tremor/ intermittent lips    Divorce: (Remarried 2023;  ; with notable vashti impact to him)          Current Outpatient Medications:     lamoTRIgine (LAMICTAL) 200 " MG tablet, Take 1 tablet (200 mg total) by mouth once daily. STOP all Lamictal IF any RASH and tell Psmeche SCHNEIDER, Disp: 30 tablet, Rfl: 4    traZODone (DESYREL) 100 MG tablet, Take 1 tablet (100 mg total) by mouth nightly as needed for Insomnia., Disp: 90 tablet, Rfl: 1     Social History     Tobacco Use   Smoking Status Former    Current packs/day: 0.00    Average packs/day: 1 pack/day for 15.0 years (15.0 ttl pk-yrs)    Types: Cigarettes    Start date: 1994    Quit date: 2009    Years since quittin.2   Smokeless Tobacco Never        Review of patient's allergies indicates:   Allergen Reactions    Abilify [aripiprazole] Other (See Comments)     Weight gain 10 lbs    Remeron [mirtazapine] Other (See Comments)     Increased hunger and felt disoriented    Saphris [asenapine] Other (See Comments)     Muscle stiffness    Zoloft [sertraline] Other (See Comments)     Sex dysfunction (anorgasmia)    Zyprexa [olanzapine] Other (See Comments)     sluggish and emotionally flat      Impulse Control: no history SI / nor HI    Mental Status Exam:   Appearance: casual   Oriented: x 3  Attitude: cooperative   Eye Contact: good   Behavior: calm   Mood: doing ok tho notes  ; has supports  Cognition: alert   Affect: appropriate range   Anxiety: mild  Thought Process: goal directed   Speech: Volume : WNL   Quantity WNL   Quality: appears to openly answer questions   Eye Contact: good   Threats: no SI / no HI   Psychosis: denies all   Estimate of Intellectual Function: above average     Psychotherapy:  Target symptoms: alcohol abuse, mood disorder, divorce and financial impact   Why chosen therapy is appropriate versus another modality: relevant to diagnosis  Outcome monitoring methods: self-report, observation  Therapeutic intervention type: insight oriented psychotherapy, supportive psychotherapy  Topics discussed/themes: see Target Symptoms  The patient's response to the intervention is accepting. The patient's  progress toward treatment goals is good.   Duration of intervention: 16 minutes.     ASSESSMENT:   Encounter Diagnoses   Name Primary?    Bipolar 1 disorder, depressed (Jan 2023: doing well on current med regimen (incl Lamictal 200 mg daily) Yes    Anxiety disorder, unspecified type     Social anxiety disorder     Insomnia, unspecified type     Divorce: (Remarried June 2023;  2024; with notable vashti impact to him)     Family dynamic issues : dad's health, teen daughter (living with ex wife) has seizures, other (see SW notes)     Alcohol abuse, in FULL REMISSION sober 1 year 9-; in past 1/5 th a day      Cocaine abuse; full REMISSION x years      Visit today included increased complexity associated with the assessment and treatment of Bioplar Disorder; history and continued efforts for sobriety; family dynamics; such involves meds and counselig       Patient Instructions     PLAN:   Follow Up Aug 7 2025 @ 8:30 a Telehealth     Psyc Meds: Back at [facility]  Lamictal 200 mg #30 x4) / Stop IF any Rash and Inform, Psyc MD  Trazodone 100 mg at bed as needed insomnia #90x1    May 2024: he discontinued all Ambien and  Adderall now     Continues attending AA as doing ; states he will  [facility] // which this psmeche SCHNEIDER encouraged; IF needed he can call [facility] psyc dept    References: (reviewed with pt as well):    Anxiety &  phobia workbook by JULIANO Espitia PhD  (web retailers: used: $ 7-10)    Relaxation stress reduction workbook: ALFA Lal PhD ( used: $7-10)    Feeling Good Website: Kyree Sweeney MD / [email or web address] website (free) PODCASTS    [place]: Path to Better Sleep : https://www.veterantraining.va.gov/insomnia/ (free)     Pt expressed appreciation for the visit today and did not have further question at this time though pt  was still informed to:     Call  if problems.    Call / Report Side Effects to Psmeche SCHNEIDER     Encouraged to follow up with primary care / Gen Med  "MD for continued monitoring of general health and wellness.    Understanding was expressed; and no further concerns nor questions were raised at this time.     remember healthy self care:   eat right  attempt adequate rest   HANDWASHING / encourage such paris. During this corona virus time   walk or light exercise within reason and as your general med team approves  read or explore any of reference materials / homework mentioned  reach out (I.e.,  connect with)  others who nuture and bring out best in you  avoid risky behaviors  keep your appointments  IF you  cannot make your appt THEN please call or go online to reschedule.  avoid  alcohol and illicit substances.  Look for the positive.  All is often relative-seek balance  Call sooner if needed : [phone]  Call 911 or go to Emergency Room  (ER)  if any acute concerns    >>BACKGROUND from admit victoriano Romero Sept 2020 <<     Andrei collazo 54 y.o. Bipolar (depressed)  male presents today as referred by [facility] MD    Was seeing Carlos Barber MD for 8-10 yrs;he retired. 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 Dr Javed has continued on meds as from Dr Segovia    Pt has been maintained on:  on lamictal 200 mg (no rash)   adderall XR 30   D/C ambien  5mg / not taking  / now trazodone    Sober x 1 yr as of 9- via AA; prior to that 9 and 1/2 yr after relapsed    Says has sponsor    Says 'to be up front have had substance issues: alcohol and cocaine     Social anxiety ' big struggle for long time' ; more recently though time leave house; always struggle; people who not know think I outgoing; tho says push through it; makes feel present someone I am not    prior Juliette SCHNEIDER: tried paxil zoloft tho no clear recall of how did: interest to try /    Never abilify jack    Says he did Tried effexor XR (75 mg) difficult sex side effect;  Denies any suicidal ideation / nor gesture / nor any thoughts to harm other     No psychosis    Self Rating Scales: (Such submitted for scanning) :     Quick Inventory of Depression (QID-Short Form):21  Zung Anxiety Index:78   Mood Disorder Questionnaire (MDQ): 8  The [place] Framework: a "bio-psycho-social" screening form for use as clinical raw data. / (submitted for scanning)      World Health Organization (W.H.O.) Adult ADHD Self Report Scale (ASRS-henrique 1.1):  5 of 6 core and 6 of 12 adjunctive    PAST PSYCHIATRIC HISTORY:     Inpatient: n  Outpatient: (incl. Primary Care): juliette Barber MD    Past Surgical History:   Procedure Laterality Date    APPENDECTOMY      COLONOSCOPY N/A 2/25/2019    Procedure: COLONOSCOPY;  Surgeon: Denis Eller MD;  Location[facility];  Service: Endoscopy;  Laterality: N/A;    KNEE SURGERY      right    SHOULDER SURGERY      bilateral    SINUS SURGERY      TONSILLECTOMY      VASECTOMY        Other (medical) :    Head Injury: n  Seizure: cocaine use / in 20s; late 30's (couple mre)  Diabetes " ":n  HTN Yes   Other:     Substance Use:    Once start [employer] avoid cannabis    When revert would isolate ; did for release ; drug choice was cocaine    Alcohol: last 9-8 2020;   Start: 10y  regular use: 12y UNTIL decaon (1st period sober) when left start back drinking  At 12y: every weekend: beer 6 12 oa  16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 / 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; 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 (2-6 beer)    Blackouts: deneis   No seizure:    Relationships: when drink / isolate / fear of work consequence IF they learned     Was drinking 5th a day (3 yr ago; "drank hard = 5th a day"); until passed out; had get up 4 a or 4;30a ; in fear breathalizer; combat by get [employer]     After deacon daily and cocaine    Went rehab: [facility] 1 month 2008 ; 2 months prior: in [facility]: 21 days (out pocket private); none other    When went [facility], self report drugs and alcohol; they said do not worry; when got back ; was sent psychiatrist tho said lied on questionairre as he said not drinknig so terminated    2 and 1/2 yrs later: he contact [facility] ; says was wrongfully terminated as self report; go job back    Says for 1st 2 y back weekly drug test and random; and employer said go see psyc MD and go to counselor ; NOW just random.    Cannabis: 5th grade 1st  Try; 8th grade : at least 1x week; daily use: 16y until 18y then when came across    Tobacco:former ; stopped 2008 / start 21 yrs olf  Cocaine:years ago   Benzo: n  Opioid: n  Ecstasy:n  Other:n     Grandparent: Drug problems brother drug problem   Parents uncles / depression   Daughter depression    Mother and aunt history significant anxiety     3 wishes? :  Serenity, comfortable in skin , (20y) daughter not have seizures or migranes     PSYCHO-SOCIAL DEVELOPMENT HISTORY:     City Born: [facility]    Siblings (full or half)  Brothers: 1 older 2 1/2   Sisters: none    Parents : alive     Briefly Describe  your " Mom: poor health controlling   Briefly Describe your Dad: poor health unhappy anxious    Bio Mom: Occupation:    Bio Dad:  Occupation: car sales     Marital Status:  / x2 to same lady (15 y / 1995); 2 1/2 y Neida ([place]izzette [place]) ; speak frequently ; she RN school     Children   Girls  (ages): 1 daughter 20 y: seizures migraines vertigo / depression anxiety / [employer] / 2nd yr journalism  Boys (ages):     Physical Abuse: N    Sexual abuse  N    Other trauma / abuse? 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 n

## 2025-08-07 ENCOUNTER — OFFICE VISIT (OUTPATIENT)
Dept: PSYCHIATRY | Facility: CLINIC | Age: 55
End: 2025-08-07
Payer: COMMERCIAL

## 2025-08-07 DIAGNOSIS — F40.10 SOCIAL ANXIETY DISORDER: ICD-10-CM

## 2025-08-07 DIAGNOSIS — F31.9 BIPOLAR 1 DISORDER, DEPRESSED: Primary | ICD-10-CM

## 2025-08-07 DIAGNOSIS — F41.9 ANXIETY DISORDER, UNSPECIFIED TYPE: ICD-10-CM

## 2025-08-07 DIAGNOSIS — Z63.5 DIVORCE: ICD-10-CM

## 2025-08-07 DIAGNOSIS — G47.00 INSOMNIA, UNSPECIFIED TYPE: ICD-10-CM

## 2025-08-07 DIAGNOSIS — F90.2 ADHD (ATTENTION DEFICIT HYPERACTIVITY DISORDER), COMBINED TYPE: ICD-10-CM

## 2025-08-07 RX ORDER — LAMOTRIGINE 200 MG/1
200 TABLET ORAL DAILY
Qty: 30 TABLET | Refills: 4 | Status: SHIPPED | OUTPATIENT
Start: 2025-08-07 | End: 2026-01-04

## 2025-08-07 RX ORDER — TRAZODONE HYDROCHLORIDE 100 MG/1
100 TABLET ORAL NIGHTLY PRN
Qty: 90 TABLET | Refills: 1 | Status: SHIPPED | OUTPATIENT
Start: 2025-08-07 | End: 2026-02-03

## 2025-08-07 RX ORDER — DEXTROAMPHETAMINE SACCHARATE, AMPHETAMINE ASPARTATE MONOHYDRATE, DEXTROAMPHETAMINE SULFATE AND AMPHETAMINE SULFATE 2.5; 2.5; 2.5; 2.5 MG/1; MG/1; MG/1; MG/1
10 CAPSULE, EXTENDED RELEASE ORAL EVERY MORNING
Qty: 30 CAPSULE | Refills: 0 | Status: SHIPPED | OUTPATIENT
Start: 2025-09-06 | End: 2025-10-06

## 2025-08-07 RX ORDER — DEXTROAMPHETAMINE SACCHARATE, AMPHETAMINE ASPARTATE MONOHYDRATE, DEXTROAMPHETAMINE SULFATE AND AMPHETAMINE SULFATE 2.5; 2.5; 2.5; 2.5 MG/1; MG/1; MG/1; MG/1
10 CAPSULE, EXTENDED RELEASE ORAL EVERY MORNING
Qty: 30 CAPSULE | Refills: 0 | Status: SHIPPED | OUTPATIENT
Start: 2025-08-07 | End: 2025-09-06

## 2025-08-07 SDOH — SOCIAL DETERMINANTS OF HEALTH (SDOH): DISRUPTION OF FAMILY BY SEPARATION AND DIVORCE: Z63.5

## 2025-08-07 NOTE — PATIENT INSTRUCTIONS
PLAN:   Follow Up Oct 15 2025 @ 9:30a TELEHEALTH     Psyc Meds: [facility]avn's [facility]  He asks to restore Adderall XR 10 mg  Lamictal 200 mg #30 x4) / Stop IF any Rash and Inform, Psmeche SCHNEIDER  Trazodone 100 mg at bed as needed insomnia #90x1    May 2024: he discontinued all Ambien and  Adderall (tho restarting Adderall(XR) Aug 7 2025); in past up to XR 30 mg     Continues attending AA as doing ; goes to eag group [facility]   states he will  previously saw [facility] // which this psmeche SCHNEIDER encouraged; IF needed he can call [facility] psyc dept    References: (reviewed with pt as well):    Anxiety &  phobia workbook by JULIANO Espitia PhD  (web retailers: used: $ 7-10)    Relaxation stress reduction workbook: ALFA Lal PhD ( used: $7-10)    Feeling Good Website: Kyree Sweeney MD / www.[employer] website (free) PODCASTS    [place]: Path to Better Sleep : https://www.veterantraining.va.gov/insomnia/ (free)     Pt expressed appreciation for the visit today and did not have further question at this time though pt  was still informed to:     Call  if problems.    Call / Report Side Effects to Psyc MD     Encouraged to follow up with primary care / Gen Med MD for continued monitoring of general health and wellness.    Understanding was expressed; and no further concerns nor questions were raised at this time.     remember healthy self care:   eat right  attempt adequate rest   HANDWASHING / encourage such paris. During this corona virus time   walk or light exercise within reason and as your general med team approves  read or explore any of reference materials / homework mentioned  reach out (I.e.,  connect with)  others who nuture and bring out best in you  avoid risky behaviors  keep your appointments  IF you  cannot make your appt THEN please call or go online to reschedule.  avoid  alcohol and illicit substances.  Look for the positive.  All is often relative-seek balance  Call sooner if  needed : [phone]  Call 911 or go to Emergency Room  (ER)  if any acute concerns

## 2025-08-07 NOTE — PROGRESS NOTES
Andrei Casanova   2025     Disclaimer: Evaluation and treatment is based on information presented to date. Any new information may affect assessment and findings.    The patient location is: Patient's home in [facility]  and reported  that his/her location at the time of this visit was in the [facility]     Visit type: Virtual visit with synchronous audio and video     Each patient to whom he or she provides medical services by telemedicine is: (1) informed of the relationship between the physician and patient and the respective role of any other health care provider with respect to management of the patient; and (2) notified that he or she may decline to receive medical services by telemedicine and may withdraw from such care at any time.    Patient was informed that I am a physician who is licensed in the [facility]:  Kyree Bhatt MD:  Employed by   [facility]     If technology issues arise: YUAN Bhatt MD will attempt to call pt back     Pt informed that if he / she is ever in crisis (or has acute concerns): pt is instructed to Dial 911 or go to nearest Emergency Room (ER)    Pt informed that if questions related to privacy practices: pt is instructed to contact [facility] Information Department:     Understanding Expressed. No questions.     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      S: Patient's Own Perception of Condition (& Side Effects) : 8-7-25 no med side effect /    O:      Mr Casanova asks to get back on Adderall XR 10 mg a day ; in past was up to XR 30 mg ; yet last was on 10 mg immed which he would take HALF tab BID ; no chest pain / no headache / did "absolutely find helpful to concentration  [place] position 2027 ; says about time he may retire then    Doing ok THO notes divored from wife ; handling ok yet financial impact as no prenup and she tapping into his shelter      5-7-25 reported some loneliness  after the divorce; working now with a counselor in the [place] area where he can also returned to [facility]    8-7-25: reports just got back on dating site     He continues serving as a sponsor to others he was very involved in the past at [place] but is moving some of his energy toward the [place] area since that is often where he spends the night; as his wife is from that area    Reports remains 'clean / sober' says  speaks to sponsor everyday AND he sponsors 2 people    Says continues to attend AA 2-3 time a week    Daughter who lives in [facility] (near [place], [place])  has had a drinking problem herself ;went school hospitals journalism; she has lived  in a sober living hous; doing well and attending school [employer]    completed neuropsyc testing Dr JORGE Shi and no dementia  / tho anxiety overlap with inattention / see FULL report Brock Shi PhD  12-27-23 Sept 2024:   FULLY off  Ambien   FULLY off Adderall  Immed release 10 mg  ;in past says he was up to 90 mg of Adderall with Dr. VENANCIO Segovia  in the past    8-7-25 asked to get back on some  Adderall; [place] pharmacy monitor checked last was on 10 mg daily half tab b.i.d.; mutually agree to Adderall XR 10 mg as what he requested    Remains on Psyc Meds of:  Lamictal 200 mg once daily no rash   Remains Trazodone 100-200 mg at bed " "prn insomnia  Adderall XR 10 mg resumed 8-7-25    Goal directed    Calm    No SI  no HI    No Psychosis    Mood: feeling better    Affect:  content smiling at times     In past: Saw Neurology  for  mild intermittent tremor lip / reported tremor resolved (12-20-23)     Social:    Says  // Had gotten  June 2023 to Shasta who lives [place]     Early 2024  he was even given promotion at work / he felt  stress level went down     Continues working [employer] in [facility] area / deneis any safety issues / also notes works on a  team.     Was  looking at retiring around  2025 to 2027; reports that he has received 3 raises in 12 months which favorably affects his care home as well; yet his recent divorce impacted his financial savings as ex wife tapping into his care home monies; so says (as of Jan 2025) he plans work bit longer than the expected: [id number] timeframe.     says he would never thought the ex-wife Shasta would have tapped into care home ; noting she is a "Jain woman of [place]."    Medication : no longer on neuroleptic ; off  For some time; he  Tried Abilify  10 lb (wt gain)    then D/C Saphris (muscle stiffness)  Zyprexa (sluggish and blah) / moved to lamictal and find lamictal helpful     mother passed 2021 ;  dad remains living in [place]; getting along ok    Safety:no SI / no HI     Supports: enjoys his daughter ; she near graduation in  journalism recently; (he is concerned about her seizure history)     Work / School     Activities tends like more solitarty activities / notes:  some social avoidant    Staying "Sober / "clean"  (I.e., Substance issue): says has sponsor / and participates in AA 2-3 x a week    Constitutional Health Concerns: no acute concerns ; neuropsyc cleared of memory issues; did the tild table realignment crystals / re vertigo helped    Patient Active Problem List   Diagnosis    ADD (attention deficit disorder)    Bipolar affective    HTN " (hypertension)    Alcohol abuse, in remission    Chews tobacco regularly    Mixed hyperlipidemia    Colon cancer screening    Special screening for malignant neoplasms, colon    Family history of colonic polyps    Bipolar 1 disorder, depressed    Social anxiety disorder    ADHD (attention deficit hyperactivity disorder), combined type    Cocaine abuse; full remission x years    Nicotine dependence, chewing tobacco, uncomplicated    Insomnia    Grief: Mom  Covid Dec 2020    Erythrocytosis    Family dynamic issues : dad's health, 16 yr old daughter living with ex wife / has seizures, other (see SW notes)    Prostatitis    Self reports Impaired concentration (mild)    Tremor/ intermittent lips    Divorce: (Remarried 2023;  ; with notable vashti impact to him)    Anxiety disorder        Current Outpatient Medications:     dextroamphetamine-amphetamine (ADDERALL XR) 10 MG 24 hr capsule, Take 1 capsule (10 mg total) by mouth every morning., Disp: 30 capsule, Rfl: 0    [START ON 2025] dextroamphetamine-amphetamine (ADDERALL XR) 10 MG 24 hr capsule, Take 1 capsule (10 mg total) by mouth every morning., Disp: 30 capsule, Rfl: 0    [START ON 2025] dextroamphetamine-amphetamine (ADDERALL XR) 10 MG 24 hr capsule, Take 1 capsule (10 mg total) by mouth every morning., Disp: 30 capsule, Rfl: 0    lamoTRIgine (LAMICTAL) 200 MG tablet, Take 1 tablet (200 mg total) by mouth once daily. STOP all Lamictal IF any RASH and tell Psyc MD, Disp: 30 tablet, Rfl: 4    traZODone (DESYREL) 100 MG tablet, Take 1 tablet (100 mg total) by mouth nightly as needed for Insomnia., Disp: 90 tablet, Rfl: 1     Social History     Tobacco Use   Smoking Status Former    Current packs/day: 0.00    Average packs/day: 1 pack/day for 15.0 years (15.0 ttl pk-yrs)    Types: Cigarettes    Start date: 1994    Quit date: 2009    Years since quittin.4   Smokeless Tobacco Never        Review of patient's allergies  indicates:   Allergen Reactions    Abilify [aripiprazole] Other (See Comments)     Weight gain 10 lbs    Remeron [mirtazapine] Other (See Comments)     Increased hunger and felt disoriented    Saphris [asenapine] Other (See Comments)     Muscle stiffness    Zoloft [sertraline] Other (See Comments)     Sex dysfunction (anorgasmia)    Zyprexa [olanzapine] Other (See Comments)     sluggish and emotionally flat      Impulse Control: no history SI / nor HI    Mental Status Exam:   Appearance: casual   Oriented: x 3  Attitude: cooperative   Eye Contact: good   Behavior: calm   Mood: doing ok tho notes  ; has supports  Cognition: alert   Affect: appropriate range   Anxiety: mild  Thought Process: goal directed   Speech: Volume : WNL   Quantity WNL   Quality: appears to openly answer questions   Eye Contact: good   Threats: no SI / no HI   Psychosis: denies all   Estimate of Intellectual Function: above average     Psychotherapy:  Target symptoms: alcohol abuse, mood disorder, divorce and financial impact   Why chosen therapy is appropriate versus another modality: relevant to diagnosis  Outcome monitoring methods: self-report, observation  Therapeutic intervention type: insight oriented psychotherapy, supportive psychotherapy  Topics discussed/themes: see Target Symptoms  The patient's response to the intervention is accepting. The patient's progress toward treatment goals is good.   Duration of intervention: 16 minutes.     ASSESSMENT:   Encounter Diagnoses   Name Primary?    Bipolar 1 disorder, depressed (Jan 2023: doing well on current med regimen (incl Lamictal 200 mg daily) Yes    ADHD (attention deficit hyperactivity disorder), combined type     Anxiety disorder, unspecified type     Social anxiety disorder     Divorce: (Remarried June 2023;  2024; with notable vashti impact to him)     Insomnia, unspecified type        Visit today included increased complexity associated with the assessment and  treatment of Bioplar Disorder; history and continued efforts for sobriety; family dynamics; such involves meds and counselig       Patient Instructions     PLAN:   Follow Up Oct 15 2025 @ 9:30a TELEHEALTH     Psyc Meds: Fausto Coffman [place]  He asks to restore Adderall XR 10 mg  Lamictal 200 mg #30 x4) / Stop IF any Rash and Inform, Psyc MD  Trazodone 100 mg at bed as needed insomnia #90x1    May 2024: he discontinued all Ambien and  Adderall (tho restarting Adderall(XR) Aug 7 2025); in past up to XR 30 mg     Continues attending AA as doing ; goes to eag [facility]   states he will  [facility] // which this psmeche SCHNEIDER encouraged; IF needed he can call [facility] psyc dept    References: (reviewed with pt as well):    Anxiety &  phobia workbook by JULIANO Espitia PhD  (web retailers: used: $ 7-10)    Relaxation stress reduction workbook: ALFA Lal PhD ( used: $7-10)    Feeling Good Website: Kyree Sweeney MD / www.feeling"[employer].com website (free) PODCASTS    [place]: Path to Better Sleep : https://www.veterantraining.va.gov/insomnia/ (free)     Pt expressed appreciation for the visit today and did not have further question at this time though pt  was still informed to:     Call  if problems.    Call / Report Side Effects to Psyc MD     Encouraged to follow up with primary care / Gen Med MD for continued monitoring of general health and wellness.    Understanding was expressed; and no further concerns nor questions were raised at this time.     remember healthy self care:   eat right  attempt adequate rest   HANDWASHING / encourage such paris. During this corona virus time   walk or light exercise within reason and as your general med team approves  read or explore any of reference materials / homework mentioned  reach out (I.e.,  connect with)  others who nuture and bring out best in you  avoid risky behaviors  keep your appointments  IF you  cannot make your appt THEN  "please call or go online to reschedule.  avoid  alcohol and illicit substances.  Look for the positive.  All is often relative-seek balance  Call sooner if needed : [phone]  Call 911 or go to Emergency Room  (ER)  if any acute concerns    >>BACKGROUND from admit victoriano Romero Sept 2020 <<     Andrei GARIMA collazo 55 y.o. Bipolar (depressed)  male presents today as referred by [facility] MD    Was seeing Carlos Barber MD for 8-10 yrs;he retired. 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 Dr Javed has continued on meds as from Dr Segovia    Pt has been maintained on:  on lamictal 200 mg (no rash)   adderall XR 30   D/C ambien  5mg / not taking  / now trazodone    Sober x 1 yr as of 9- via AA; prior to that 9 and 1/2 yr after relapsed    Says has sponsor    Says 'to be up front have had substance issues: alcohol and cocaine     Social anxiety ' big struggle for long time' ; more recently though time " "leave house; always struggle; people who not know think I outgoing; tho says push through it; makes feel present someone I am not    prior Juliette SCHNEIDER: tried paxil zoloft tho no clear recall of how did: interest to try /    Never abilify jack    Says he did Tried effexor XR (75 mg) difficult sex side effect;  Denies any suicidal ideation / nor gesture / nor any thoughts to harm other     No psychosis    Self Rating Scales: (Such submitted for scanning) :     Quick Inventory of Depression (QID-Short Form):21  Zung Anxiety Index:78   Mood Disorder Questionnaire (MDQ): 8  The [place] Framework: a "bio-psycho-social" screening form for use as clinical raw data. / (submitted for scanning)      World Health Organization (W.H.O.) Adult ADHD Self Report Scale (ASRS-henrique 1.1):  5 of 6 core and 6 of 12 adjunctive    PAST PSYCHIATRIC HISTORY:     Inpatient: n  Outpatient: (incl. 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 cannabis    When revert would isolate ; did for release ; drug choice was cocaine    Alcohol: last 9-8 2020;   Start: 10y  regular use: 12y UNTIL decaon (1st period sober) when left start back drinking  At 12y: every weekend: beer 6 12 oa  16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 / 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; 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 (2-6 beer)    Blackouts: deneis   No seizure:    Relationships: when drink / isolate / fear of work consequence IF they learned     Was drinking 5th a day (3 yr ago; " ""drank hard = 5th a day"); until passed out; had get up 4 a or 4;30a ; in fear breathalizer; combat by get [employer]     After deacon daily and cocaine    Went rehab: [facility] 1 month 2008 ; 2 months prior: in [place]: 21 days (out pocket private); none other    When went [place], self report drugs and alcohol; they said do not worry; when got back ; was sent psychiatrist pradip said lied on questionairre as he said not drinknig so terminated    2 and 1/2 yrs later: he contact [facility] ; says was wrongfully terminated as self report; go job back    Says for 1st 2 y back weekly drug test and random; and employer said go see psyc MD and go to counselor ; NOW just random.    Cannabis: 5th grade 1st  Try; 8th grade : at least 1x week; daily use: 16y until 18y then when came across    Tobacco:former ; stopped 2008 / start 21 yrs olf  Cocaine:years ago   Benzo: n  Opioid: n  Ecstasy:n  Other:n     Grandparent: Drug problems brother drug problem   Parents uncles / depression   Daughter depression    Mother and aunt history significant anxiety     3 wishes? :  Serenity, comfortable in skin , (20y) daughter not have seizures or migranes     PSYCHO-SOCIAL DEVELOPMENT HISTORY:     City Born: [facility]    Siblings (full or half)  Brothers: 1 older 2 1/2   Sisters: none    Parents : alive     Briefly Describe  your Mom: poor health controlling   Briefly Describe your Dad: poor health unhappy anxious    Bio Mom: Occupation:    Bio Dad:  Occupation: car sales     Marital Status:  / x2 to same lady (15 y / 1995); 2 1/2 y [place] ([place], [place]) ; speak frequently ; she RN school     Children   Girls  (ages): 1 daughter 20 y: seizures migraines vertigo / depression anxiety / [employer] / 2nd yr journalism  Boys (ages):     Physical Abuse: N    Sexual abuse  N    Other trauma / abuse? N    Education: 2 semester college ; [employer]    Bahai / Spiritual: Nondenominational raised / " spiritual  More than Sikhism ; deacon Islam / not active ; did serve in that capacity 5 yrs (2001-4); 1st Islam lizzette spr    Legal: n  DWI: n  senior care time? 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 n